# Patient Record
Sex: FEMALE | Race: WHITE | NOT HISPANIC OR LATINO | Employment: FULL TIME | URBAN - METROPOLITAN AREA
[De-identification: names, ages, dates, MRNs, and addresses within clinical notes are randomized per-mention and may not be internally consistent; named-entity substitution may affect disease eponyms.]

---

## 2021-12-03 ENCOUNTER — OFFICE VISIT (OUTPATIENT)
Dept: URGENT CARE | Facility: CLINIC | Age: 21
End: 2021-12-03
Payer: COMMERCIAL

## 2021-12-03 VITALS
BODY MASS INDEX: 17.03 KG/M2 | WEIGHT: 115 LBS | RESPIRATION RATE: 18 BRPM | HEIGHT: 69 IN | TEMPERATURE: 98 F | HEART RATE: 73 BPM | OXYGEN SATURATION: 99 % | DIASTOLIC BLOOD PRESSURE: 78 MMHG | SYSTOLIC BLOOD PRESSURE: 117 MMHG

## 2021-12-03 DIAGNOSIS — M25.562 LEFT KNEE PAIN, UNSPECIFIED CHRONICITY: Primary | ICD-10-CM

## 2021-12-03 PROCEDURE — 96372 THER/PROPH/DIAG INJ SC/IM: CPT | Mod: S$GLB,,, | Performed by: NURSE PRACTITIONER

## 2021-12-03 PROCEDURE — 99203 OFFICE O/P NEW LOW 30 MIN: CPT | Mod: 25,S$GLB,, | Performed by: NURSE PRACTITIONER

## 2021-12-03 PROCEDURE — 96372 PR INJECTION,THERAP/PROPH/DIAG2ST, IM OR SUBCUT: ICD-10-PCS | Mod: S$GLB,,, | Performed by: NURSE PRACTITIONER

## 2021-12-03 PROCEDURE — 99203 PR OFFICE/OUTPT VISIT, NEW, LEVL III, 30-44 MIN: ICD-10-PCS | Mod: 25,S$GLB,, | Performed by: NURSE PRACTITIONER

## 2021-12-03 RX ORDER — DEXAMETHASONE SODIUM PHOSPHATE 100 MG/10ML
10 INJECTION INTRAMUSCULAR; INTRAVENOUS ONCE
Status: COMPLETED | OUTPATIENT
Start: 2021-12-03 | End: 2021-12-03

## 2021-12-03 RX ORDER — TRAZODONE HYDROCHLORIDE 50 MG/1
50 TABLET ORAL NIGHTLY
COMMUNITY
End: 2022-08-11

## 2021-12-03 RX ORDER — DEXTROAMPHETAMINE SACCHARATE, AMPHETAMINE ASPARTATE MONOHYDRATE, DEXTROAMPHETAMINE SULFATE AND AMPHETAMINE SULFATE 5; 5; 5; 5 MG/1; MG/1; MG/1; MG/1
20 CAPSULE, EXTENDED RELEASE ORAL EVERY MORNING
COMMUNITY
End: 2022-07-12

## 2021-12-03 RX ADMIN — DEXAMETHASONE SODIUM PHOSPHATE 10 MG: 100 INJECTION INTRAMUSCULAR; INTRAVENOUS at 03:12

## 2022-05-17 ENCOUNTER — OFFICE VISIT (OUTPATIENT)
Dept: URGENT CARE | Facility: CLINIC | Age: 22
End: 2022-05-17
Payer: COMMERCIAL

## 2022-05-17 VITALS
HEIGHT: 69 IN | DIASTOLIC BLOOD PRESSURE: 88 MMHG | OXYGEN SATURATION: 98 % | WEIGHT: 115 LBS | HEART RATE: 95 BPM | TEMPERATURE: 99 F | SYSTOLIC BLOOD PRESSURE: 124 MMHG | BODY MASS INDEX: 17.03 KG/M2 | RESPIRATION RATE: 20 BRPM

## 2022-05-17 DIAGNOSIS — U07.1 COVID-19 VIRUS INFECTION: Primary | ICD-10-CM

## 2022-05-17 LAB
CTP QC/QA: YES
SARS-COV-2 RDRP RESP QL NAA+PROBE: POSITIVE

## 2022-05-17 PROCEDURE — U0002: ICD-10-PCS | Mod: QW,S$GLB,, | Performed by: STUDENT IN AN ORGANIZED HEALTH CARE EDUCATION/TRAINING PROGRAM

## 2022-05-17 PROCEDURE — 3008F BODY MASS INDEX DOCD: CPT | Mod: CPTII,S$GLB,, | Performed by: STUDENT IN AN ORGANIZED HEALTH CARE EDUCATION/TRAINING PROGRAM

## 2022-05-17 PROCEDURE — 99213 OFFICE O/P EST LOW 20 MIN: CPT | Mod: S$GLB,,, | Performed by: STUDENT IN AN ORGANIZED HEALTH CARE EDUCATION/TRAINING PROGRAM

## 2022-05-17 PROCEDURE — 3008F PR BODY MASS INDEX (BMI) DOCUMENTED: ICD-10-PCS | Mod: CPTII,S$GLB,, | Performed by: STUDENT IN AN ORGANIZED HEALTH CARE EDUCATION/TRAINING PROGRAM

## 2022-05-17 PROCEDURE — U0002 COVID-19 LAB TEST NON-CDC: HCPCS | Mod: QW,S$GLB,, | Performed by: STUDENT IN AN ORGANIZED HEALTH CARE EDUCATION/TRAINING PROGRAM

## 2022-05-17 PROCEDURE — 1159F MED LIST DOCD IN RCRD: CPT | Mod: CPTII,S$GLB,, | Performed by: STUDENT IN AN ORGANIZED HEALTH CARE EDUCATION/TRAINING PROGRAM

## 2022-05-17 PROCEDURE — 3079F PR MOST RECENT DIASTOLIC BLOOD PRESSURE 80-89 MM HG: ICD-10-PCS | Mod: CPTII,S$GLB,, | Performed by: STUDENT IN AN ORGANIZED HEALTH CARE EDUCATION/TRAINING PROGRAM

## 2022-05-17 PROCEDURE — 99213 PR OFFICE/OUTPT VISIT, EST, LEVL III, 20-29 MIN: ICD-10-PCS | Mod: S$GLB,,, | Performed by: STUDENT IN AN ORGANIZED HEALTH CARE EDUCATION/TRAINING PROGRAM

## 2022-05-17 PROCEDURE — 1160F RVW MEDS BY RX/DR IN RCRD: CPT | Mod: CPTII,S$GLB,, | Performed by: STUDENT IN AN ORGANIZED HEALTH CARE EDUCATION/TRAINING PROGRAM

## 2022-05-17 PROCEDURE — 3079F DIAST BP 80-89 MM HG: CPT | Mod: CPTII,S$GLB,, | Performed by: STUDENT IN AN ORGANIZED HEALTH CARE EDUCATION/TRAINING PROGRAM

## 2022-05-17 PROCEDURE — 3074F SYST BP LT 130 MM HG: CPT | Mod: CPTII,S$GLB,, | Performed by: STUDENT IN AN ORGANIZED HEALTH CARE EDUCATION/TRAINING PROGRAM

## 2022-05-17 PROCEDURE — 1159F PR MEDICATION LIST DOCUMENTED IN MEDICAL RECORD: ICD-10-PCS | Mod: CPTII,S$GLB,, | Performed by: STUDENT IN AN ORGANIZED HEALTH CARE EDUCATION/TRAINING PROGRAM

## 2022-05-17 PROCEDURE — 3074F PR MOST RECENT SYSTOLIC BLOOD PRESSURE < 130 MM HG: ICD-10-PCS | Mod: CPTII,S$GLB,, | Performed by: STUDENT IN AN ORGANIZED HEALTH CARE EDUCATION/TRAINING PROGRAM

## 2022-05-17 PROCEDURE — 1160F PR REVIEW ALL MEDS BY PRESCRIBER/CLIN PHARMACIST DOCUMENTED: ICD-10-PCS | Mod: CPTII,S$GLB,, | Performed by: STUDENT IN AN ORGANIZED HEALTH CARE EDUCATION/TRAINING PROGRAM

## 2022-05-17 RX ORDER — MELOXICAM 15 MG/1
15 TABLET ORAL DAILY
COMMUNITY
Start: 2021-12-07 | End: 2022-08-11

## 2022-05-17 NOTE — LETTER
[address] [facility]  [place] [address]  Phone: [phone]  Fax: [phone]          Return to Work/School    Patient: Sunshine Lund  YOB: 2000   Date: 05/17/2022     To Whom It May Concern:     Sunshine Lund was in contact with/seen in my office on 05/17/2022. COVID-19 is present in our communities across the [facility].      Sunshine Lund has met the criteria for COVID-19 testing and has a POSITIVE result. She can return to work once they are experiencing symptom improvement and afebrile  for 24 hours without the use of fever reducing medications AND at least five days from the start of symptoms (or from the first positive result if they have no symptoms).      If you have any questions or concerns, or if I can be of further assistance, please do not hesitate to contact me.     Sincerely,      Sheila Knutson MD

## 2022-05-17 NOTE — PROGRESS NOTES
"Subjective:       Patient ID: Sunshine Lund is a 21 y.o. female.    Vitals:  height is 5' 9" (1.753 m) and weight is 52.2 kg (115 lb). Her temperature is 98.8 °F (37.1 °C). Her blood pressure is 124/88 and her pulse is 95. Her respiration is 20 and oxygen saturation is 98%.     Chief Complaint: Nasal Congestion (Positive at-home test - Entered by patient)    This is a 21 y.o. female   who presents today with a chief complaint of cold symptoms that began two days ago. She's complaining of congestion, cough, vomiting and fatigue. She's been taking ibuprofen and mucinex to help relieve her symptoms.     URI   This is a new problem. The current episode started in the past 7 days. The problem has been gradually worsening. There has been no fever. Associated symptoms include congestion, coughing and vomiting. Pertinent negatives include no abdominal pain, chest pain, diarrhea, dysuria, ear pain, headaches, joint pain, joint swelling, nausea, neck pain, plugged ear sensation, rash, rhinorrhea, sinus pain, sneezing, sore throat, swollen glands or wheezing. Treatments tried: mucinex and ibuprofen. The treatment provided mild relief.       Constitution: Positive for fatigue.   HENT: Positive for congestion. Negative for ear pain, sinus pain and sore throat.    Neck: Negative for neck pain.   Cardiovascular: Negative for chest pain.   Respiratory: Positive for cough and sputum production. Negative for wheezing.    Gastrointestinal: Positive for vomiting. Negative for abdominal pain, nausea and diarrhea.   Genitourinary: Negative for dysuria.   Skin: Negative for rash.   Allergic/Immunologic: Negative for sneezing.   Neurological: Negative for headaches.       Objective:      Physical Exam   Constitutional: She is oriented to person, place, and time. She appears ill. No distress.   HENT:   Head: Normocephalic.   Pulmonary/Chest: No respiratory distress.   Neurological: She is alert and oriented to person, place, and time. " Coordination normal.   Psychiatric: Her behavior is normal. Mood normal.   Nursing note and vitals reviewed.        Assessment:       1. COVID-19 virus infection        Results for orders placed or performed in visit on 05/17/22   POCT COVID-19 Rapid Screening   Result Value Ref Range    POC Rapid COVID Positive (A) Negative     Acceptable Yes        Plan:         COVID-19 virus infection  -     POCT COVID-19 Rapid Screening    Exam denotes features of patient observation only.   Vitals stable. No evidence of respiratory distress noted, able to speak in complete sentences without pause.    Requesting COVID-19 testing post exposure and/or given symptoms.   Tested for COVID-19 today. Rapid test was Positive. Educated on COVID-19 and COVID-19 testing. Advised on COVID-19 precautions. COVID Risk Score: 0 .     Discussed supportive care and OTC meds for symptom relief. Advised on return/follow-up precautions. Advised on ER precautions. Answered all patient questions. Patient verbalized understanding and voiced agreement with current treatment plan.

## 2022-05-17 NOTE — PATIENT INSTRUCTIONS
Below are suggestions for symptomatic relief of your upper respiratory symptoms:              -Salt water gargles to soothe throat pain.              -Chloroseptic spray and Cepacol lozenges also help to numb throat pain.              -Warm herbal teas with honey/lemon/delia can help soothe sore throat and hoarseness              -Nasal saline spray reduces inflammation and dryness.              -Warm face compresses to help with facial sinus pain/pressure.              -Humidifiers and steam can help with nasal dryness and congestion              -Vicks vapor rub at night.              -Flonase OTC or Nasacort OTC for nasal congestion and post-nasal drip. Ok to use twice daily for the first week, then reduce to once daily after symptoms have begun to improve.              -Afrin is a nasal spray that can give immediate relief of nasal congestion but you cannot use this medication for more than 3 days              -Simple foods like chicken noodle soup.              -Mucinex for congestion or Mucinex DM for cough during the day time. Delsym helps with coughing at night. Mucinex-D if you have chest congestion or sputum (caution if history of high blood pressure or palpitations). You must increase your water intake when using expectorants (Mucinex).              -Zyrtec/Claritin/Allegra/Xyzal should help with allergies.  -If you DO NOT have Hypertension or any history of palpitations, it is ok to take over the counter Sudafed or Mucinex D or Allegra-D or Claritin-D or Zyrtec-D.  -If you do take one of the above, it is ok to combine that with plain over the counter Mucinex or Allegra or Claritin or Zyrtec. If, for example, you are taking Zyrtec -D, you can combine that with Mucinex, but not Mucinex-D.  If you are taking Mucinex-D, you can combine that with plain Allegra or Claritin or Zyrtec.   -If you DO have Hypertension or palpitations, it is safe to take Coricidin HBP for relief of sinus symptoms.    You have  tested positive for COVID-19 today.  Please note that patients who test positive for COVID-19 are required by the CDC to undergo isolation for 5 days after their symptoms first began.     This isolation starts from the day you first developed symptoms, not the day of your positive test. For example, if your symptoms began on a Monday but tested positive on the following Wednesday, your 10-day isolation begins from that Monday, not the Wednesday you tested positive.    However, if you are asymptomatic (a person who does not have any symptoms) and COVID-19 positive, your -day isolation begins on the day you tested positive, regardless of exposure date.    You must to wear a mask for an additional 5 days after resuming public activities once your 5 day quarantine ends.    Also, per the CDC guidelines, once your 5 days have passed, and you have not had fever greater than 100.4F in the last 24 hours without taking any fever reducers such as Tylenol (Acetaminophen) or Motrin (Ibuprofen) AND you have 24h of symptom improvement, you may return to your normal activities including social distancing, wearing masks, and frequent handwashing - YOU DO NOT NEED ANOTHER TEST IN ORDER TO END YOUR QUARANTINE.      CDC Testing and Quarantine Guidelines for patients with exposure to a known-positive COVID-19 person (within 6 feet for more than 15 minutes over a 24hour period):    You should quarantine post exposure to someone who has COVID 19 if you meet any of the following criteria:   - Symptomatic  - Vaccinated but have not yet received a booster if you are qualified to do so  - Vaccinated but it has not been more than 2 weeks since your last dose.  - Vaccinated but have only received one dose in a 2 dose series.     Test 5 days after exposure or at first sign of symptoms. If negative, continue to mask and monitor for symptoms for 10 days post exposure.     If positive, see above for positive COVID test qurantine guidelines.

## 2022-06-21 ENCOUNTER — OFFICE VISIT (OUTPATIENT)
Dept: INTERNAL MEDICINE | Facility: CLINIC | Age: 22
End: 2022-06-21
Payer: COMMERCIAL

## 2022-06-21 VITALS
HEIGHT: 69 IN | WEIGHT: 112.44 LBS | SYSTOLIC BLOOD PRESSURE: 110 MMHG | DIASTOLIC BLOOD PRESSURE: 64 MMHG | HEART RATE: 83 BPM | OXYGEN SATURATION: 98 % | BODY MASS INDEX: 16.65 KG/M2

## 2022-06-21 DIAGNOSIS — F31.81 BIPOLAR 2 DISORDER: ICD-10-CM

## 2022-06-21 PROCEDURE — 99202 OFFICE O/P NEW SF 15 MIN: CPT | Mod: S$GLB,,, | Performed by: INTERNAL MEDICINE

## 2022-06-21 PROCEDURE — 1159F PR MEDICATION LIST DOCUMENTED IN MEDICAL RECORD: ICD-10-PCS | Mod: CPTII,S$GLB,, | Performed by: INTERNAL MEDICINE

## 2022-06-21 PROCEDURE — 1160F PR REVIEW ALL MEDS BY PRESCRIBER/CLIN PHARMACIST DOCUMENTED: ICD-10-PCS | Mod: CPTII,S$GLB,, | Performed by: INTERNAL MEDICINE

## 2022-06-21 PROCEDURE — 99999 PR PBB SHADOW E&M-EST. PATIENT-LVL IV: CPT | Mod: PBBFAC,,, | Performed by: INTERNAL MEDICINE

## 2022-06-21 PROCEDURE — 3008F BODY MASS INDEX DOCD: CPT | Mod: CPTII,S$GLB,, | Performed by: INTERNAL MEDICINE

## 2022-06-21 PROCEDURE — 3074F SYST BP LT 130 MM HG: CPT | Mod: CPTII,S$GLB,, | Performed by: INTERNAL MEDICINE

## 2022-06-21 PROCEDURE — 99999 PR PBB SHADOW E&M-EST. PATIENT-LVL IV: ICD-10-PCS | Mod: PBBFAC,,, | Performed by: INTERNAL MEDICINE

## 2022-06-21 PROCEDURE — 3078F DIAST BP <80 MM HG: CPT | Mod: CPTII,S$GLB,, | Performed by: INTERNAL MEDICINE

## 2022-06-21 PROCEDURE — 1159F MED LIST DOCD IN RCRD: CPT | Mod: CPTII,S$GLB,, | Performed by: INTERNAL MEDICINE

## 2022-06-21 PROCEDURE — 3008F PR BODY MASS INDEX (BMI) DOCUMENTED: ICD-10-PCS | Mod: CPTII,S$GLB,, | Performed by: INTERNAL MEDICINE

## 2022-06-21 PROCEDURE — 3074F PR MOST RECENT SYSTOLIC BLOOD PRESSURE < 130 MM HG: ICD-10-PCS | Mod: CPTII,S$GLB,, | Performed by: INTERNAL MEDICINE

## 2022-06-21 PROCEDURE — 3078F PR MOST RECENT DIASTOLIC BLOOD PRESSURE < 80 MM HG: ICD-10-PCS | Mod: CPTII,S$GLB,, | Performed by: INTERNAL MEDICINE

## 2022-06-21 PROCEDURE — 1160F RVW MEDS BY RX/DR IN RCRD: CPT | Mod: CPTII,S$GLB,, | Performed by: INTERNAL MEDICINE

## 2022-06-21 PROCEDURE — 99202 PR OFFICE/OUTPT VISIT, NEW, LEVL II, 15-29 MIN: ICD-10-PCS | Mod: S$GLB,,, | Performed by: INTERNAL MEDICINE

## 2022-06-21 RX ORDER — ZIPRASIDONE HYDROCHLORIDE 20 MG/1
20 CAPSULE ORAL DAILY
Qty: 30 CAPSULE | Refills: 0 | Status: SHIPPED | OUTPATIENT
Start: 2022-06-21 | End: 2022-07-26 | Stop reason: SDUPTHER

## 2022-06-21 NOTE — PROGRESS NOTES
Subjective:       Patient ID: Sunshine Lund is a 21 y.o. female.    Chief Complaint: Manic Behavior (Meds refilled)      Pt and problem are new to me.    Sunshine Lund is 21 y.o. female who presents for f/u for bipolar.  Pt was on Geodin 20 mg po daily for bipolar 2 but stopped medication 3 months ago because she moved here from [place].  Pt was followed by psychiatry in [place] and prescribed Geodon X 6 months.        Review of Systems   Constitutional: Negative for chills and fever.   Respiratory: Negative for shortness of breath.    Cardiovascular: Negative for chest pain.   Gastrointestinal: Negative for abdominal pain, constipation, diarrhea, nausea and vomiting.   Psychiatric/Behavioral: Positive for dysphoric mood. Negative for suicidal ideas. The patient is nervous/anxious.          Objective:      Physical Exam  Vitals reviewed.   Constitutional:       General: She is awake.      Appearance: Normal appearance.   HENT:      Head: Normocephalic and atraumatic.      Mouth/Throat:      Mouth: Mucous membranes are moist.      Pharynx: Oropharynx is clear.   Eyes:      Extraocular Movements: Extraocular movements intact.      Conjunctiva/sclera: Conjunctivae normal.      Pupils: Pupils are equal, round, and reactive to light.   Cardiovascular:      Rate and Rhythm: Normal rate and regular rhythm.      Heart sounds: No murmur heard.    No friction rub. No gallop.   Pulmonary:      Effort: Pulmonary effort is normal.      Breath sounds: Normal breath sounds.   Abdominal:      General: Bowel sounds are normal. There is no distension.      Tenderness: There is no abdominal tenderness. There is no guarding or rebound.   Musculoskeletal:      Right lower leg: No edema.      Left lower leg: No edema.   Lymphadenopathy:      Cervical: No cervical adenopathy.   Neurological:      Mental Status: She is alert and oriented to person, place, and time.   Psychiatric:         Mood and Affect: Mood normal.         Behavior:  Behavior is cooperative.         Assessment:       1. Bipolar 2 disorder        Plan:     Pt with Bipolar 2 disorder diagnosed by a psychiatrist in [place], [place] who moved to [place] to live with her mother and ran out of her medication.  Pt would like to restart treatment.  Pt denies suicidal/homicidal ideation. Will check baseline labs CBC, CMP.  Will restart Geodon 20 mg po daily.  Will set up PCP apt.  Will refer to psychiatry for medication management. Patient was advised to follow up if symptom are not improving or worsened.        Sunshine was seen today for manic behavior.    Diagnoses and all orders for this visit:    Bipolar 2 disorder  -     CBC Auto Differential; Future  -     Comprehensive Metabolic Panel; Future  -     Ambulatory referral/consult to Psychiatry; Future    Other orders  -     ziprasidone (GEODON) 20 MG Cap; Take 1 capsule (20 mg total) by mouth Daily.

## 2022-07-12 ENCOUNTER — OFFICE VISIT (OUTPATIENT)
Dept: PSYCHIATRY | Facility: CLINIC | Age: 22
End: 2022-07-12
Payer: COMMERCIAL

## 2022-07-12 VITALS
OXYGEN SATURATION: 98 % | BODY MASS INDEX: 16.76 KG/M2 | HEART RATE: 84 BPM | DIASTOLIC BLOOD PRESSURE: 76 MMHG | HEIGHT: 69 IN | WEIGHT: 113.19 LBS | SYSTOLIC BLOOD PRESSURE: 111 MMHG

## 2022-07-12 DIAGNOSIS — F41.1 GENERALIZED ANXIETY DISORDER WITH PANIC ATTACKS: Primary | ICD-10-CM

## 2022-07-12 DIAGNOSIS — F31.81 BIPOLAR 2 DISORDER: ICD-10-CM

## 2022-07-12 DIAGNOSIS — F41.0 GENERALIZED ANXIETY DISORDER WITH PANIC ATTACKS: Primary | ICD-10-CM

## 2022-07-12 PROCEDURE — 99205 PR OFFICE/OUTPT VISIT, NEW, LEVL V, 60-74 MIN: ICD-10-PCS | Mod: S$GLB,,, | Performed by: PHYSICIAN ASSISTANT

## 2022-07-12 PROCEDURE — 3008F BODY MASS INDEX DOCD: CPT | Mod: CPTII,S$GLB,, | Performed by: PHYSICIAN ASSISTANT

## 2022-07-12 PROCEDURE — 1160F RVW MEDS BY RX/DR IN RCRD: CPT | Mod: CPTII,S$GLB,, | Performed by: PHYSICIAN ASSISTANT

## 2022-07-12 PROCEDURE — 1160F PR REVIEW ALL MEDS BY PRESCRIBER/CLIN PHARMACIST DOCUMENTED: ICD-10-PCS | Mod: CPTII,S$GLB,, | Performed by: PHYSICIAN ASSISTANT

## 2022-07-12 PROCEDURE — 1159F PR MEDICATION LIST DOCUMENTED IN MEDICAL RECORD: ICD-10-PCS | Mod: CPTII,S$GLB,, | Performed by: PHYSICIAN ASSISTANT

## 2022-07-12 PROCEDURE — 3008F PR BODY MASS INDEX (BMI) DOCUMENTED: ICD-10-PCS | Mod: CPTII,S$GLB,, | Performed by: PHYSICIAN ASSISTANT

## 2022-07-12 PROCEDURE — 99999 PR PBB SHADOW E&M-EST. PATIENT-LVL III: ICD-10-PCS | Mod: PBBFAC,,, | Performed by: PHYSICIAN ASSISTANT

## 2022-07-12 PROCEDURE — 3074F SYST BP LT 130 MM HG: CPT | Mod: CPTII,S$GLB,, | Performed by: PHYSICIAN ASSISTANT

## 2022-07-12 PROCEDURE — 3078F PR MOST RECENT DIASTOLIC BLOOD PRESSURE < 80 MM HG: ICD-10-PCS | Mod: CPTII,S$GLB,, | Performed by: PHYSICIAN ASSISTANT

## 2022-07-12 PROCEDURE — 3078F DIAST BP <80 MM HG: CPT | Mod: CPTII,S$GLB,, | Performed by: PHYSICIAN ASSISTANT

## 2022-07-12 PROCEDURE — 1159F MED LIST DOCD IN RCRD: CPT | Mod: CPTII,S$GLB,, | Performed by: PHYSICIAN ASSISTANT

## 2022-07-12 PROCEDURE — 99205 OFFICE O/P NEW HI 60 MIN: CPT | Mod: S$GLB,,, | Performed by: PHYSICIAN ASSISTANT

## 2022-07-12 PROCEDURE — 3074F PR MOST RECENT SYSTOLIC BLOOD PRESSURE < 130 MM HG: ICD-10-PCS | Mod: CPTII,S$GLB,, | Performed by: PHYSICIAN ASSISTANT

## 2022-07-12 PROCEDURE — 99999 PR PBB SHADOW E&M-EST. PATIENT-LVL III: CPT | Mod: PBBFAC,,, | Performed by: PHYSICIAN ASSISTANT

## 2022-07-12 RX ORDER — BUSPIRONE HYDROCHLORIDE 10 MG/1
10 TABLET ORAL 3 TIMES DAILY
Qty: 90 TABLET | Refills: 1 | Status: SHIPPED | OUTPATIENT
Start: 2022-07-12 | End: 2022-08-11 | Stop reason: SDUPTHER

## 2022-07-12 NOTE — PROGRESS NOTES
Outpatient Psychiatry Initial Visit (HUI)    7/12/2022    Sunshine Lund, a 21 y.o. female, presenting for initial evaluation visit. Met with patient.    Reason for Encounter: self-referral. Patient complains of No chief complaint on file.  .    History of Present Illness: Pt presents today for continuation of treatment of bipolar II disorder.  She moved here from [place] 7 months ago, had 3 months of her medications and ran out, then was restarted by PCP about a month ago.  She has most recently been taking ziprasidone 20 mg BID and trazodone 100 mg qHS PRN. Previous psychiatrist did a genetic test to see what meds were most compatible.  States she does not feel great on this combination.  Has also tried and failed Lexapro, Sertraline, Wellbutrin, and Vraylar.  Describes typical hypomanic episodes that last up to 5 days with increased energy, decreased sleep, increased motivation, elevated mood, increased talkativeness and social activity, increased spending.  Denies any extreme risk taking activity.  Depressive episodes have been more prominent with sad mood, passive SI (no attempts and no current SI), fatigue during the day and difficulty sleeping at night, decreased concentration and motivation, decreased energy, fluctuating appetite.  Her current symptoms are in the depressed range.  Especially dealing with decreased mental capacity and memory problems.    Most bothersome today is her anxiety.  She reports constant worrying about anything and everything.  Has palpitations and flushing as well.  Reports panic attacks a few times per week.  Has trouble relaxing, cannot control worrying, feels constantly keyed up.  The anxiety has been longstanding for years.      Uses marijuana nightly for sleep and anxiety.  Is usually able to fall asleep with this.  Agrees that ideally she would be managed on dependable pharmaceuticals as opposed to unpredictable illicitly acquired drugs.  Denies any other substance use.   "Denies psychosis, denies current SI/HI.  She is very thin but states she does not have body image distortions and would like to gain some weight but isn't too concerned about it.    Had to drop out of college due to anxiety and bipolar disorder, looking at going back but unsure of what she would like to study.  Currently working at [employer] and has a variable schedule.  Lives with her mother.  Dating a neo, relationship is going well.  No major life stressors currently.  Reports some depression and anxiety in her family.  No other bipolar nor schizophrenia in family history.    Review Of Systems:     GENERAL:  No weight gain or loss  SKIN:  No rashes or lacerations  HEAD:  No headaches  CHEST:  No shortness of breath, hyperventilation or cough  CARDIOVASCULAR:  No tachycardia or chest pain  ABDOMEN:  No nausea, vomiting, pain, constipation or diarrhea  MUSCULOSKELETAL:  No pain or stiffness of the joints  NEUROLOGIC:  No weakness, sensory changes, seizures, confusion, memory loss, tremor or other abnormal movements      Current Evaluation:     Nutritional Screening: Considering the patient's height and weight, medications, medical history and preferences, should a referral be made to the dietitian? no    Constitutional  Vitals:  Most recent vital signs, dated less than 90 days prior to this appointment, were reviewed.    Vitals:    07/12/22 1103   BP: 111/76   Pulse: 84   SpO2: 98%   Weight: 51.4 kg (113 lb 3.3 oz)   Height: 5' 9" (1.753 m)        General:  thin & gaunt looking     Musculoskeletal  Muscle Strength/Tone:  no tremor, no tic   Gait & Station:  non-ataxic     Psychiatric  Speech:  no latency; no press, soft   Mood & Affect:  anxious, depressed  congruent and appropriate, sad, anxious   Thought Process:  normal and logical   Associations:  intact   Thought Content:  normal, no suicidality, no homicidality, delusions, or paranoia   Insight:  has awareness of illness   Judgement: behavior is adequate to " circumstances   Orientation:  grossly intact   Memory: somewhat foggy memory of past treatments and events   Language: grossly intact   Attention Span & Concentration:  able to focus   Fund of Knowledge:  intact and appropriate to age and level of education       Relevant Elements of Neurological Exam: normal gait    Functioning in Relationships:  Spouse/partner: Good  Peers: Good  Employers: Good    Laboratory Data  No visits with results within 1 Month(s) from this visit.   Latest known visit with results is:   Office Visit on 05/17/2022   Component Date Value Ref Range Status    POC Rapid COVID 05/17/2022 Positive (A) Negative Final     Acceptable 05/17/2022 Yes   Final         Medications  Outpatient Encounter Medications as of 7/12/2022   Medication Sig Dispense Refill    busPIRone (BUSPAR) 10 MG tablet Take 1 tablet (10 mg total) by mouth 3 (three) times daily. 90 tablet 1    meloxicam (MOBIC) 15 MG tablet Take 15 mg by mouth once daily.      traZODone (DESYREL) 50 MG tablet Take 50 mg by mouth every evening.      ziprasidone (GEODON) 20 MG Cap Take 1 capsule (20 mg total) by mouth Daily. 30 capsule 0    [DISCONTINUED] dextroamphetamine-amphetamine (ADDERALL XR) 20 MG 24 hr capsule Take 20 mg by mouth every morning.       No facility-administered encounter medications on file as of 7/12/2022.           Assessment - Diagnosis - Goals:     Impression: Bipolar II disorder with AMANDA and panic attacks.  Currently mood is somewhat depressed but stable.  Larger issue today is anxiety.  Will start buspirone 5-10 mg TID.  Plan to switch mood stabilizers when anxiety is controlled, possibly Abilify or lamictal.      ICD-10-CM ICD-9-CM   1. Generalized anxiety disorder with panic attacks  F41.1 300.02    F41.0 300.01   2. Bipolar 2 disorder  F31.81 296.89     1. Start buspirone 5-10 mg TID.  Risks/bebefits/side effects discussed.  2. Continue ziprasidone 20 mg BID for now.  3. F/u 1m    Strengths and  Liabilities: Strength: Patient is intelligent.    Treatment Plan/Recommendations:   · Medication Management: The risks and benefits of medication were discussed with the patient.      Return to Clinic: 1 month      Total time: 50 min  Consulting clinician was informed of the encounter and consult note.

## 2022-07-26 ENCOUNTER — PATIENT MESSAGE (OUTPATIENT)
Dept: PSYCHIATRY | Facility: CLINIC | Age: 22
End: 2022-07-26
Payer: COMMERCIAL

## 2022-07-26 RX ORDER — ZIPRASIDONE HYDROCHLORIDE 20 MG/1
20 CAPSULE ORAL DAILY
Qty: 30 CAPSULE | Refills: 0 | Status: SHIPPED | OUTPATIENT
Start: 2022-07-26 | End: 2022-08-11 | Stop reason: SDUPTHER

## 2022-08-11 ENCOUNTER — OFFICE VISIT (OUTPATIENT)
Dept: PSYCHIATRY | Facility: CLINIC | Age: 22
End: 2022-08-11
Payer: COMMERCIAL

## 2022-08-11 DIAGNOSIS — F31.81 BIPOLAR 2 DISORDER: Primary | ICD-10-CM

## 2022-08-11 DIAGNOSIS — F41.1 GENERALIZED ANXIETY DISORDER WITH PANIC ATTACKS: ICD-10-CM

## 2022-08-11 DIAGNOSIS — F41.0 GENERALIZED ANXIETY DISORDER WITH PANIC ATTACKS: ICD-10-CM

## 2022-08-11 PROCEDURE — 1160F RVW MEDS BY RX/DR IN RCRD: CPT | Mod: CPTII,95,, | Performed by: PHYSICIAN ASSISTANT

## 2022-08-11 PROCEDURE — 1159F MED LIST DOCD IN RCRD: CPT | Mod: CPTII,95,, | Performed by: PHYSICIAN ASSISTANT

## 2022-08-11 PROCEDURE — 99214 PR OFFICE/OUTPT VISIT, EST, LEVL IV, 30-39 MIN: ICD-10-PCS | Mod: 95,,, | Performed by: PHYSICIAN ASSISTANT

## 2022-08-11 PROCEDURE — 1160F PR REVIEW ALL MEDS BY PRESCRIBER/CLIN PHARMACIST DOCUMENTED: ICD-10-PCS | Mod: CPTII,95,, | Performed by: PHYSICIAN ASSISTANT

## 2022-08-11 PROCEDURE — 1159F PR MEDICATION LIST DOCUMENTED IN MEDICAL RECORD: ICD-10-PCS | Mod: CPTII,95,, | Performed by: PHYSICIAN ASSISTANT

## 2022-08-11 PROCEDURE — 99214 OFFICE O/P EST MOD 30 MIN: CPT | Mod: 95,,, | Performed by: PHYSICIAN ASSISTANT

## 2022-08-11 RX ORDER — BUSPIRONE HYDROCHLORIDE 10 MG/1
10 TABLET ORAL 3 TIMES DAILY
Qty: 90 TABLET | Refills: 1 | Status: SHIPPED | OUTPATIENT
Start: 2022-08-11 | End: 2022-10-12 | Stop reason: SDUPTHER

## 2022-08-11 RX ORDER — ZIPRASIDONE HYDROCHLORIDE 20 MG/1
20 CAPSULE ORAL DAILY
Qty: 90 CAPSULE | Refills: 0 | Status: SHIPPED | OUTPATIENT
Start: 2022-08-11 | End: 2022-09-12 | Stop reason: SDUPTHER

## 2022-08-11 RX ORDER — AMITRIPTYLINE HYDROCHLORIDE 10 MG/1
10-20 TABLET, FILM COATED ORAL NIGHTLY PRN
Qty: 90 TABLET | Refills: 0 | Status: SHIPPED | OUTPATIENT
Start: 2022-08-11 | End: 2022-09-12 | Stop reason: SDUPTHER

## 2022-08-11 NOTE — PROGRESS NOTES
Outpatient Psychiatry Follow-Up Visit (PA-C)  Patient agrees to being observed by student via secure HIPAA compliant [employer] account.    8/11/2022    Clinical Status of Patient:  Outpatient (Ambulatory)    The patient location is: Home in [place]  The chief complaint leading to consultation is: mood disorder    Visit type: audiovisual    Face to Face time with patient: 11  15 minutes of total time spent on the encounter, which includes face to face time and non-face to face time preparing to see the patient (eg, review of tests), Obtaining and/or reviewing separately obtained history, Documenting clinical information in the electronic or other health record, Independently interpreting results (not separately reported) and communicating results to the patient/family/caregiver, or Care coordination (not separately reported).     Each patient to whom he or she provides medical services by telemedicine is:  (1) informed of the relationship between the physician and patient and the respective role of any other health care provider with respect to management of the patient; and (2) notified that he or she may decline to receive medical services by telemedicine and may withdraw from such care at any time.        Chief Complaint:  Sunshine Lund is a 22 y.o. female who presents today for follow-up of mood disorder and anxiety.  Met with patient.      Interval History and Content of Current Session:  Interim Events/Subjective Report/Content of Current Session:   Patient returns for follow up of bipolar II and anxiety. She is currently taking Buspirone 10 mg TID and Ziprasidone 20 mg daily. She says her mood has been good. She feels the buspirone helps with her anxiety pretty immediately and lasts for a few hours; she reports having difficulty remembering to take it 3 times a day. She denies any side effects. She's been cleaning her room and working on her to-do list which she feels is helping her feel happier. She's  been looking for a new job (currently works at [employer]) but hasn't found anything yet. Says her relationship with her boyfriend is going well, and her mom is 17-days sober now.     She is trying to quit smoking cannabis but is requesting help with sleep. She goes to bed when she feels tired, but is unable to fall asleep. She normally smokes cannabis to help her fall asleep.  She says Trazadone makes her nauseous when she doesn't take it. Mirtazapine does not make her sleepy. And Ziprasidone does not feel sedating either. She has not tried melatonin.        Review of Systems   · PSYCHIATRIC: Pertinant items are noted in the narrative.  · CONSTITUTIONAL: No weight gain or loss.   · MUSCULOSKELETAL: No pain or stiffness of the joints.  · NEUROLOGIC: No weakness, sensory changes, seizures, confusion, memory loss, tremor or other abnormal movements.    Past Medical, Family and Social History: The patient's past medical, family and social history have been reviewed and updated as appropriate within the electronic medical record - see encounter notes.    Compliance: yes    Side effects: None    Risk Parameters:  Patient reports no suicidal ideation  Patient reports no homicidal ideation  Patient reports no self-injurious behavior  Patient reports no violent behavior    Exam (detailed: at least 9 elements; comprehensive: all 15 elements)   Constitutional  Vitals:  Most recent vital signs, dated less than 90 days prior to this appointment, were reviewed.   There were no vitals filed for this visit.     General:  unremarkable, age appropriate, casually dressed     Musculoskeletal  Muscle Strength/Tone:  not examined   Gait & Station:  not examined     Psychiatric  Speech:  no latency; no press   Mood & Affect:  steady  congruent and appropriate   Thought Process:  normal and logical   Associations:  intact   Thought Content:  normal, no suicidality, no homicidality, delusions, or paranoia   Insight:  intact   Judgement:  behavior is adequate to circumstances   Orientation:  grossly intact   Memory: intact for content of interview   Language: grossly intact   Attention Span & Concentration:  able to focus   Fund of Knowledge:  intact and appropriate to age and level of education     Assessment and Diagnosis   Status/Progress: Based on the examination today, the patient's problem(s) is/are adequately but not ideally controlled.  New problems have not been presented today.   Diagnostic uncertainty and Lack of compliance are not complicating management of the primary condition.  There are no active rule-out diagnoses for this patient at this time.     General Impression: BP2 and AMANDA symptoms improved but not ideally controlled with Ziprasidone 20 mg and Buspirone 10 mg TID. Having difficulty remembering to take Buspar TID, recommended trying 15 mg BID. If she feels side effects of dizziness, light headedness, or HA, may have to return to 10 mg TID. Currently trying to quit smoking cannabis and is struggling to fall asleep as a result. Start Amitriptyline 10 mg daily PRN for sleep. Discussed risks/benefits/side effects which include but are not limited to dizziness, HA, GI upset, sedation, and weight gain. Patient was agreeable to this plan and had no other questions. Follow up in 1 month.       ICD-10-CM ICD-9-CM   1. Bipolar 2 disorder  F31.81 296.89   2. Generalized anxiety disorder with panic attacks  F41.1 300.02    F41.0 300.01     1. Start Amitriptyline 10 mg nightly PRN for sleep. Discussed risks/benefits/side effects which include but are not limited to dizziness, HA, GI upset, sedation, and weight gain.  2. Trial of Buspirone 15 mg BID. Instructed to return to 10 mg TID if she feels side effects of dizziness, agitation, or HA  3. Continue Ziprasidone 20 mg daily  4. Follow up in one month    Intervention/Counseling/Treatment Plan   · Medication Management: Continue current medications. The risks and benefits of medication were  discussed with the patient.      Return to Clinic: 1 month

## 2022-09-12 ENCOUNTER — OFFICE VISIT (OUTPATIENT)
Dept: PSYCHIATRY | Facility: CLINIC | Age: 22
End: 2022-09-12
Payer: COMMERCIAL

## 2022-09-12 DIAGNOSIS — F31.81 BIPOLAR 2 DISORDER: Primary | ICD-10-CM

## 2022-09-12 DIAGNOSIS — F41.1 GENERALIZED ANXIETY DISORDER WITH PANIC ATTACKS: ICD-10-CM

## 2022-09-12 DIAGNOSIS — F41.0 GENERALIZED ANXIETY DISORDER WITH PANIC ATTACKS: ICD-10-CM

## 2022-09-12 PROCEDURE — 1160F RVW MEDS BY RX/DR IN RCRD: CPT | Mod: CPTII,95,, | Performed by: PHYSICIAN ASSISTANT

## 2022-09-12 PROCEDURE — 1160F PR REVIEW ALL MEDS BY PRESCRIBER/CLIN PHARMACIST DOCUMENTED: ICD-10-PCS | Mod: CPTII,95,, | Performed by: PHYSICIAN ASSISTANT

## 2022-09-12 PROCEDURE — 99214 OFFICE O/P EST MOD 30 MIN: CPT | Mod: 95,,, | Performed by: PHYSICIAN ASSISTANT

## 2022-09-12 PROCEDURE — 1159F MED LIST DOCD IN RCRD: CPT | Mod: CPTII,95,, | Performed by: PHYSICIAN ASSISTANT

## 2022-09-12 PROCEDURE — 1159F PR MEDICATION LIST DOCUMENTED IN MEDICAL RECORD: ICD-10-PCS | Mod: CPTII,95,, | Performed by: PHYSICIAN ASSISTANT

## 2022-09-12 PROCEDURE — 99214 PR OFFICE/OUTPT VISIT, EST, LEVL IV, 30-39 MIN: ICD-10-PCS | Mod: 95,,, | Performed by: PHYSICIAN ASSISTANT

## 2022-09-12 RX ORDER — ZIPRASIDONE HYDROCHLORIDE 20 MG/1
20 CAPSULE ORAL 2 TIMES DAILY
Qty: 60 CAPSULE | Refills: 2 | Status: SHIPPED | OUTPATIENT
Start: 2022-09-12 | End: 2022-10-12 | Stop reason: SDUPTHER

## 2022-09-12 RX ORDER — AMITRIPTYLINE HYDROCHLORIDE 25 MG/1
12.5 TABLET, FILM COATED ORAL NIGHTLY PRN
Qty: 90 TABLET | Refills: 0 | Status: SHIPPED | OUTPATIENT
Start: 2022-09-12 | End: 2022-11-15 | Stop reason: SDUPTHER

## 2022-09-12 NOTE — PROGRESS NOTES
Outpatient Psychiatry Follow-Up Visit (PA-C)  Patient agrees to being observed by student via secure HIPAA compliant [employer] account.    9/12/2022    Clinical Status of Patient:  Outpatient (Ambulatory)    The patient location is: Home in [place]  The chief complaint leading to consultation is: mood disorder    Visit type: audiovisual    Face to Face time with patient: 11  15 minutes of total time spent on the encounter, which includes face to face time and non-face to face time preparing to see the patient (eg, review of tests), Obtaining and/or reviewing separately obtained history, Documenting clinical information in the electronic or other health record, Independently interpreting results (not separately reported) and communicating results to the patient/family/caregiver, or Care coordination (not separately reported).     Each patient to whom he or she provides medical services by telemedicine is:  (1) informed of the relationship between the physician and patient and the respective role of any other health care provider with respect to management of the patient; and (2) notified that he or she may decline to receive medical services by telemedicine and may withdraw from such care at any time.        Chief Complaint:  Sunshine Lund is a 22 y.o. female who presents today for follow-up of mood disorder and anxiety.  Met with patient.      Interval History and Content of Current Session:  Interim Events/Subjective Report/Content of Current Session:   Patient returns for follow up of bipolar II and anxiety. She is currently taking Buspirone 15 mg BID and Ziprasidone 20 mg BID. Added amitriptyline 10-20 mg last visit.  States the amitriptyline helps a little at 10 mg but not enough, but she is too sedated at 20 mg.  Also still reporting some nervousness and mild hypomania and depressive cycling lasting a few days at a time.  Thinks she gained a little weight and then lost it again.  Reported weight today was  110 lbs, down from 113 two months ago.      Review of Systems   PSYCHIATRIC: Pertinant items are noted in the narrative.  CONSTITUTIONAL: No weight gain or loss.   MUSCULOSKELETAL: No pain or stiffness of the joints.  NEUROLOGIC: No weakness, sensory changes, seizures, confusion, memory loss, tremor or other abnormal movements.    Past Medical, Family and Social History: The patient's past medical, family and social history have been reviewed and updated as appropriate within the electronic medical record - see encounter notes.    Compliance: yes    Side effects: None    Risk Parameters:  Patient reports no suicidal ideation  Patient reports no homicidal ideation  Patient reports no self-injurious behavior  Patient reports no violent behavior    Exam (detailed: at least 9 elements; comprehensive: all 15 elements)   Constitutional  Vitals:  Most recent vital signs, dated less than 90 days prior to this appointment, were reviewed.   There were no vitals filed for this visit.     General:  unremarkable, age appropriate, casually dressed     Musculoskeletal  Muscle Strength/Tone:  not examined   Gait & Station:  not examined     Psychiatric  Speech:  no latency; no press   Mood & Affect:  steady  congruent and appropriate   Thought Process:  normal and logical   Associations:  intact   Thought Content:  normal, no suicidality, no homicidality, delusions, or paranoia   Insight:  intact   Judgement: behavior is adequate to circumstances   Orientation:  grossly intact   Memory: intact for content of interview   Language: grossly intact   Attention Span & Concentration:  able to focus   Fund of Knowledge:  intact and appropriate to age and level of education     Assessment and Diagnosis   Status/Progress: Based on the examination today, the patient's problem(s) is/are adequately but not ideally controlled.  New problems have not been presented today.   Diagnostic uncertainty and Lack of compliance are not complicating  management of the primary condition.  There are no active rule-out diagnoses for this patient at this time.     General Impression: BP2 and AMANDA symptoms improved but not ideally controlled with Ziprasidone 20 mg BID and Buspirone 15 mg BID, with amitriptyline 10 mg nightly. Will increase the ziprasidone to 40 mg AM and 20 mg PM.  Also increaisng amitriptyline to 12.5 mg- half of a 25 mg tablet.  Pt says the 10 mg tablets of amitriptyline are too small and she is not sure she could split them in half to take 15 mg.  Gave pt the option to go up a little on the buspirone as well if she finds that helpful, now that she is doing BID and not getting the occasional mid-day dose (was not able to remember it sometimes).      ICD-10-CM ICD-9-CM   1. Bipolar 2 disorder  F31.81 296.89   2. Generalized anxiety disorder with panic attacks  F41.1 300.02    F41.0 300.01       1. Increasing ziprasidone from 20 mg BID to 40 mg AM and 20 mg PM.  Risks/bebefits/side effects discussed.  2. Continue buspirone 15 mg BID, can increase to 20 if desired.  3. Increase amitriptyline to 12.5 mg nightly.  4. Follow up in one month    Intervention/Counseling/Treatment Plan   Medication Management: Continue current medications. The risks and benefits of medication were discussed with the patient.      Return to Clinic: 1 month

## 2022-09-14 ENCOUNTER — IMMUNIZATION (OUTPATIENT)
Dept: INTERNAL MEDICINE | Facility: CLINIC | Age: 22
End: 2022-09-14
Payer: COMMERCIAL

## 2022-09-14 ENCOUNTER — OFFICE VISIT (OUTPATIENT)
Dept: INTERNAL MEDICINE | Facility: CLINIC | Age: 22
End: 2022-09-14
Payer: COMMERCIAL

## 2022-09-14 VITALS
HEIGHT: 69 IN | WEIGHT: 112.88 LBS | DIASTOLIC BLOOD PRESSURE: 80 MMHG | SYSTOLIC BLOOD PRESSURE: 112 MMHG | OXYGEN SATURATION: 98 % | BODY MASS INDEX: 16.72 KG/M2 | HEART RATE: 123 BPM

## 2022-09-14 DIAGNOSIS — Z00.00 LABORATORY EXAMINATION ORDERED AS PART OF A ROUTINE GENERAL MEDICAL EXAMINATION: ICD-10-CM

## 2022-09-14 DIAGNOSIS — R53.82 CHRONIC FATIGUE: ICD-10-CM

## 2022-09-14 DIAGNOSIS — Z00.00 ENCOUNTER FOR ANNUAL PHYSICAL EXAM: Primary | ICD-10-CM

## 2022-09-14 DIAGNOSIS — Z01.419 WELL WOMAN EXAM: ICD-10-CM

## 2022-09-14 PROCEDURE — 3008F PR BODY MASS INDEX (BMI) DOCUMENTED: ICD-10-PCS | Mod: CPTII,S$GLB,, | Performed by: INTERNAL MEDICINE

## 2022-09-14 PROCEDURE — 99395 PR PREVENTIVE VISIT,EST,18-39: ICD-10-PCS | Mod: S$GLB,,, | Performed by: INTERNAL MEDICINE

## 2022-09-14 PROCEDURE — 99999 PR PBB SHADOW E&M-EST. PATIENT-LVL III: CPT | Mod: PBBFAC,,, | Performed by: INTERNAL MEDICINE

## 2022-09-14 PROCEDURE — 90471 FLU VACCINE (QUAD) GREATER THAN OR EQUAL TO 3YO PRESERVATIVE FREE IM: ICD-10-PCS | Mod: S$GLB,,, | Performed by: INTERNAL MEDICINE

## 2022-09-14 PROCEDURE — 99395 PREV VISIT EST AGE 18-39: CPT | Mod: S$GLB,,, | Performed by: INTERNAL MEDICINE

## 2022-09-14 PROCEDURE — 90471 IMMUNIZATION ADMIN: CPT | Mod: S$GLB,,, | Performed by: INTERNAL MEDICINE

## 2022-09-14 PROCEDURE — 1159F MED LIST DOCD IN RCRD: CPT | Mod: CPTII,S$GLB,, | Performed by: INTERNAL MEDICINE

## 2022-09-14 PROCEDURE — 90686 FLU VACCINE (QUAD) GREATER THAN OR EQUAL TO 3YO PRESERVATIVE FREE IM: ICD-10-PCS | Mod: S$GLB,,, | Performed by: INTERNAL MEDICINE

## 2022-09-14 PROCEDURE — 90686 IIV4 VACC NO PRSV 0.5 ML IM: CPT | Mod: S$GLB,,, | Performed by: INTERNAL MEDICINE

## 2022-09-14 PROCEDURE — 3079F PR MOST RECENT DIASTOLIC BLOOD PRESSURE 80-89 MM HG: ICD-10-PCS | Mod: CPTII,S$GLB,, | Performed by: INTERNAL MEDICINE

## 2022-09-14 PROCEDURE — 99999 PR PBB SHADOW E&M-EST. PATIENT-LVL III: ICD-10-PCS | Mod: PBBFAC,,, | Performed by: INTERNAL MEDICINE

## 2022-09-14 PROCEDURE — 1159F PR MEDICATION LIST DOCUMENTED IN MEDICAL RECORD: ICD-10-PCS | Mod: CPTII,S$GLB,, | Performed by: INTERNAL MEDICINE

## 2022-09-14 PROCEDURE — 3074F SYST BP LT 130 MM HG: CPT | Mod: CPTII,S$GLB,, | Performed by: INTERNAL MEDICINE

## 2022-09-14 PROCEDURE — 3074F PR MOST RECENT SYSTOLIC BLOOD PRESSURE < 130 MM HG: ICD-10-PCS | Mod: CPTII,S$GLB,, | Performed by: INTERNAL MEDICINE

## 2022-09-14 PROCEDURE — 3079F DIAST BP 80-89 MM HG: CPT | Mod: CPTII,S$GLB,, | Performed by: INTERNAL MEDICINE

## 2022-09-14 PROCEDURE — 3008F BODY MASS INDEX DOCD: CPT | Mod: CPTII,S$GLB,, | Performed by: INTERNAL MEDICINE

## 2022-09-14 RX ORDER — DOXYCYCLINE HYCLATE 50 MG/1
50 CAPSULE ORAL DAILY
COMMUNITY
Start: 2022-07-15 | End: 2022-11-15

## 2022-09-14 NOTE — PATIENT INSTRUCTIONS
Flu shot today  Schedule fasting labwork for tomorrow morning  Schedule gynecology referral    Return to clinic in 1 year or sooner if needed.

## 2022-09-14 NOTE — PROGRESS NOTES
Subjective:       Patient ID: Sunshine Lund is a 22 y.o. female.    Chief Complaint: Establish Care      HPI  Sunshine Lund is a 22 y.o. year old female with bipolar 2 d/o, insomnia, anxiety presents for establishment of care. Last PCP was pediatrician in [place], [place]. Last labwork 2016 (reviewed). Has not established gyn here yet. Just recently started new job ([employer]). Here today for annual exam and establishment of care.    Review of Systems   Constitutional:  Positive for fatigue (chronic, possibly from lack of eating). Negative for appetite change, chills and unexpected weight change.        THC use for appetite   HENT:  Positive for congestion (chronic sinus congestion) and postnasal drip. Negative for rhinorrhea, sinus pressure, sinus pain and sore throat.    Eyes:  Negative for pain and visual disturbance.   Respiratory:  Negative for cough, chest tightness, shortness of breath and wheezing.    Cardiovascular:  Positive for palpitations (with anxiety). Negative for chest pain and leg swelling.   Gastrointestinal:  Negative for abdominal pain, constipation, diarrhea, nausea and vomiting.   Genitourinary:  Negative for dysuria.   Musculoskeletal:  Negative for arthralgias, back pain, myalgias and neck pain.   Skin:  Negative for rash.   Neurological:  Negative for dizziness, weakness and light-headedness.   Psychiatric/Behavioral:  Negative for dysphoric mood. The patient is not nervous/anxious.         Controlled on medications; follows with psychiatry       No past medical history on file.     Prior to Admission medications    Medication Sig Start Date End Date Taking? Authorizing Provider   amitriptyline (ELAVIL) 25 MG tablet Take 0.5 tablets (12.5 mg total) by mouth nightly as needed for Insomnia. 9/12/22 12/11/22 Yes Lissette Belcher PA-C   busPIRone (BUSPAR) 10 MG tablet Take 1 tablet (10 mg total) by mouth 3 (three) times daily. 8/11/22 11/9/22 Yes Lissette Belcher PA-C  "  ziprasidone (GEODON) 20 MG Cap Take 1 capsule (20 mg total) by mouth 2 (two) times daily. 9/12/22 12/11/22 Yes [facility] Evans[facility]        Past medical history, surgical history, and family medical history reviewed and updated as appropriate.    Medications and allergies reviewed.     Objective:          Vitals:    09/14/22 1531   BP: 112/80   BP Location: Right arm   Patient Position: Sitting   BP Method: Medium (Manual)   Pulse: (!) 123   SpO2: 98%   Weight: 51.2 kg (112 lb 14 oz)   Height: 5' 9" (1.753 m)     Body mass index is 16.67 kg/m².  Physical Exam  Constitutional:       Appearance: She is well-developed.   HENT:      Head: Normocephalic and atraumatic.   Eyes:      Extraocular Movements: Extraocular movements intact.   Cardiovascular:      Rate and Rhythm: Normal rate and regular rhythm.      Heart sounds: Normal heart sounds.   Pulmonary:      Effort: Pulmonary effort is normal. No respiratory distress.      Breath sounds: Normal breath sounds. No wheezing.   Abdominal:      General: Bowel sounds are normal. There is no distension.      Palpations: Abdomen is soft.      Tenderness: There is no abdominal tenderness.   Musculoskeletal:         General: No tenderness. Normal range of motion.      Cervical back: Normal range of motion.   Skin:     General: Skin is warm and dry.   Neurological:      Mental Status: She is alert and oriented to person, place, and time.      Cranial Nerves: No cranial nerve deficit.      Deep Tendon Reflexes: Reflexes are normal and symmetric.       No results found for: WBC, HGB, HCT, PLT, CHOL, TRIG, HDL, LDLDIRECT, ALT, AST, NA, K, CL, CREATININE, BUN, CO2, TSH, PSA, INR, GLUF, HGBA1C, MICROALBUR    Assessment:       1. Encounter for annual physical exam    2. Well woman exam    3. Laboratory examination ordered as part of a routine general medical examination    4. Body mass index (BMI) 19.9 or less, adult    5. Chronic fatigue          Plan:     Sunshine was " seen today for establish care.    Diagnoses and all orders for this visit:    Encounter for annual physical exam    Well woman exam  -     Ambulatory referral/consult to Gynecology; Future    Laboratory examination ordered as part of a routine general medical examination  -     CBC Auto Differential; Future  -     Comprehensive Metabolic Panel; Future  -     TSH; Future  -     Lipid Panel; Future  -     HEPATITIS C ANTIBODY; Future  -     HIV 1/2 Ag/Ab (4th Gen); Future    Body mass index (BMI) 19.9 or less, adult    Chronic fatigue  -     Vitamin D; Future    Benign physical examination, no issues identified. Will obtain routine labwork and age appropriate health screenings.     Patient does take doxycyline 50 mg daily for acne. Taking birth control currently, v/u to stop tetracyclines if she does become pregnant.     Health maintenance reviewed with patient. Patient is due for well woman exam, flu shot; unsure about HPV vaccination status. Will get Tdap next year (states she got one in high school). Pt will try to locate immunization records.     Follow up in about 1 year (around 9/14/2023).    Brown Valdes MD  Internal Medicine / Primary Care  [facility]  9/14/2022

## 2022-09-15 ENCOUNTER — LAB VISIT (OUTPATIENT)
Dept: LAB | Facility: HOSPITAL | Age: 22
End: 2022-09-15
Attending: INTERNAL MEDICINE
Payer: COMMERCIAL

## 2022-09-15 DIAGNOSIS — R53.82 CHRONIC FATIGUE: ICD-10-CM

## 2022-09-15 DIAGNOSIS — Z00.00 LABORATORY EXAMINATION ORDERED AS PART OF A ROUTINE GENERAL MEDICAL EXAMINATION: ICD-10-CM

## 2022-09-15 LAB
25(OH)D3+25(OH)D2 SERPL-MCNC: 35 NG/ML (ref 30–96)
ALBUMIN SERPL BCP-MCNC: 4.1 G/DL (ref 3.5–5.2)
ALP SERPL-CCNC: 42 U/L (ref 55–135)
ALT SERPL W/O P-5'-P-CCNC: 8 U/L (ref 10–44)
ANION GAP SERPL CALC-SCNC: 8 MMOL/L (ref 8–16)
AST SERPL-CCNC: 12 U/L (ref 10–40)
BASOPHILS # BLD AUTO: 0.02 K/UL (ref 0–0.2)
BASOPHILS NFR BLD: 0.4 % (ref 0–1.9)
BILIRUB SERPL-MCNC: 0.7 MG/DL (ref 0.1–1)
BUN SERPL-MCNC: 15 MG/DL (ref 6–20)
CALCIUM SERPL-MCNC: 9.7 MG/DL (ref 8.7–10.5)
CHLORIDE SERPL-SCNC: 105 MMOL/L (ref 95–110)
CHOLEST SERPL-MCNC: 214 MG/DL (ref 120–199)
CHOLEST/HDLC SERPL: 2.5 {RATIO} (ref 2–5)
CO2 SERPL-SCNC: 22 MMOL/L (ref 23–29)
CREAT SERPL-MCNC: 0.9 MG/DL (ref 0.5–1.4)
DIFFERENTIAL METHOD: ABNORMAL
EOSINOPHIL # BLD AUTO: 0 K/UL (ref 0–0.5)
EOSINOPHIL NFR BLD: 0.2 % (ref 0–8)
ERYTHROCYTE [DISTWIDTH] IN BLOOD BY AUTOMATED COUNT: 13.4 % (ref 11.5–14.5)
EST. GFR  (NO RACE VARIABLE): >60 ML/MIN/1.73 M^2
GLUCOSE SERPL-MCNC: 87 MG/DL (ref 70–110)
HCT VFR BLD AUTO: 36.5 % (ref 37–48.5)
HCV AB SERPL QL IA: NORMAL
HDLC SERPL-MCNC: 84 MG/DL (ref 40–75)
HDLC SERPL: 39.3 % (ref 20–50)
HGB BLD-MCNC: 12.2 G/DL (ref 12–16)
HIV 1+2 AB+HIV1 P24 AG SERPL QL IA: NORMAL
IMM GRANULOCYTES # BLD AUTO: 0.02 K/UL (ref 0–0.04)
IMM GRANULOCYTES NFR BLD AUTO: 0.4 % (ref 0–0.5)
LDLC SERPL CALC-MCNC: 116.2 MG/DL (ref 63–159)
LYMPHOCYTES # BLD AUTO: 0.5 K/UL (ref 1–4.8)
LYMPHOCYTES NFR BLD: 10.3 % (ref 18–48)
MCH RBC QN AUTO: 29.8 PG (ref 27–31)
MCHC RBC AUTO-ENTMCNC: 33.4 G/DL (ref 32–36)
MCV RBC AUTO: 89 FL (ref 82–98)
MONOCYTES # BLD AUTO: 0.3 K/UL (ref 0.3–1)
MONOCYTES NFR BLD: 5.7 % (ref 4–15)
NEUTROPHILS # BLD AUTO: 4 K/UL (ref 1.8–7.7)
NEUTROPHILS NFR BLD: 83 % (ref 38–73)
NONHDLC SERPL-MCNC: 130 MG/DL
NRBC BLD-RTO: 0 /100 WBC
PLATELET # BLD AUTO: 145 K/UL (ref 150–450)
PMV BLD AUTO: 10.8 FL (ref 9.2–12.9)
POTASSIUM SERPL-SCNC: 4.2 MMOL/L (ref 3.5–5.1)
PROT SERPL-MCNC: 7.7 G/DL (ref 6–8.4)
RBC # BLD AUTO: 4.09 M/UL (ref 4–5.4)
SODIUM SERPL-SCNC: 135 MMOL/L (ref 136–145)
TRIGL SERPL-MCNC: 69 MG/DL (ref 30–150)
TSH SERPL DL<=0.005 MIU/L-ACNC: 0.95 UIU/ML (ref 0.4–4)
WBC # BLD AUTO: 4.76 K/UL (ref 3.9–12.7)

## 2022-09-15 PROCEDURE — 80053 COMPREHEN METABOLIC PANEL: CPT | Performed by: INTERNAL MEDICINE

## 2022-09-15 PROCEDURE — 84443 ASSAY THYROID STIM HORMONE: CPT | Performed by: INTERNAL MEDICINE

## 2022-09-15 PROCEDURE — 80061 LIPID PANEL: CPT | Performed by: INTERNAL MEDICINE

## 2022-09-15 PROCEDURE — 87389 HIV-1 AG W/HIV-1&-2 AB AG IA: CPT | Performed by: INTERNAL MEDICINE

## 2022-09-15 PROCEDURE — 85025 COMPLETE CBC W/AUTO DIFF WBC: CPT | Performed by: INTERNAL MEDICINE

## 2022-09-15 PROCEDURE — 82306 VITAMIN D 25 HYDROXY: CPT | Performed by: INTERNAL MEDICINE

## 2022-09-15 PROCEDURE — 86803 HEPATITIS C AB TEST: CPT | Performed by: INTERNAL MEDICINE

## 2022-09-15 PROCEDURE — 36415 COLL VENOUS BLD VENIPUNCTURE: CPT | Performed by: INTERNAL MEDICINE

## 2022-09-16 ENCOUNTER — PATIENT MESSAGE (OUTPATIENT)
Dept: INTERNAL MEDICINE | Facility: CLINIC | Age: 22
End: 2022-09-16
Payer: COMMERCIAL

## 2022-09-16 DIAGNOSIS — R19.5 DARK STOOLS: ICD-10-CM

## 2022-09-16 DIAGNOSIS — R10.12 LEFT UPPER QUADRANT ABDOMINAL PAIN: Primary | ICD-10-CM

## 2022-09-20 ENCOUNTER — TELEPHONE (OUTPATIENT)
Dept: INTERNAL MEDICINE | Facility: CLINIC | Age: 22
End: 2022-09-20
Payer: COMMERCIAL

## 2022-09-20 DIAGNOSIS — R19.5 DARK STOOLS: Primary | ICD-10-CM

## 2022-09-20 RX ORDER — PANTOPRAZOLE SODIUM 40 MG/1
40 TABLET, DELAYED RELEASE ORAL DAILY
Qty: 30 TABLET | Refills: 1 | Status: SHIPPED | OUTPATIENT
Start: 2022-09-20 | End: 2022-11-15

## 2022-09-21 NOTE — TELEPHONE ENCOUNTER
Spoke with patient to inform of stool collection kit is ready to be picked up at [facility] Checkout desk

## 2022-09-22 ENCOUNTER — LAB VISIT (OUTPATIENT)
Dept: LAB | Facility: HOSPITAL | Age: 22
End: 2022-09-22
Attending: INTERNAL MEDICINE
Payer: COMMERCIAL

## 2022-09-22 DIAGNOSIS — R19.5 DARK STOOLS: ICD-10-CM

## 2022-09-22 PROCEDURE — 82272 OCCULT BLD FECES 1-3 TESTS: CPT | Performed by: INTERNAL MEDICINE

## 2022-09-23 LAB — OB PNL STL: NEGATIVE

## 2022-10-06 ENCOUNTER — OFFICE VISIT (OUTPATIENT)
Dept: OBSTETRICS AND GYNECOLOGY | Facility: CLINIC | Age: 22
End: 2022-10-06
Payer: COMMERCIAL

## 2022-10-06 VITALS — BODY MASS INDEX: 17.06 KG/M2 | WEIGHT: 115.5 LBS | DIASTOLIC BLOOD PRESSURE: 77 MMHG | SYSTOLIC BLOOD PRESSURE: 114 MMHG

## 2022-10-06 DIAGNOSIS — Z01.419 WELL WOMAN EXAM: Primary | ICD-10-CM

## 2022-10-06 DIAGNOSIS — N94.10 DYSPAREUNIA IN FEMALE: ICD-10-CM

## 2022-10-06 PROCEDURE — 3078F PR MOST RECENT DIASTOLIC BLOOD PRESSURE < 80 MM HG: ICD-10-PCS | Mod: CPTII,S$GLB,, | Performed by: STUDENT IN AN ORGANIZED HEALTH CARE EDUCATION/TRAINING PROGRAM

## 2022-10-06 PROCEDURE — 99385 PR PREVENTIVE VISIT,NEW,18-39: ICD-10-PCS | Mod: S$GLB,,, | Performed by: STUDENT IN AN ORGANIZED HEALTH CARE EDUCATION/TRAINING PROGRAM

## 2022-10-06 PROCEDURE — 1159F MED LIST DOCD IN RCRD: CPT | Mod: CPTII,S$GLB,, | Performed by: STUDENT IN AN ORGANIZED HEALTH CARE EDUCATION/TRAINING PROGRAM

## 2022-10-06 PROCEDURE — 3074F PR MOST RECENT SYSTOLIC BLOOD PRESSURE < 130 MM HG: ICD-10-PCS | Mod: CPTII,S$GLB,, | Performed by: STUDENT IN AN ORGANIZED HEALTH CARE EDUCATION/TRAINING PROGRAM

## 2022-10-06 PROCEDURE — 99999 PR PBB SHADOW E&M-EST. PATIENT-LVL III: CPT | Mod: PBBFAC,,, | Performed by: STUDENT IN AN ORGANIZED HEALTH CARE EDUCATION/TRAINING PROGRAM

## 2022-10-06 PROCEDURE — 88175 CYTOPATH C/V AUTO FLUID REDO: CPT | Performed by: STUDENT IN AN ORGANIZED HEALTH CARE EDUCATION/TRAINING PROGRAM

## 2022-10-06 PROCEDURE — 3008F BODY MASS INDEX DOCD: CPT | Mod: CPTII,S$GLB,, | Performed by: STUDENT IN AN ORGANIZED HEALTH CARE EDUCATION/TRAINING PROGRAM

## 2022-10-06 PROCEDURE — 1159F PR MEDICATION LIST DOCUMENTED IN MEDICAL RECORD: ICD-10-PCS | Mod: CPTII,S$GLB,, | Performed by: STUDENT IN AN ORGANIZED HEALTH CARE EDUCATION/TRAINING PROGRAM

## 2022-10-06 PROCEDURE — 3008F PR BODY MASS INDEX (BMI) DOCUMENTED: ICD-10-PCS | Mod: CPTII,S$GLB,, | Performed by: STUDENT IN AN ORGANIZED HEALTH CARE EDUCATION/TRAINING PROGRAM

## 2022-10-06 PROCEDURE — 3074F SYST BP LT 130 MM HG: CPT | Mod: CPTII,S$GLB,, | Performed by: STUDENT IN AN ORGANIZED HEALTH CARE EDUCATION/TRAINING PROGRAM

## 2022-10-06 PROCEDURE — 3078F DIAST BP <80 MM HG: CPT | Mod: CPTII,S$GLB,, | Performed by: STUDENT IN AN ORGANIZED HEALTH CARE EDUCATION/TRAINING PROGRAM

## 2022-10-06 PROCEDURE — 99385 PREV VISIT NEW AGE 18-39: CPT | Mod: S$GLB,,, | Performed by: STUDENT IN AN ORGANIZED HEALTH CARE EDUCATION/TRAINING PROGRAM

## 2022-10-06 PROCEDURE — 99999 PR PBB SHADOW E&M-EST. PATIENT-LVL III: ICD-10-PCS | Mod: PBBFAC,,, | Performed by: STUDENT IN AN ORGANIZED HEALTH CARE EDUCATION/TRAINING PROGRAM

## 2022-10-11 ENCOUNTER — PATIENT MESSAGE (OUTPATIENT)
Dept: REHABILITATION | Facility: HOSPITAL | Age: 22
End: 2022-10-11

## 2022-10-11 ENCOUNTER — CLINICAL SUPPORT (OUTPATIENT)
Dept: REHABILITATION | Facility: HOSPITAL | Age: 22
End: 2022-10-11
Attending: STUDENT IN AN ORGANIZED HEALTH CARE EDUCATION/TRAINING PROGRAM
Payer: COMMERCIAL

## 2022-10-11 DIAGNOSIS — M62.89 PELVIC FLOOR DYSFUNCTION: ICD-10-CM

## 2022-10-11 DIAGNOSIS — Z01.419 WELL WOMAN EXAM: ICD-10-CM

## 2022-10-11 DIAGNOSIS — M62.838 MUSCLE SPASM: ICD-10-CM

## 2022-10-11 DIAGNOSIS — R10.2 PELVIC PAIN: ICD-10-CM

## 2022-10-11 PROCEDURE — 97161 PT EVAL LOW COMPLEX 20 MIN: CPT | Mod: PO

## 2022-10-11 PROCEDURE — 97530 THERAPEUTIC ACTIVITIES: CPT | Mod: PO

## 2022-10-11 NOTE — PLAN OF CARE
[facility]  Pelvic Health Physical Therapy Initial Evaluation    Date: 10/11/2022   Name: Sunshine Lund  Clinic Number: [id number]  Therapy Diagnosis:   Encounter Diagnoses   Name Primary?    Well woman exam     Pelvic floor dysfunction     Pelvic pain     Muscle spasm      Physician: Aarti Fuentes MD    Physician Orders: Pelvic PT Eval and Treat  Medical Diagnosis from Referral:Well woman exam   Evaluation Date: 10/11/2022  Authorization Period Expiration: TBD  Plan of Care Expiration: 23   Visit # / Visits authorized:     Time In: 9:00  Time Out: 9:35  Total Appointment Time (timed & untimed codes): 35 minutes    Precautions: universal    Subjective     Date of onset: several years ago    History of current condition - Sunshine reports: She is having pain with intercourse that has been going on as long as she's been having sex (4 years). Hurts upon insertion, feels like it takes time (about 20 minutes) for her muscles to relax. Can not recall the last time she had pain free sex. She has tried prescription vaginal suppositories but did not find any benefit (cannot recall the medications). Feels like stress is at an extreme right now - work, money, etc. Seeing therapist through [facility] for general stress management.    OB/GYN History:    Sexually active? yes  Pain with vaginal exams, intercourse or tampon use? Yes. Has pain with pelvic exams. Sharp pain with sex.  Pain is not dependent on position. Able to reach climax - though she finds that it requires maximal effort and minimal satisfaction. Cramping pain in the abdomen the day after intercourse, sometimes.    Bladder/Bowel History:   Frequency of urination:   Daytime: every few hours           Nighttime: no  Difficulty initiating urine stream: No  Urine stream: strong  Complete emptying: Yes  Bladder leakage: No  Urinary Urgency: Yes  Able to delay the urge for as long as needed.  Frequency of bowel movements: once a  day  Difficulty initiating BM: Yes  Quality/Shape of BM: normalizing in consistency - was having issues with loose dark stools (addressed by PCP)  Does Patient Feel Empty after BM? Sometimes, will have to return to the toilet a few times to feel fully empty.  Fiber Supplements or Laxative Use?  No  Colon leakage: No    Prior Therapy/Previous treatment included: dilators, vaginal suppositories (unsure what was in them, but they didn't help)  Social History: lives in a home in [place] with her mother  Current exercise:likes yoga  Occupation: Pt works in a new job at [employer].  Prior Level of Function: independent  Current Level of Function: independent. Painful intercourse and GYN exams    Types of fluid intake: water  Diet: Traditional   Habitus:well developed, well nourished  Abuse/Neglect: No     PAIN:  Location: vagina  Current 0/10, worst 10/10, best 0/10   Description: Sharp  Aggravating Factors/Activities that cause symptoms: Vaginal exam/provocation   Easing Factors: time     Medical History: Sunshine  has no past medical history on file.     Surgical History:  Sunshine Lund  has no past surgical history on file.    Medications: Sunshine has a current medication list which includes the following prescription(s): amitriptyline, buspirone, doxycycline, norgestimate-ethinyl estradiol, pantoprazole, and ziprasidone.    Allergies: Review of patient's allergies indicates:  No Known Allergies     Imaging See EMR for full imaging workup    Pts goals: to not have pain with exams and intercourse.    OBJECTIVE     See EMR under MEDIA for written consent provided 10/11/2022  Chaperone: declined    ORTHO SCREEN  Posture in sitting: slouched   Posture in standing: forward head and forward and rounded shoulders   Pelvic alignment: Not assessed today      VAGINAL PELVIC FLOOR EXAM  Vaginal exam deferred - significant pain with most recent GYN exam. Explained procedure to patient and encouraged pt to let me know if/when she is  ready.    Limitation/Restriction for FOTO Survey    FOTO not set up at time of evaluation       TREATMENT     Treatment Time In: 9:25  Treatment Time Out: 9:35  Total Treatment time (time-based codes) separate from Evaluation: 10 minutes    Therapeutic Activity Patient participated in dynamic functional therapeutic activities to improve functional performance for 10 minutes. Including: Education as described below.     Patient Education provided:   general anatomy/physiology of urinary/ bowel  system, benefits of treatment, risks of treatment, and alternative methods of treatment were discussed with the pt. Additionally, anatomy/physiology of pelvic floor, dilator use, diaphragmatic breathing, and pelvic floor muscle openers were reviewed.     Home Exercises Provided:  Written Home Exercises Provided: yes.  Exercises were reviewed and Sunshine was able to demonstrate them prior to the end of the session.    Sunshine demonstrated good  understanding of the education provided.     See EMR under Patient Instructions for exercises provided 10/11/2022.    Assessment     Sunshine is a 22 y.o. female referred to outpatient Physical Therapy with a medical diagnosis of well woman exam. Pt presents with pelvic floor tenderness, increased tension of the pelvic muscles, and pelvic pain. She is limited by decreased tolerance to pelvic exams and intercourse. She does endorse increased stress which can play a role in severity of vaginismus. She is seeing a therapist virtually. We discussed home dilator use to initiate treatment prior to pelvic examination to reduce discomfort.     Pt prognosis is Good.   Pt will benefit from skilled outpatient Physical Therapy to address the deficits stated above and in the chart below, provide pt/family education, and to maximize pt's level of independence.     Plan of care discussed with patient: Yes  Pt's spiritual, cultural and educational needs considered and patient is agreeable to the plan of care  and goals as stated below:     Anticipated Barriers for therapy: scheduling    Medical Necessity is demonstrated by the following    History  Co-morbidities and personal factors that may impact the plan of care Co-morbidities:   BPD, high stress    Personal Factors:   coping style  lifestyle     low   Examination  Body Structures and Functions, activity limitations and participation restrictions that may impact the plan of care Body Regions/Systems/Functions:  pelvic floor tenderness, increased tension of the pelvic muscles, and pelvic pain     Activity Limitations:  Pain with ADLs    Participation Restrictions:  well woman's exam and relationship with spouse/partner    Activity limitations:   Learning and applying knowledge  no deficits    General Tasks and Commands  no deficits    Communication  no deficits    Mobility  no deficits    Self care  no deficits    Domestic Life  no deficits    Interactions/Relationships  no deficits    Life Areas  no deficits    Community and Social Life  no deficits       low   Clinical Presentation stable and uncomplicated low   Decision Making/ Complexity Score: low     Short Term Goals: 6 weeks   Pt to demonstrate tolerance to gentle manual therapy to allow for pain-free gynecological exams.  Pt to demonstrate tolerance to dilator size 1 for 5 minutes to allow for tampon use.  Pt to demonstrate proper diaphragmatic breathing to help with calming the nervous system in order to decrease pain and to improve abdominal wall musculature extensibility.   Pt to report minimal pain with palpation of abdominal wall due to improvement in soft tissue mobility from moderate to minimal restrictions.  Pt will report minimal to no pain with single digit pelvic assessment and display relaxation during this assessment in order to progress to dilator use.  Pt will report successful tampon use with < or = 2/10  pain for improved activity tolerance.      Long Term Goals: 16 weeks   Pt to demonstrate  tolerance to dilator size 5 to allow for pain-free intercourse.  Pt to report decreased incidence of PF tension at rest (no more than 20% of the time) to demonstrate functional mechanics and motor patterns.  Pt to be discharged with home plan for carry over after discharge.   Pt will be trained and compliant with postural strategies in sitting and standing to improve alignment and decrease pain and muscle fatigue  Pt to report being able to have a comfortable vaginal exam without significant increase in pelvic pain.  Pt will be independent with use of dilation in order to progress towards self management and intercourse.  Pt will report successful tampon use with no pain reported to demonstrate a return towards prior level of function.       Plan     Plan of care Certification: 10/11/2022 to 01/31/23.    Outpatient Physical Therapy 1 times weekly for 16 weeks to include the following interventions: therapeutic exercises, therapeutic activity, neuromuscular re-education, manual therapy, modalities PRN, patient/family education, dry needling, and self care/home management    I appreciate your consult and look forward to participating in this patient's care.    Debby Parra, PT, DPT

## 2022-10-11 NOTE — PATIENT INSTRUCTIONS
DIAPHRAGMATIC BREATHING     The diaphragm is a dome shaped muscle that forms the floor of the rib cage. It is the most efficient muscle for breathing and relaxation, although most people are not used to using the diaphragm. Diaphragmatic or belly breathing is an important technique to learn because it helps settle down or relax the autonomic nervous system. The correct use of diaphragmatic breathing can help to quiet brain activity resulting in the relaxation of all the muscles and organs of the body. This is accomplished by slow rhythmic breathing concentrated in the diaphragm muscle rather than the chest.    How to do proper relaxation breathing:    Start by lying on your back or reclining in a chair in a relaxed position. Place one hand on your chest and the other on your abdomen.  Relax your jaw by placing your tongue on the roof of your mouth and keeping your teeth slightly apart.   Take a deep breath in, letting the abdomen expand and rise while you keep your upper chest, neck and shoulders relaxed. Think about sending air downwards into your pelvic floor and allow it to gently bulge outward.  As you breathe out, allow your abdomen and chest to fall. Exhale completely. Allow your pelvic floor to naturally come back to its resting position.  It doesn't matter if you breathe in/out through your nose and/or mouth. Do whichever feels comfortable.  Remember to breathe slowly.  Do not force your breathing. Do not hold your breath.  Repeat for 3-5 minutes every day, before beginning with dilator training.        Home Exercise Program: 10/11/2022    DILATOR TRAINING  Starting with the smallest size for now:     1. Make sure the dilator is clean, free of any visible soiling.   2. Apply water-based lubricant (ie. Slippery stuff, Good Clean Love) to the dilator and the vaginal opening.   3. Lay back with your back well supported by several pillows and both knees bent.   4. Spread labia and place the dilator right at  the opening of the vagina.     5. Hold dilator at vaginal opening for a minute.  6. Begin Diaphragmatic Breathing and focus on DROPPING the pelvic floor muscles, releasing their tension.   7. Once the discomfort has dissipated, insert the dilator a little bit more, and repeat this process.  8. Next move the dilator side/side 10 times, downward/back to neutral 10 times, then in/out 10 times to get the tissues use to being mobilized.   9. Continue for 10-15 minutes.      10. Once finished, remove dilator.  11. Wash with gentle anti-bacterial soap and thoroughly rinse. Let air dry whenever possible. Store in a dry, clean location.   12. Repeat 7 of the 7 days per week.     As a rule of thumb, do not let your pain get above a 3/10!     Don't apply so much pressure that it leaves you too sore to perform again the next day.     Once you are able to complete 3 sessions with the dilator fully inserted and with movement of the dilator in all directions, you may move on to the next size.     STOP if there is increased bleeding, an infection, or extreme pain.            YOGA POSES   to improve pelvic floor muscle DROP/RELAXATION.  Maintain each position for 1-2 minutes, as needed.  Use your belly breathing during these poses.    Happy Baby               Figure 4 stretch    Supine Butterfly      Double Knee to Chest    Single Knee to Chest  Cat-Cow      Child's Pose  Deep Squat (this one can be performed against a wall)

## 2022-10-12 ENCOUNTER — OFFICE VISIT (OUTPATIENT)
Dept: PSYCHIATRY | Facility: CLINIC | Age: 22
End: 2022-10-12
Payer: COMMERCIAL

## 2022-10-12 DIAGNOSIS — F31.81 BIPOLAR 2 DISORDER: Primary | ICD-10-CM

## 2022-10-12 DIAGNOSIS — F41.1 GENERALIZED ANXIETY DISORDER WITH PANIC ATTACKS: ICD-10-CM

## 2022-10-12 DIAGNOSIS — F41.0 GENERALIZED ANXIETY DISORDER WITH PANIC ATTACKS: ICD-10-CM

## 2022-10-12 LAB
CLINICAL INFO: NORMAL
CYTO CVX: NORMAL
CYTOLOGIST CVX/VAG CYTO: NORMAL
CYTOLOGIST CVX/VAG CYTO: NORMAL
CYTOLOGY CMNT CVX/VAG CYTO-IMP: NORMAL
CYTOLOGY PAP THIN PREP EXPLANATION: NORMAL
DATE OF PREVIOUS PAP: NO
DATE PREVIOUS BX: NO
GEN CATEG CVX/VAG CYTO-IMP: NORMAL
LMP START DATE: NORMAL
MICROORGANISM CVX/VAG CYTO: NORMAL
PATHOLOGIST CVX/VAG CYTO: NORMAL
SERVICE CMNT-IMP: NORMAL
SPECIMEN SOURCE CVX/VAG CYTO: NORMAL
STAT OF ADQ CVX/VAG CYTO-IMP: NORMAL

## 2022-10-12 PROCEDURE — 1159F PR MEDICATION LIST DOCUMENTED IN MEDICAL RECORD: ICD-10-PCS | Mod: CPTII,95,S$GLB, | Performed by: PHYSICIAN ASSISTANT

## 2022-10-12 PROCEDURE — 1160F PR REVIEW ALL MEDS BY PRESCRIBER/CLIN PHARMACIST DOCUMENTED: ICD-10-PCS | Mod: CPTII,95,S$GLB, | Performed by: PHYSICIAN ASSISTANT

## 2022-10-12 PROCEDURE — 1160F RVW MEDS BY RX/DR IN RCRD: CPT | Mod: CPTII,95,S$GLB, | Performed by: PHYSICIAN ASSISTANT

## 2022-10-12 PROCEDURE — 99214 PR OFFICE/OUTPT VISIT, EST, LEVL IV, 30-39 MIN: ICD-10-PCS | Mod: 95,S$GLB,, | Performed by: PHYSICIAN ASSISTANT

## 2022-10-12 PROCEDURE — 1159F MED LIST DOCD IN RCRD: CPT | Mod: CPTII,95,S$GLB, | Performed by: PHYSICIAN ASSISTANT

## 2022-10-12 PROCEDURE — 99999 PR PBB SHADOW E&M-EST. PATIENT-LVL II: CPT | Mod: PBBFAC,,, | Performed by: PHYSICIAN ASSISTANT

## 2022-10-12 PROCEDURE — 99214 OFFICE O/P EST MOD 30 MIN: CPT | Mod: 95,S$GLB,, | Performed by: PHYSICIAN ASSISTANT

## 2022-10-12 PROCEDURE — 99999 PR PBB SHADOW E&M-EST. PATIENT-LVL II: ICD-10-PCS | Mod: PBBFAC,,, | Performed by: PHYSICIAN ASSISTANT

## 2022-10-12 RX ORDER — BUSPIRONE HYDROCHLORIDE 10 MG/1
20 TABLET ORAL 3 TIMES DAILY
Qty: 90 TABLET | Refills: 1 | Status: SHIPPED | OUTPATIENT
Start: 2022-10-12 | End: 2022-11-15 | Stop reason: SDUPTHER

## 2022-10-12 RX ORDER — ZIPRASIDONE HYDROCHLORIDE 20 MG/1
20 CAPSULE ORAL 2 TIMES DAILY
Qty: 180 CAPSULE | Refills: 1 | Status: SHIPPED | OUTPATIENT
Start: 2022-10-12 | End: 2023-01-13 | Stop reason: SDUPTHER

## 2022-10-12 NOTE — PROGRESS NOTES
Outpatient Psychiatry Follow-Up Visit ([facility]UI)    10/12/2022    Clinical Status of Patient:  Outpatient (Ambulatory)    The patient location is: Home in [place]  The chief complaint leading to consultation is: mood disorder    Visit type: audiovisual    Face to Face time with patient: 11  15 minutes of total time spent on the encounter, which includes face to face time and non-face to face time preparing to see the patient (eg, review of tests), Obtaining and/or reviewing separately obtained history, Documenting clinical information in the electronic or other health record, Independently interpreting results (not separately reported) and communicating results to the patient/family/caregiver, or Care coordination (not separately reported).     Each patient to whom he or she provides medical services by telemedicine is:  (1) informed of the relationship between the physician and patient and the respective role of any other health care provider with respect to management of the patient; and (2) notified that he or she may decline to receive medical services by telemedicine and may withdraw from such care at any time.        Chief Complaint:  Sunshine Lund is a 22 y.o. female who presents today for follow-up of mood disorder and anxiety.  Met with patient.      Interval History and Content of Current Session:  Interim Events/Subjective Report/Content of Current Session:   Patient returns for follow up of bipolar II and anxiety. She is currently taking Buspirone 15 mg BID, amitriptyline 12.5 mg qHS, and Ziprasidone 40 mg AM and 20 mg PM.  Reports her mood has been stable and positive, but anxiety has been worse.  Got a new job and has some medical bills.    Review of Systems   PSYCHIATRIC: Pertinant items are noted in the narrative.  CONSTITUTIONAL: No weight gain or loss.   MUSCULOSKELETAL: No pain or stiffness of the joints.  NEUROLOGIC: No weakness, sensory changes, seizures, confusion, memory loss, tremor or other  abnormal movements.    Past Medical, Family and Social History: The patient's past medical, family and social history have been reviewed and updated as appropriate within the electronic medical record - see encounter notes.    Compliance: yes    Side effects: None    Risk Parameters:  Patient reports no suicidal ideation  Patient reports no homicidal ideation  Patient reports no self-injurious behavior  Patient reports no violent behavior    Exam (detailed: at least 9 elements; comprehensive: all 15 elements)   Constitutional  Vitals:  Most recent vital signs, dated less than 90 days prior to this appointment, were reviewed.   There were no vitals filed for this visit.     General:  unremarkable, age appropriate, casually dressed     Musculoskeletal  Muscle Strength/Tone:  not examined   Gait & Station:  not examined     Psychiatric  Speech:  no latency; no press   Mood & Affect:  steady  congruent and appropriate   Thought Process:  normal and logical   Associations:  intact   Thought Content:  normal, no suicidality, no homicidality, delusions, or paranoia   Insight:  intact   Judgement: behavior is adequate to circumstances   Orientation:  grossly intact   Memory: intact for content of interview   Language: grossly intact   Attention Span & Concentration:  able to focus   Fund of Knowledge:  intact and appropriate to age and level of education     Assessment and Diagnosis   Status/Progress: Based on the examination today, the patient's problem(s) is/are adequately but not ideally controlled.  New problems have not been presented today.   Diagnostic uncertainty and Lack of compliance are not complicating management of the primary condition.  There are no active rule-out diagnoses for this patient at this time.     General Impression: BP2 and AMANDA symptoms improved but not ideally controlled with Ziprasidone 40 mg AM and 20 mg PM, and Buspirone 15 mg BID, with amitriptyline 12.5 mg nightly. Sleeping well.         ICD-10-CM ICD-9-CM   1. Bipolar 2 disorder  F31.81 296.89   2. Generalized anxiety disorder with panic attacks  F41.1 300.02    F41.0 300.01         1. Increase buspirone to 20 mg BID-TID targeting anxiety.  2. Continue ziprasidone 40 mg qAM and 20 mg qPM.  3. Continue amitriptyline 12.5 mg nightly.  4. Follow up in one month    Intervention/Counseling/Treatment Plan   Medication Management: Continue current medications. The risks and benefits of medication were discussed with the patient.      Return to Clinic: 1 month

## 2022-11-10 ENCOUNTER — CLINICAL SUPPORT (OUTPATIENT)
Dept: REHABILITATION | Facility: HOSPITAL | Age: 22
End: 2022-11-10
Attending: STUDENT IN AN ORGANIZED HEALTH CARE EDUCATION/TRAINING PROGRAM
Payer: COMMERCIAL

## 2022-11-10 DIAGNOSIS — R10.2 PELVIC PAIN: ICD-10-CM

## 2022-11-10 DIAGNOSIS — M62.838 MUSCLE SPASM: ICD-10-CM

## 2022-11-10 DIAGNOSIS — M62.89 PELVIC FLOOR DYSFUNCTION: Primary | ICD-10-CM

## 2022-11-10 PROCEDURE — 97530 THERAPEUTIC ACTIVITIES: CPT | Mod: PO

## 2022-11-10 PROCEDURE — 97140 MANUAL THERAPY 1/> REGIONS: CPT | Mod: PO
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
## 2022-11-10 NOTE — PATIENT INSTRUCTIONS
Introital Stretch    1. Wash hands thoroughly with antibacterial soap.  2. Lay down comfortably with your back and head well supported by several pillows.  3. Apply water-based lubricant (ie. Slippery stuff, coconut oil, olive oil) on your thumb and perineum.  4. Place one thumb to 1st knuckle just inside the vagina. Or you can use the smallest size dilator.  5. Gently press downward at 6 o' clock. Hold for 30 seconds. Perform Diaphragmatic breathing while holding the stretch. Repeat at 3, 4, 5, 7, 8, 9 o'clock. The amount of pressure for the stretch may cause discomfort, but not pain (ie. You can replicate the stretching sensation by bending your finger backward). Don't go above 3/10 pain during or after the exericse.  7. Focus on relaxed breathing and keeping the pelvic floor muscles dropped.   8. Continue for 5 minutes, 3-5 times per week, or every time you use your dilators.    STOP if there is increased bleeding, an infection, or extreme pain.

## 2022-11-15 ENCOUNTER — OFFICE VISIT (OUTPATIENT)
Dept: PSYCHIATRY | Facility: CLINIC | Age: 22
End: 2022-11-15
Payer: COMMERCIAL

## 2022-11-15 ENCOUNTER — CLINICAL SUPPORT (OUTPATIENT)
Dept: REHABILITATION | Facility: HOSPITAL | Age: 22
End: 2022-11-15
Attending: STUDENT IN AN ORGANIZED HEALTH CARE EDUCATION/TRAINING PROGRAM
Payer: COMMERCIAL

## 2022-11-15 DIAGNOSIS — M62.838 MUSCLE SPASM: ICD-10-CM

## 2022-11-15 DIAGNOSIS — M62.89 PELVIC FLOOR DYSFUNCTION: Primary | ICD-10-CM

## 2022-11-15 DIAGNOSIS — F31.81 BIPOLAR 2 DISORDER: Primary | ICD-10-CM

## 2022-11-15 DIAGNOSIS — F41.1 GENERALIZED ANXIETY DISORDER WITH PANIC ATTACKS: ICD-10-CM

## 2022-11-15 DIAGNOSIS — F41.0 GENERALIZED ANXIETY DISORDER WITH PANIC ATTACKS: ICD-10-CM

## 2022-11-15 DIAGNOSIS — R10.2 PELVIC PAIN: ICD-10-CM

## 2022-11-15 PROCEDURE — 90833 PR PSYCHOTHERAPY W/PATIENT W/E&M, 30 MIN (ADD ON): ICD-10-PCS | Mod: 95,,, | Performed by: PHYSICIAN ASSISTANT

## 2022-11-15 PROCEDURE — 99999 PR PBB SHADOW E&M-EST. PATIENT-LVL II: CPT | Mod: PBBFAC,,, | Performed by: PHYSICIAN ASSISTANT

## 2022-11-15 PROCEDURE — 97530 THERAPEUTIC ACTIVITIES: CPT | Mod: PO

## 2022-11-15 PROCEDURE — 1159F PR MEDICATION LIST DOCUMENTED IN MEDICAL RECORD: ICD-10-PCS | Mod: CPTII,95,, | Performed by: PHYSICIAN ASSISTANT

## 2022-11-15 PROCEDURE — 97140 MANUAL THERAPY 1/> REGIONS: CPT | Mod: PO

## 2022-11-15 PROCEDURE — 99215 PR OFFICE/OUTPT VISIT, EST, LEVL V, 40-54 MIN: ICD-10-PCS | Mod: 95,,, | Performed by: PHYSICIAN ASSISTANT

## 2022-11-15 PROCEDURE — 1160F RVW MEDS BY RX/DR IN RCRD: CPT | Mod: CPTII,95,, | Performed by: PHYSICIAN ASSISTANT

## 2022-11-15 PROCEDURE — 1160F PR REVIEW ALL MEDS BY PRESCRIBER/CLIN PHARMACIST DOCUMENTED: ICD-10-PCS | Mod: CPTII,95,, | Performed by: PHYSICIAN ASSISTANT

## 2022-11-15 PROCEDURE — 90833 PSYTX W PT W E/M 30 MIN: CPT | Mod: 95,,, | Performed by: PHYSICIAN ASSISTANT

## 2022-11-15 PROCEDURE — 99215 OFFICE O/P EST HI 40 MIN: CPT | Mod: 95,,, | Performed by: PHYSICIAN ASSISTANT

## 2022-11-15 PROCEDURE — 1159F MED LIST DOCD IN RCRD: CPT | Mod: CPTII,95,, | Performed by: PHYSICIAN ASSISTANT

## 2022-11-15 PROCEDURE — 99999 PR PBB SHADOW E&M-EST. PATIENT-LVL II: ICD-10-PCS | Mod: PBBFAC,,, | Performed by: PHYSICIAN ASSISTANT

## 2022-11-15 RX ORDER — BUSPIRONE HYDROCHLORIDE 10 MG/1
20 TABLET ORAL 3 TIMES DAILY
Qty: 180 TABLET | Refills: 2 | Status: SHIPPED | OUTPATIENT
Start: 2022-11-15 | End: 2023-02-15 | Stop reason: SDUPTHER

## 2022-11-15 RX ORDER — AMITRIPTYLINE HYDROCHLORIDE 25 MG/1
25 TABLET, FILM COATED ORAL NIGHTLY
Qty: 90 TABLET | Refills: 0 | Status: SHIPPED | OUTPATIENT
Start: 2022-11-15 | End: 2023-01-13 | Stop reason: SDUPTHER

## 2022-11-15 NOTE — PROGRESS NOTES
Pelvic Health Physical Therapy   Treatment Note     Name: Sunshine Lund  Clinic Number: [id number]    Therapy Diagnosis:   Encounter Diagnoses   Name Primary?    Pelvic floor dysfunction Yes    Pelvic pain     Muscle spasm      Physician: Aarti Fuentes MD    Visit Date: 11/15/2022    Physician Orders: Pelvic PT Eval and Treat  Medical Diagnosis from Referral:Well woman exam   Evaluation Date: 10/11/2022  Authorization Period Expiration: TBD  Plan of Care Expiration: 01/31/23   Visit # / Visits authorized: 2/20     Cancelled Visits: 1  No Show Visits: 0    Time In: 4:00  Time Out: 4:40  Total Billable Time: 40 minutes    Precautions: Standard    Subjective     Pt reports: She was able to tolerate dilator size 2 or 3 for one session.   She was somewhat compliant with home exercise program.  Response to previous treatment: no worsening of symptoms  Functional change: ongoing    Pain: 0/10  Location: none at rest    Constitutional Symptoms Review:  Cough      Objective   Pt verbally consents to intravaginal treatment today.  Signed consent form already on file.     Sunshine received the following manual therapy techniques: to develop extensibility, desensitization, and release muscle spasms  for 30 minutes: see chart    Sunshine participated in dynamic functional therapeutic activities to improve functional performance for 10 minutes: see chart    Type Intervention 11/10/22 11/15/22   Manual STM to pelvic  1,2,3 x x   Manual  Introital stretch x x   Manual  Lateral/posterior stretching to entirety of pelvic floor mm x x   tTherac Educated on self-introital stretch w/ or w/o dilators x    Therac Reviewed HEP, POC progression x x                               Home Exercises Provided and Patient Education Provided     Education provided:   - cont prior HEP  Discussed progression of plan of care with patient; educated pt in activity modification; reviewed HEP with pt. Pt demonstrated and verbalized understanding  of all instruction and was provided with a handout of HEP (see Patient Instructions).    Written Home Exercises Provided: yes.  Exercises were reviewed and Sunshine was able to demonstrate them prior to the end of the session.  Sunshine demonstrated good  understanding of the education provided.     See EMR under Patient Instructions for exercises provided 11/10/2022.    Assessment     Sunshine tolerated treatment well and demonstrated understanding of all education provided at today's visit. She presented with overall increased tension in the pelvic floor muscles, L>R, though this has improved from last visit. She tolerated manual treatments well and noted minimal tenderness near the vaginal opening that improved over the course of the session.    Sunshine Is progressing well towards her goals.   Pt prognosis is Good.     Pt will continue to benefit from skilled outpatient physical therapy to address the deficits listed in the problem list box on initial evaluation, provide pt/family education and to maximize pt's level of independence in the home and community environment.     Pt's spiritual, cultural and educational needs considered and pt agreeable to plan of care and goals.     Anticipated barriers to physical therapy: scheduling    Short Term Goals: 6 weeks   Pt to demonstrate tolerance to gentle manual therapy to allow for pain-free gynecological exams.  Pt to demonstrate tolerance to dilator size 1 for 5 minutes to allow for tampon use.  Pt to demonstrate proper diaphragmatic breathing to help with calming the nervous system in order to decrease pain and to improve abdominal wall musculature extensibility.   Pt to report minimal pain with palpation of abdominal wall due to improvement in soft tissue mobility from moderate to minimal restrictions.  Pt will report minimal to no pain with single digit pelvic assessment and display relaxation during this assessment in order to progress to dilator use.  Pt will report  successful tampon use with < or = 2/10  pain for improved activity tolerance.       Long Term Goals: 16 weeks   Pt to demonstrate tolerance to dilator size 5 to allow for pain-free intercourse.  Pt to report decreased incidence of PF tension at rest (no more than 20% of the time) to demonstrate functional mechanics and motor patterns.  Pt to be discharged with home plan for carry over after discharge.   Pt will be trained and compliant with postural strategies in sitting and standing to improve alignment and decrease pain and muscle fatigue  Pt to report being able to have a comfortable vaginal exam without significant increase in pelvic pain.  Pt will be independent with use of dilation in order to progress towards self management and intercourse.  Pt will report successful tampon use with no pain reported to demonstrate a return towards prior level of function.     Plan     Continue per established POC.    Debby Parra, PT , DPT
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
## 2022-11-17 ENCOUNTER — CLINICAL SUPPORT (OUTPATIENT)
Dept: REHABILITATION | Facility: HOSPITAL | Age: 22
End: 2022-11-17
Attending: STUDENT IN AN ORGANIZED HEALTH CARE EDUCATION/TRAINING PROGRAM
Payer: COMMERCIAL

## 2022-11-17 DIAGNOSIS — R10.2 PELVIC PAIN: ICD-10-CM

## 2022-11-17 DIAGNOSIS — M62.838 MUSCLE SPASM: ICD-10-CM

## 2022-11-17 DIAGNOSIS — M62.89 PELVIC FLOOR DYSFUNCTION: Primary | ICD-10-CM

## 2022-11-17 PROCEDURE — 97140 MANUAL THERAPY 1/> REGIONS: CPT | Mod: PO

## 2022-11-17 NOTE — PROGRESS NOTES
Pelvic Health Physical Therapy   Treatment Note     Name: Sunshine Lund  Clinic Number: [id number]    Therapy Diagnosis:   Encounter Diagnoses   Name Primary?    Pelvic floor dysfunction Yes    Pelvic pain     Muscle spasm      Physician: Aarti Fuentes MD    Visit Date: 11/17/2022    Physician Orders: Pelvic PT Eval and Treat  Medical Diagnosis from Referral:Well woman exam   Evaluation Date: 10/11/2022  Authorization Period Expiration: TBD  Plan of Care Expiration: 01/31/23   Visit # / Visits authorized: 3/20     Cancelled Visits: 1  No Show Visits: 0    Time In: 8:00  Time Out: 8:55  Total Billable Time: 55 minutes    Precautions: Standard    Subjective     Pt reports: She has no new complaints  She was somewhat compliant with home exercise program.  Response to previous treatment: no worsening of symptoms  Functional change: ongoing    Pain: 0/10  Location: none at rest    Constitutional Symptoms Review:  Cough      Objective   Pt verbally consents to intravaginal treatment today.  Signed consent form already on file.     Sunshine received the following manual therapy techniques: to develop extensibility, desensitization, and release muscle spasms  for 55 minutes: see chart    Type Intervention 11/10/22 11/15/22 11/17/22   Manual STM to pelvic  1,2,3 x x x   Manual  Introital stretch x x x   Manual  Lateral/posterior stretching to entirety of pelvic floor mm x x x   tTherac Educated on self-introital stretch w/ or w/o dilators x     Therac Reviewed HEP, POC progression x x                                    Home Exercises Provided and Patient Education Provided     Education provided:   - cont prior HEP  Discussed progression of plan of care with patient; educated pt in activity modification; reviewed HEP with pt. Pt demonstrated and verbalized understanding of all instruction and was provided with a handout of HEP (see Patient Instructions).    Written Home Exercises Provided: yes.  Exercises were  reviewed and Sunshine was able to demonstrate them prior to the end of the session.  Sunshine demonstrated good  understanding of the education provided.     See EMR under Patient Instructions for exercises provided 11/10/2022.    Assessment     Sunshine tolerated treatment well and demonstrated understanding of all education provided at today's visit. She continues to present with overall increased tension in the pelvic floor muscles, but has good ability to relax consciously. She noted the most tenderness at the introitus on the left, around 4 oclock.    Sunshine Is progressing well towards her goals.   Pt prognosis is Good.     Pt will continue to benefit from skilled outpatient physical therapy to address the deficits listed in the problem list box on initial evaluation, provide pt/family education and to maximize pt's level of independence in the home and community environment.     Pt's spiritual, cultural and educational needs considered and pt agreeable to plan of care and goals.     Anticipated barriers to physical therapy: scheduling    Short Term Goals: 6 weeks   Pt to demonstrate tolerance to gentle manual therapy to allow for pain-free gynecological exams.  Pt to demonstrate tolerance to dilator size 1 for 5 minutes to allow for tampon use.  Pt to demonstrate proper diaphragmatic breathing to help with calming the nervous system in order to decrease pain and to improve abdominal wall musculature extensibility.   Pt to report minimal pain with palpation of abdominal wall due to improvement in soft tissue mobility from moderate to minimal restrictions.  Pt will report minimal to no pain with single digit pelvic assessment and display relaxation during this assessment in order to progress to dilator use.  Pt will report successful tampon use with < or = 2/10  pain for improved activity tolerance.       Long Term Goals: 16 weeks   Pt to demonstrate tolerance to dilator size 5 to allow for pain-free intercourse.  Pt to  report decreased incidence of PF tension at rest (no more than 20% of the time) to demonstrate functional mechanics and motor patterns.  Pt to be discharged with home plan for carry over after discharge.   Pt will be trained and compliant with postural strategies in sitting and standing to improve alignment and decrease pain and muscle fatigue  Pt to report being able to have a comfortable vaginal exam without significant increase in pelvic pain.  Pt will be independent with use of dilation in order to progress towards self management and intercourse.  Pt will report successful tampon use with no pain reported to demonstrate a return towards prior level of function.     Plan     Continue per established POC.    Debby Parra, PT , DPT

## 2022-11-22 ENCOUNTER — CLINICAL SUPPORT (OUTPATIENT)
Dept: REHABILITATION | Facility: HOSPITAL | Age: 22
End: 2022-11-22
Attending: STUDENT IN AN ORGANIZED HEALTH CARE EDUCATION/TRAINING PROGRAM
Payer: COMMERCIAL

## 2022-11-22 DIAGNOSIS — M62.838 MUSCLE SPASM: ICD-10-CM

## 2022-11-22 DIAGNOSIS — M62.89 PELVIC FLOOR DYSFUNCTION: ICD-10-CM

## 2022-11-22 DIAGNOSIS — R10.2 PELVIC PAIN: ICD-10-CM

## 2022-11-22 PROCEDURE — 97140 MANUAL THERAPY 1/> REGIONS: CPT | Mod: PO

## 2022-11-22 NOTE — PATIENT INSTRUCTIONS
Lubricant Recommendations:    Water Based Options: Safe to use with condoms and devices  1) Slippery Stuff (what we use in the clinic) - available on [employer]  2) [employer] - getmaude.com  3) Good Clean Love Almost Naked- available on [employer] and some drug stores    Silicone Based Options: NOT recommended for use with devices. Longer lasting and silkier feel  1) UberLube - available on [employer]      General Recommendations:   - Look for products WITHOUT glycerin, parabens, petroleum, fragrances, scents, glitter,etc.  - Coconut Oil is also a good option to use for long lasting moisture. Keep in mind that using anything oil-based may interfere with condom use and cause them to be ineffective.

## 2022-11-22 NOTE — PROGRESS NOTES
Pelvic Health Physical Therapy   Treatment Note     Name: Sunshine Lund  Clinic Number: [id number]    Therapy Diagnosis:   Encounter Diagnoses   Name Primary?    Pelvic floor dysfunction     Pelvic pain     Muscle spasm      Physician: Aarti Fuentes MD    Visit Date: 11/22/2022    Physician Orders: Pelvic PT Eval and Treat  Medical Diagnosis from Referral:Well woman exam   Evaluation Date: 10/11/2022  Authorization Period Expiration: 12/31/22  Plan of Care Expiration: 01/31/23   Visit # / Visits authorized: 4/20     Cancelled Visits: 1  No Show Visits: 0    Time In: 4:00  Time Out: 4:53  Total Billable Time: 53 minutes    Precautions: Standard    Subjective     Pt reports: No new complaints. Sharp deep pain with sex last night - took 5-10 minutes to relax fully. Lubricant may be an issue so she plans to get some at the store.  She was somewhat compliant with home exercise program.  Response to previous treatment: no worsening of symptoms  Functional change: ongoing    Pain: 0/10  Location: none at rest    Constitutional Symptoms Review:    Objective   Pt verbally consents to intravaginal treatment today.  Signed consent form already on file.     Sunshine received the following manual therapy techniques: to develop extensibility, desensitization, and release muscle spasms  for 55 minutes: see chart    Type Intervention 11/10/22 11/15/22 11/17/22 11/22/22   Manual STM to pelvic  1,2,3 x x x x   Manual  Introital stretch x x x x   Manual  Lateral/posterior stretching to entirety of pelvic floor mm x x x x   Manual Sustained holds to puborectalis, coccygeus    x   Therac Educated on self-introital stretch w/ or w/o dilators x      Therac Reviewed HEP, POC progression x x                                         Home Exercises Provided and Patient Education Provided     Education provided:   - cont prior HEP  Discussed progression of plan of care with patient; educated pt in activity modification; reviewed  HEP with pt. Pt demonstrated and verbalized understanding of all instruction and was provided with a handout of HEP (see Patient Instructions).    Written Home Exercises Provided: yes.  Exercises were reviewed and Sunshine was able to demonstrate them prior to the end of the session.  Sunshine demonstrated good  understanding of the education provided.     See EMR under Patient Instructions for exercises provided 11/10/2022.    Assessment     Sunshine tolerated treatment well and demonstrated understanding of all education provided at today's visit. She continues to present with overall increased tension in the pelvic floor muscles, but has good ability to relax consciously. She noted tenderness to B bulbospongiosus at 5/7 o'clock roughly, as well as B puborectalis.     Sunshine Is progressing well towards her goals.   Pt prognosis is Good.     Pt will continue to benefit from skilled outpatient physical therapy to address the deficits listed in the problem list box on initial evaluation, provide pt/family education and to maximize pt's level of independence in the home and community environment.     Pt's spiritual, cultural and educational needs considered and pt agreeable to plan of care and goals.     Anticipated barriers to physical therapy: scheduling    Short Term Goals: 6 weeks   Pt to demonstrate tolerance to gentle manual therapy to allow for pain-free gynecological exams.  Pt to demonstrate tolerance to dilator size 1 for 5 minutes to allow for tampon use.  Pt to demonstrate proper diaphragmatic breathing to help with calming the nervous system in order to decrease pain and to improve abdominal wall musculature extensibility.   Pt to report minimal pain with palpation of abdominal wall due to improvement in soft tissue mobility from moderate to minimal restrictions.  Pt will report minimal to no pain with single digit pelvic assessment and display relaxation during this assessment in order to progress to dilator  use.  Pt will report successful tampon use with < or = 2/10  pain for improved activity tolerance.       Long Term Goals: 16 weeks   Pt to demonstrate tolerance to dilator size 5 to allow for pain-free intercourse.  Pt to report decreased incidence of PF tension at rest (no more than 20% of the time) to demonstrate functional mechanics and motor patterns.  Pt to be discharged with home plan for carry over after discharge.   Pt will be trained and compliant with postural strategies in sitting and standing to improve alignment and decrease pain and muscle fatigue  Pt to report being able to have a comfortable vaginal exam without significant increase in pelvic pain.  Pt will be independent with use of dilation in order to progress towards self management and intercourse.  Pt will report successful tampon use with no pain reported to demonstrate a return towards prior level of function.     Plan     Continue per established POC.    Debby Parra, PT , DPT

## 2022-11-28 ENCOUNTER — CLINICAL SUPPORT (OUTPATIENT)
Dept: REHABILITATION | Facility: HOSPITAL | Age: 22
End: 2022-11-28
Attending: STUDENT IN AN ORGANIZED HEALTH CARE EDUCATION/TRAINING PROGRAM
Payer: COMMERCIAL

## 2022-11-28 DIAGNOSIS — M62.89 PELVIC FLOOR DYSFUNCTION: Primary | ICD-10-CM

## 2022-11-28 DIAGNOSIS — M62.838 MUSCLE SPASM: ICD-10-CM

## 2022-11-28 DIAGNOSIS — R10.2 PELVIC PAIN: ICD-10-CM

## 2022-11-28 PROCEDURE — 97140 MANUAL THERAPY 1/> REGIONS: CPT | Mod: PO

## 2022-11-28 PROCEDURE — 97110 THERAPEUTIC EXERCISES: CPT | Mod: PO

## 2022-11-28 NOTE — PROGRESS NOTES
Pelvic Health Physical Therapy   Treatment/Progress Note     Name: Sunshine Lund  Clinic Number: [id number]    Therapy Diagnosis:   Encounter Diagnoses   Name Primary?    Pelvic floor dysfunction Yes    Pelvic pain     Muscle spasm      Physician: Aarti Fuentes MD    Visit Date: 11/28/2022    Physician Orders: Pelvic PT Eval and Treat  Medical Diagnosis from Referral:Well woman exam   Evaluation Date: 10/11/2022  Authorization Period Expiration: 12/31/22  Plan of Care Expiration: 01/31/23   Visit # / Visits authorized: 5/20  Cancelled Visits: 1  No Show Visits: 0    Time In: 4:35  Time Out: 5:25  Total Billable Time: 50 minutes    Precautions: Standard    Subjective     Pt reports: No new complaints. Used dilator size 3 with minimal pain.  She was somewhat compliant with home exercise program.  Response to previous treatment: no worsening of symptoms  Functional change: ongoing    Pain: 0/10  Location: none at rest    Constitutional Symptoms Review:    Objective   Pt verbally consents to intravaginal treatment today.  Signed consent form already on file.     Sunshine received the following manual therapy techniques: to develop extensibility, desensitization, and release muscle spasms  for 25 minutes: see chart    Sunshine received therapeutic exercises to develop  ROM, flexibility, posture, and core stabilization for 25 minutes including: see chart      Type Intervention 11/10/22 11/15/22 11/17/22 11/22/22 11/28/22   Manual STM to pelvic  1,2,3 x x x x    Manual  Introital stretch x x x x    Manual  Lateral/posterior stretching to entirety of pelvic floor mm x x x x    Manual Sustained holds to puborectalis, coccygeus    x    Therac Educated on self-introital stretch w/ or w/o dilators x       Therac Reviewed HEP, POC progression x x   x   Manual  MFR/skin rolling/cupping to belly     x   Manual  Bowel massage     x   Therex Half foam roll posture series     x   Therex Open-books x20 B     x   Therex SNAGs      x   Therex Self suboccipital release with ball     x       Home Exercises Provided and Patient Education Provided     Education provided:   - cont prior HEP  Discussed progression of plan of care with patient; educated pt in activity modification; reviewed HEP with pt. Pt demonstrated and verbalized understanding of all instruction and was provided with a handout of HEP (see Patient Instructions).    Written Home Exercises Provided: yes.  Exercises were reviewed and Sunshine was able to demonstrate them prior to the end of the session.  Sunshine demonstrated good  understanding of the education provided.     See EMR under Patient Instructions for exercises provided 11/10/2022.    Assessment     Sunshine tolerated treatment well and demonstrated understanding of all education provided at today's visit. Today's treatment was focused on spinal mobility and tissue extensibility for proper mechanics and decreased pain. See below for goal progress.    Sunshine Is progressing well towards her goals.   Pt prognosis is Good.     Pt will continue to benefit from skilled outpatient physical therapy to address the deficits listed in the problem list box on initial evaluation, provide pt/family education and to maximize pt's level of independence in the home and community environment.     Pt's spiritual, cultural and educational needs considered and pt agreeable to plan of care and goals.     Anticipated barriers to physical therapy: scheduling    Short Term Goals: 6 weeks   Pt to demonstrate tolerance to gentle manual therapy to allow for pain-free gynecological exams. MET 11/28  Pt to demonstrate tolerance to dilator size 1 for 5 minutes to allow for tampon use.MET 11/28  Pt to demonstrate proper diaphragmatic breathing to help with calming the nervous system in order to decrease pain and to improve abdominal wall musculature extensibility. MET 11/28  Pt to report minimal pain with palpation of abdominal wall due to improvement in  soft tissue mobility from moderate to minimal restrictions. MET 11/28  Pt will report minimal to no pain with single digit pelvic assessment and display relaxation during this assessment in order to progress to dilator use. MET 11/28  Pt will report successful tampon use with < or = 2/10  pain for improved activity tolerance. NOT APPLICABLE - PT USES PADS 11/28     Long Term Goals: 16 weeks   Pt to demonstrate tolerance to dilator size 5 to allow for pain-free intercourse.  Pt to report decreased incidence of PF tension at rest (no more than 20% of the time) to demonstrate functional mechanics and motor patterns.  Pt to be discharged with home plan for carry over after discharge.   Pt will be trained and compliant with postural strategies in sitting and standing to improve alignment and decrease pain and muscle fatigue  Pt to report being able to have a comfortable vaginal exam without significant increase in pelvic pain.  Pt will be independent with use of dilation in order to progress towards self management and intercourse.  Pt will report successful tampon use with no pain reported to demonstrate a return towards prior level of function.     Plan     Continue per established POC.    Debby Parra, PT , DPT

## 2022-12-23 ENCOUNTER — TELEPHONE (OUTPATIENT)
Dept: PSYCHIATRY | Facility: CLINIC | Age: 22
End: 2022-12-23
Payer: COMMERCIAL
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
## 2023-01-13 ENCOUNTER — OFFICE VISIT (OUTPATIENT)
Dept: PSYCHIATRY | Facility: CLINIC | Age: 23
End: 2023-01-13
Payer: COMMERCIAL

## 2023-01-13 DIAGNOSIS — F41.0 GENERALIZED ANXIETY DISORDER WITH PANIC ATTACKS: ICD-10-CM

## 2023-01-13 DIAGNOSIS — F31.81 BIPOLAR 2 DISORDER: Primary | ICD-10-CM

## 2023-01-13 DIAGNOSIS — F41.1 GENERALIZED ANXIETY DISORDER WITH PANIC ATTACKS: ICD-10-CM

## 2023-01-13 PROCEDURE — 1159F MED LIST DOCD IN RCRD: CPT | Mod: CPTII,95,, | Performed by: PHYSICIAN ASSISTANT

## 2023-01-13 PROCEDURE — 99214 PR OFFICE/OUTPT VISIT, EST, LEVL IV, 30-39 MIN: ICD-10-PCS | Mod: 95,,, | Performed by: PHYSICIAN ASSISTANT

## 2023-01-13 PROCEDURE — 1159F PR MEDICATION LIST DOCUMENTED IN MEDICAL RECORD: ICD-10-PCS | Mod: CPTII,95,, | Performed by: PHYSICIAN ASSISTANT

## 2023-01-13 PROCEDURE — 1160F RVW MEDS BY RX/DR IN RCRD: CPT | Mod: CPTII,95,, | Performed by: PHYSICIAN ASSISTANT

## 2023-01-13 PROCEDURE — 99214 OFFICE O/P EST MOD 30 MIN: CPT | Mod: 95,,, | Performed by: PHYSICIAN ASSISTANT

## 2023-01-13 PROCEDURE — 1160F PR REVIEW ALL MEDS BY PRESCRIBER/CLIN PHARMACIST DOCUMENTED: ICD-10-PCS | Mod: CPTII,95,, | Performed by: PHYSICIAN ASSISTANT

## 2023-01-13 RX ORDER — AMITRIPTYLINE HYDROCHLORIDE 25 MG/1
37.5 TABLET, FILM COATED ORAL NIGHTLY
Qty: 90 TABLET | Refills: 0 | Status: SHIPPED | OUTPATIENT
Start: 2023-01-13 | End: 2023-02-15 | Stop reason: SDUPTHER

## 2023-01-13 RX ORDER — ZIPRASIDONE HYDROCHLORIDE 20 MG/1
20 CAPSULE ORAL 2 TIMES DAILY
Qty: 180 CAPSULE | Refills: 1 | Status: SHIPPED | OUTPATIENT
Start: 2023-01-13 | End: 2023-02-15 | Stop reason: SDUPTHER

## 2023-01-13 NOTE — PROGRESS NOTES
Outpatient Psychiatry Follow-Up Visit ([facility])    1/13/2023    Clinical Status of Patient:  Outpatient (Ambulatory)    The patient location is: Home in [place]  The chief complaint leading to consultation is: mood disorder    Visit type: audiovisual    Face to Face time with patient: 16  30 minutes of total time spent on the encounter, which includes face to face time and non-face to face time preparing to see the patient (eg, review of tests), Obtaining and/or reviewing separately obtained history, Documenting clinical information in the electronic or other health record, Independently interpreting results (not separately reported) and communicating results to the patient/family/caregiver, or Care coordination (not separately reported).     Each patient to whom he or she provides medical services by telemedicine is:  (1) informed of the relationship between the physician and patient and the respective role of any other health care provider with respect to management of the patient; and (2) notified that he or she may decline to receive medical services by telemedicine and may withdraw from such care at any time.        Chief Complaint:  Sunshine Lund is a 22 y.o. female who presents today for follow-up of mood disorder and anxiety.  Met with patient.      Interval History and Content of Current Session:  Interim Events/Subjective Report/Content of Current Session:   Patient returns for follow up of bipolar II and anxiety. She is currently taking Buspirone 20 mg BID, amitriptyline 25 mg qHS increased last visit, and Ziprasidone 20 mg AM and 40 mg PM.  Reports increase in amitriptyline helped significantly with depression and sleep, but still a little depressed at times and has trouble falling asleep occasionally.  Also has a more regular work schedule and is interested in going back up to TID with her buspirone.      Review of Systems   PSYCHIATRIC: Pertinant items are noted in the narrative.  CONSTITUTIONAL: No  weight gain or loss.   MUSCULOSKELETAL: No pain or stiffness of the joints.  NEUROLOGIC: No weakness, sensory changes, seizures, confusion, memory loss, tremor or other abnormal movements.    Past Medical, Family and Social History: The patient's past medical, family and social history have been reviewed and updated as appropriate within the electronic medical record - see encounter notes.    Compliance: yes    Side effects: None    Risk Parameters:  Patient reports no suicidal ideation  Patient reports no homicidal ideation  Patient reports no self-injurious behavior  Patient reports no violent behavior    Exam (detailed: at least 9 elements; comprehensive: all 15 elements)   Constitutional  Vitals:  Most recent vital signs, dated less than 90 days prior to this appointment, were reviewed.   There were no vitals filed for this visit.     General:  unremarkable, age appropriate, casually dressed     Musculoskeletal  Muscle Strength/Tone:  not examined   Gait & Station:  not examined     Psychiatric  Speech:  no latency; no press   Mood & Affect:  steady  congruent and appropriate   Thought Process:  normal and logical   Associations:  intact   Thought Content:  normal, no suicidality, no homicidality, delusions, or paranoia   Insight:  intact   Judgement: behavior is adequate to circumstances   Orientation:  grossly intact   Memory: intact for content of interview   Language: grossly intact   Attention Span & Concentration:  able to focus   Fund of Knowledge:  intact and appropriate to age and level of education     Assessment and Diagnosis   Status/Progress: Based on the examination today, the patient's problem(s) is/are adequately but not ideally controlled.  New problems have not been presented today.   Diagnostic uncertainty and Lack of compliance are not complicating management of the primary condition.  There are no active rule-out diagnoses for this patient at this time.     General Impression: Bipolar II and  AMANDA improved since increasing amitriptyline to 25 mg nightly.  Mood stable.  Anxiety fairly well under control.  Still a little depressed.      ICD-10-CM ICD-9-CM   1. Bipolar 2 disorder  F31.81 296.89   2. Generalized anxiety disorder with panic attacks  F41.1 300.02    F41.0 300.01           1. Increasing amitriptyline to 37.5 mg nightly.  I have explained the risks, benefits, and alternatives of medication treatment in detail. We specifically discussed risks and benefits of medication treatment, including but not limited to, headache, nausea, GI upset, insomnia, agitation and possible increased thoughts of self harm. These thought will likely resolve with regular follow up and treatment. Patient voices understanding and all questions have been answered. Patient agrees to treatment plan and medication trial.    2. Increase buspirone to 20 mg TID targeting anxiety.  3. Continue ziprasidone 40 mg qAM and 20 mg qPM.  4. Follow up in one month    Intervention/Counseling/Treatment Plan   Medication Management: Continue current medications. The risks and benefits of medication were discussed with the patient.      Return to Clinic: 1 month

## 2023-02-15 ENCOUNTER — OFFICE VISIT (OUTPATIENT)
Dept: PSYCHIATRY | Facility: CLINIC | Age: 23
End: 2023-02-15
Payer: COMMERCIAL

## 2023-02-15 DIAGNOSIS — F41.1 GENERALIZED ANXIETY DISORDER WITH PANIC ATTACKS: ICD-10-CM

## 2023-02-15 DIAGNOSIS — F41.0 GENERALIZED ANXIETY DISORDER WITH PANIC ATTACKS: ICD-10-CM

## 2023-02-15 DIAGNOSIS — F31.81 BIPOLAR 2 DISORDER: Primary | ICD-10-CM

## 2023-02-15 PROCEDURE — 1159F MED LIST DOCD IN RCRD: CPT | Mod: CPTII,95,, | Performed by: PHYSICIAN ASSISTANT

## 2023-02-15 PROCEDURE — 1160F PR REVIEW ALL MEDS BY PRESCRIBER/CLIN PHARMACIST DOCUMENTED: ICD-10-PCS | Mod: CPTII,95,, | Performed by: PHYSICIAN ASSISTANT

## 2023-02-15 PROCEDURE — 1160F RVW MEDS BY RX/DR IN RCRD: CPT | Mod: CPTII,95,, | Performed by: PHYSICIAN ASSISTANT

## 2023-02-15 PROCEDURE — 99214 PR OFFICE/OUTPT VISIT, EST, LEVL IV, 30-39 MIN: ICD-10-PCS | Mod: 95,,, | Performed by: PHYSICIAN ASSISTANT

## 2023-02-15 PROCEDURE — 99214 OFFICE O/P EST MOD 30 MIN: CPT | Mod: 95,,, | Performed by: PHYSICIAN ASSISTANT

## 2023-02-15 PROCEDURE — 1159F PR MEDICATION LIST DOCUMENTED IN MEDICAL RECORD: ICD-10-PCS | Mod: CPTII,95,, | Performed by: PHYSICIAN ASSISTANT

## 2023-02-15 RX ORDER — BUSPIRONE HYDROCHLORIDE 10 MG/1
20 TABLET ORAL 3 TIMES DAILY
Qty: 180 TABLET | Refills: 5 | Status: SHIPPED | OUTPATIENT
Start: 2023-02-15 | End: 2023-06-09 | Stop reason: SDUPTHER

## 2023-02-15 RX ORDER — ZIPRASIDONE HYDROCHLORIDE 20 MG/1
20 CAPSULE ORAL 2 TIMES DAILY
Qty: 180 CAPSULE | Refills: 1 | Status: SHIPPED | OUTPATIENT
Start: 2023-02-15 | End: 2023-04-26

## 2023-02-15 RX ORDER — AMITRIPTYLINE HYDROCHLORIDE 25 MG/1
37.5 TABLET, FILM COATED ORAL NIGHTLY
Qty: 135 TABLET | Refills: 1 | Status: SHIPPED | OUTPATIENT
Start: 2023-02-15 | End: 2023-05-09 | Stop reason: SDUPTHER

## 2023-02-15 RX ORDER — BUSPIRONE HYDROCHLORIDE 10 MG/1
20 TABLET ORAL 3 TIMES DAILY
Qty: 180 TABLET | Refills: 0 | Status: SHIPPED | OUTPATIENT
Start: 2023-02-15 | End: 2023-03-17

## 2023-02-15 RX ORDER — BUSPIRONE HYDROCHLORIDE 10 MG/1
20 TABLET ORAL 3 TIMES DAILY
Qty: 180 TABLET | Refills: 0 | Status: SHIPPED | OUTPATIENT
Start: 2023-02-15 | End: 2023-02-15

## 2023-02-15 NOTE — PROGRESS NOTES
Outpatient Psychiatry Follow-Up Visit (PA-C)    2/15/2023    Clinical Status of Patient:  Outpatient (Ambulatory)    The patient location is: Home in [place]  The chief complaint leading to consultation is: mood disorder    Visit type: audiovisual    Face to Face time with patient: 16  30 minutes of total time spent on the encounter, which includes face to face time and non-face to face time preparing to see the patient (eg, review of tests), Obtaining and/or reviewing separately obtained history, Documenting clinical information in the electronic or other health record, Independently interpreting results (not separately reported) and communicating results to the patient/family/caregiver, or Care coordination (not separately reported).     Each patient to whom he or she provides medical services by telemedicine is:  (1) informed of the relationship between the physician and patient and the respective role of any other health care provider with respect to management of the patient; and (2) notified that he or she may decline to receive medical services by telemedicine and may withdraw from such care at any time.        Chief Complaint:  Sunshine Lund is a 22 y.o. female who presents today for follow-up of mood disorder and anxiety.  Met with patient.      Interval History and Content of Current Session:  Interim Events/Subjective Report/Content of Current Session:   Patient returns for follow up of bipolar II and anxiety. She is currently taking Buspirone 20 mg TID, amitriptyline 37.5 mg qHS increased last visit, and Ziprasidone 20 mg AM and 40 mg PM.  Last visit we increased buspirone and amitriptyline.  Pt reports she is sleeping much better, anxiety is controlled, mood stable.  Reports her ziprasidone rx was $60 at "[facility] last month, so requests to switch medications.      Review of Systems   PSYCHIATRIC: Pertinant items are noted in the narrative.  CONSTITUTIONAL: No weight gain or loss.   MUSCULOSKELETAL:  No pain or stiffness of the joints.  NEUROLOGIC: No weakness, sensory changes, seizures, confusion, memory loss, tremor or other abnormal movements.    Past Medical, Family and Social History: The patient's past medical, family and social history have been reviewed and updated as appropriate within the electronic medical record - see encounter notes.    Compliance: yes    Side effects: None    Risk Parameters:  Patient reports no suicidal ideation  Patient reports no homicidal ideation  Patient reports no self-injurious behavior  Patient reports no violent behavior    Exam (detailed: at least 9 elements; comprehensive: all 15 elements)   Constitutional  Vitals:  Most recent vital signs, dated less than 90 days prior to this appointment, were reviewed.   There were no vitals filed for this visit.     General:  unremarkable, age appropriate, casually dressed     Musculoskeletal  Muscle Strength/Tone:  not examined   Gait & Station:  not examined     Psychiatric  Speech:  no latency; no press   Mood & Affect:  steady  congruent and appropriate   Thought Process:  normal and logical   Associations:  intact   Thought Content:  normal, no suicidality, no homicidality, delusions, or paranoia   Insight:  intact   Judgement: behavior is adequate to circumstances   Orientation:  grossly intact   Memory: intact for content of interview   Language: grossly intact   Attention Span & Concentration:  able to focus   Fund of Knowledge:  intact and appropriate to age and level of education     Assessment and Diagnosis   Status/Progress: Based on the examination today, the patient's problem(s) is/are adequately but not ideally controlled.  New problems have not been presented today.   Diagnostic uncertainty and Lack of compliance are not complicating management of the primary condition.  There are no active rule-out diagnoses for this patient at this time.     General Impression: Bipolar II and AMANDA now well controlled since  increasing amitriptyline to 37.5 mg nightly and buspirone to 20 mg TID.  Mood stable.  Anxiety well controlled. [facility] which will be a lower [facility]-pay.      ICD-10-CM ICD-9-CM   1. Bipolar 2 disorder  F31.81 296.89   2. Generalized anxiety disorder with panic attacks  F41.1 300.02    F41.0 300.01         1. Continue amitriptyline 37.5 mg nightly.  2. Continue buspirone 20 mg TID targeting anxiety.  3. Continue ziprasidone 40 mg qAM and 20 mg qPM.  4. Follow up in one month    Intervention/Counseling/Treatment Plan   Medication Management: Continue current medications. The risks and benefits of medication were discussed with the patient.      Return to Clinic: 1 month

## 2023-02-22 ENCOUNTER — TELEPHONE (OUTPATIENT)
Dept: PSYCHIATRY | Facility: CLINIC | Age: 23
End: 2023-02-22
Payer: COMMERCIAL

## 2023-02-22 NOTE — TELEPHONE ENCOUNTER
[facility] called for clarification on pt's ziprasidone directions. Returned call. Advised per Skylar last note- pt is to take 40MG (2 caps of 20MG) qAM, 20MG (1 cap of 20MG) qPM. Quantity 270 for 90days + 1 refill.    No concerns voiced.

## 2023-03-15 ENCOUNTER — OFFICE VISIT (OUTPATIENT)
Dept: PSYCHIATRY | Facility: CLINIC | Age: 23
End: 2023-03-15
Payer: COMMERCIAL

## 2023-03-15 DIAGNOSIS — F41.0 GENERALIZED ANXIETY DISORDER WITH PANIC ATTACKS: ICD-10-CM

## 2023-03-15 DIAGNOSIS — F31.81 BIPOLAR 2 DISORDER: Primary | ICD-10-CM

## 2023-03-15 DIAGNOSIS — F41.1 GENERALIZED ANXIETY DISORDER WITH PANIC ATTACKS: ICD-10-CM

## 2023-03-15 PROCEDURE — 99214 PR OFFICE/OUTPT VISIT, EST, LEVL IV, 30-39 MIN: ICD-10-PCS | Mod: 95,,, | Performed by: PHYSICIAN ASSISTANT

## 2023-03-15 PROCEDURE — 1159F PR MEDICATION LIST DOCUMENTED IN MEDICAL RECORD: ICD-10-PCS | Mod: CPTII,95,, | Performed by: PHYSICIAN ASSISTANT

## 2023-03-15 PROCEDURE — 1159F MED LIST DOCD IN RCRD: CPT | Mod: CPTII,95,, | Performed by: PHYSICIAN ASSISTANT

## 2023-03-15 PROCEDURE — 1160F RVW MEDS BY RX/DR IN RCRD: CPT | Mod: CPTII,95,, | Performed by: PHYSICIAN ASSISTANT

## 2023-03-15 PROCEDURE — 1160F PR REVIEW ALL MEDS BY PRESCRIBER/CLIN PHARMACIST DOCUMENTED: ICD-10-PCS | Mod: CPTII,95,, | Performed by: PHYSICIAN ASSISTANT

## 2023-03-15 PROCEDURE — 99214 OFFICE O/P EST MOD 30 MIN: CPT | Mod: 95,,, | Performed by: PHYSICIAN ASSISTANT

## 2023-03-29 ENCOUNTER — OFFICE VISIT (OUTPATIENT)
Dept: PSYCHIATRY | Facility: CLINIC | Age: 23
End: 2023-03-29
Payer: COMMERCIAL

## 2023-03-29 DIAGNOSIS — F31.81 BIPOLAR 2 DISORDER: Primary | ICD-10-CM

## 2023-03-29 DIAGNOSIS — F41.0 GENERALIZED ANXIETY DISORDER WITH PANIC ATTACKS: ICD-10-CM

## 2023-03-29 DIAGNOSIS — F41.1 GENERALIZED ANXIETY DISORDER WITH PANIC ATTACKS: ICD-10-CM

## 2023-03-29 PROCEDURE — 1159F PR MEDICATION LIST DOCUMENTED IN MEDICAL RECORD: ICD-10-PCS | Mod: CPTII,95,, | Performed by: PHYSICIAN ASSISTANT

## 2023-03-29 PROCEDURE — 1159F MED LIST DOCD IN RCRD: CPT | Mod: CPTII,95,, | Performed by: PHYSICIAN ASSISTANT

## 2023-03-29 PROCEDURE — 1160F PR REVIEW ALL MEDS BY PRESCRIBER/CLIN PHARMACIST DOCUMENTED: ICD-10-PCS | Mod: CPTII,95,, | Performed by: PHYSICIAN ASSISTANT

## 2023-03-29 PROCEDURE — 1160F RVW MEDS BY RX/DR IN RCRD: CPT | Mod: CPTII,95,, | Performed by: PHYSICIAN ASSISTANT

## 2023-03-29 PROCEDURE — 99214 OFFICE O/P EST MOD 30 MIN: CPT | Mod: 95,,, | Performed by: PHYSICIAN ASSISTANT

## 2023-03-29 PROCEDURE — 99214 PR OFFICE/OUTPT VISIT, EST, LEVL IV, 30-39 MIN: ICD-10-PCS | Mod: 95,,, | Performed by: PHYSICIAN ASSISTANT

## 2023-03-29 RX ORDER — LAMOTRIGINE 25 MG/1
25 TABLET ORAL DAILY
Qty: 90 TABLET | Refills: 0 | Status: SHIPPED | OUTPATIENT
Start: 2023-03-29 | End: 2023-05-04 | Stop reason: SDUPTHER

## 2023-03-29 NOTE — PROGRESS NOTES
Outpatient Psychiatry Follow-Up Visit (PA-MARIE)    3/29/2023    Clinical Status of Patient:  Outpatient (Ambulatory)    The patient location is: In her car outside her work at [address] [place] [address]  The chief complaint leading to consultation is: mood disorder    Visit type: audiovisual    Face to Face time with patient: 11  25 minutes of total time spent on the encounter, which includes face to face time and non-face to face time preparing to see the patient (eg, review of tests), Obtaining and/or reviewing separately obtained history, Documenting clinical information in the electronic or other health record, Independently interpreting results (not separately reported) and communicating results to the patient/family/caregiver, or Care coordination (not separately reported).     Each patient to whom he or she provides medical services by telemedicine is:  (1) informed of the relationship between the physician and patient and the respective role of any other health care provider with respect to management of the patient; and (2) notified that he or she may decline to receive medical services by telemedicine and may withdraw from such care at any time.        Chief Complaint:  Sunshine Lund is a 22 y.o. female who presents today for follow-up of mood disorder and anxiety.  Met with patient.      Interval History and Content of Current Session:  Interim Events/Subjective Report/Content of Current Session:   Patient returns for follow up of bipolar II and anxiety. She is currently taking Buspirone 20 mg TID, amitriptyline 37.5 mg qHS, and ziprasidone 20 mg BID, decreased last visit due to akathisia.  Pt reports that her jitteriness and squirmy feeling have decreased but not resolved.  Still feeling somewhat depressed and very fatigued despite getting adequate sleep.    Review of Systems   PSYCHIATRIC: Pertinant items are noted in the narrative.  CONSTITUTIONAL: No weight gain or loss.   MUSCULOSKELETAL: No pain  or stiffness of the joints.  NEUROLOGIC: No weakness, sensory changes, seizures, confusion, memory loss, tremor or other abnormal movements.    Past Medical, Family and Social History: The patient's past medical, family and social history have been reviewed and updated as appropriate within the electronic medical record - see encounter notes.    Compliance: yes    Side effects: None    Risk Parameters:  Patient reports no suicidal ideation  Patient reports no homicidal ideation  Patient reports no self-injurious behavior  Patient reports no violent behavior    Exam (detailed: at least 9 elements; comprehensive: all 15 elements)   Constitutional  Vitals:  Most recent vital signs, dated less than 90 days prior to this appointment, were reviewed.   There were no vitals filed for this visit.     General:  unremarkable, age appropriate, casually dressed     Musculoskeletal  Muscle Strength/Tone:  not examined   Gait & Station:  not examined     Psychiatric  Speech:  no latency; no press   Mood & Affect:  dysthymic  congruent and appropriate   Thought Process:  normal and logical   Associations:  intact   Thought Content:  normal, no suicidality, no homicidality, delusions, or paranoia   Insight:  intact   Judgement: behavior is adequate to circumstances   Orientation:  grossly intact   Memory: intact for content of interview   Language: grossly intact   Attention Span & Concentration:  able to focus   Fund of Knowledge:  intact and appropriate to age and level of education     Assessment and Diagnosis   Status/Progress: Based on the examination today, the patient's problem(s) is/are adequately but not ideally controlled.  New problems have been presented today.   Diagnostic uncertainty and Lack of compliance are not complicating management of the primary condition.  There are no active rule-out diagnoses for this patient at this time.     General Impression: Bipolar II and AMANDA, anxiety well controlled.  Still with some  akathisia from ziprasidone and still feeling a little depressed and sedated.  Will switch ziprasidone to lamotrigine.      ICD-10-CM ICD-9-CM   1. Bipolar 2 disorder  F31.81 296.89   2. Generalized anxiety disorder with panic attacks  F41.1 300.02    F41.0 300.01       1. Decrease ziprasidone to 20 mg qHS for 1 week, then discontinue.  2. Start lamotrigine up-taper- Week 1 - One tablet (25mg) by mouth daily; Week 2 - 50mg daily; Week 3 - 25mg in the AM and 50mg at PM; Week 4 - 50mg by mouth twice daily.  Risks/bebefits/side effects discussed, including SJS rash.  Pt expressed understanding.  3. Continue amitriptyline 37.5 mg nightly.  4. Continue buspirone 20 mg TID targeting anxiety.  5. Follow up in one month    Intervention/Counseling/Treatment Plan   Medication Management: Continue current medications. The risks and benefits of medication were discussed with the patient.      Return to Clinic: 1 month    MEDICAL DECISION MAKING ELEMENTS:    NUMBER AND COMPLEXITY OF PROBLEMS ADDRESSED AT THE ENCOUNTER:   [] N/A    [] One self-limited or minor problem    [] Two or more self-limited or minor problems    [] One stable, chronic illness    [] One acute, uncomplicated illness or injury    [] One stable, acute illness    [] One acute, uncomplicated illness or injury requiring hospital inpatient or observational level of care   [x] One or more chronic illnesses with exacerbation, progression, or side effects of treatment    [] Two or more stable, chronic illnesses    [] One undiagnosed new problem with uncertain prognosis    [] One acute illness with system symptoms    [] One acute, complicated injury    [] One or more chronic illnesses with severe exacerbation, progression, or side effects of treatment    [] One acute or chronic illness or injury that poses a threat to life or bodily function    AMOUNT AND/OR COMPLEXITY OF DATA TO BE REVIEWED AND ANALYZED:   [] N/A    [x] Review of a prior external note(s)    [] Review  of a test result(s)    [] Ordering of a test(s)    [] Assessment requiring an independent historian(s)    [] Independent interpretation of a test(s)    [] Discussion of management or test interpretation with external professional source    RISK OF COMPLICATIONS AND/OR MORBIDITY OR MORALITY OF PATIENT MANAGEMENT:   [] N/A    [] Minimal    [] Low    [x] Moderate    [x] High  [x] Prescription drug management    [] Diagnosis or treatment significantly limited by social determinants of health    [] Drug therapy requiring intensive monitoring for toxicity    [] Decision regarding hospitalization or escalation of hospital-level care    [] Decision to de-escalate care because of poor prognosis    [] Parenteral controlled substances    LEVEL OF MEDICAL DECISION MAKING:   [] N/A    [] Straightforward    [] Low    [x] Moderate    [] High    PATIENT'S ABILITY AND CAPACITY TO RESPOND TO TREATMENT  [] N/A    [] Absent    [] Minimal    [x] Adequate    [] Robust    [] Unable to be determined    TOTAL TIME FOR SERVICES PERFORMED ON THE DATE OF THE ENCOUNTER:   [x] N/A    []  minutes    CPT CODE JUSTIFICATION:   [] N/A    [x] Level of MDM    [] Time    [] Meets criteria for both Level of MDM and Time

## 2023-04-26 ENCOUNTER — OFFICE VISIT (OUTPATIENT)
Dept: URGENT CARE | Facility: CLINIC | Age: 23
End: 2023-04-26
Payer: COMMERCIAL

## 2023-04-26 VITALS
BODY MASS INDEX: 18.51 KG/M2 | WEIGHT: 125 LBS | OXYGEN SATURATION: 98 % | RESPIRATION RATE: 16 BRPM | DIASTOLIC BLOOD PRESSURE: 87 MMHG | HEIGHT: 69 IN | SYSTOLIC BLOOD PRESSURE: 124 MMHG | HEART RATE: 121 BPM | TEMPERATURE: 99 F

## 2023-04-26 DIAGNOSIS — R30.0 DYSURIA: Primary | ICD-10-CM

## 2023-04-26 DIAGNOSIS — R31.9 URINARY TRACT INFECTION WITH HEMATURIA, SITE UNSPECIFIED: ICD-10-CM

## 2023-04-26 DIAGNOSIS — N39.0 URINARY TRACT INFECTION WITH HEMATURIA, SITE UNSPECIFIED: ICD-10-CM

## 2023-04-26 LAB
B-HCG UR QL: NEGATIVE
BILIRUB UR QL STRIP: NEGATIVE
CTP QC/QA: YES
GLUCOSE UR QL STRIP: NEGATIVE
KETONES UR QL STRIP: NEGATIVE
LEUKOCYTE ESTERASE UR QL STRIP: POSITIVE
PH, POC UA: 5.5 (ref 5–8)
POC BLOOD, URINE: POSITIVE
POC NITRATES, URINE: NEGATIVE
PROT UR QL STRIP: POSITIVE
SP GR UR STRIP: 1.02 (ref 1–1.03)
UROBILINOGEN UR STRIP-ACNC: NORMAL (ref 0.1–1.1)

## 2023-04-26 PROCEDURE — 81003 URINALYSIS AUTO W/O SCOPE: CPT | Mod: QW,S$GLB,, | Performed by: FAMILY MEDICINE

## 2023-04-26 PROCEDURE — 81003 POCT URINALYSIS, DIPSTICK, AUTOMATED, W/O SCOPE: ICD-10-PCS | Mod: QW,S$GLB,, | Performed by: FAMILY MEDICINE

## 2023-04-26 PROCEDURE — 99214 OFFICE O/P EST MOD 30 MIN: CPT | Mod: S$GLB,,, | Performed by: FAMILY MEDICINE

## 2023-04-26 PROCEDURE — 99214 PR OFFICE/OUTPT VISIT, EST, LEVL IV, 30-39 MIN: ICD-10-PCS | Mod: S$GLB,,, | Performed by: FAMILY MEDICINE

## 2023-04-26 PROCEDURE — 81025 URINE PREGNANCY TEST: CPT | Mod: S$GLB,,, | Performed by: FAMILY MEDICINE

## 2023-04-26 PROCEDURE — 81025 POCT URINE PREGNANCY: ICD-10-PCS | Mod: S$GLB,,, | Performed by: FAMILY MEDICINE

## 2023-04-26 RX ORDER — PHENAZOPYRIDINE HYDROCHLORIDE 200 MG/1
200 TABLET, FILM COATED ORAL 3 TIMES DAILY PRN
Qty: 15 TABLET | Refills: 0 | Status: SHIPPED | OUTPATIENT
Start: 2023-04-26 | End: 2023-05-06

## 2023-04-26 RX ORDER — CEPHALEXIN 500 MG/1
500 CAPSULE ORAL EVERY 8 HOURS
Qty: 30 CAPSULE | Refills: 0 | Status: SHIPPED | OUTPATIENT
Start: 2023-04-26 | End: 2023-05-09

## 2023-04-26 RX ORDER — METRONIDAZOLE 10 MG/G
1 GEL TOPICAL
COMMUNITY
Start: 2022-11-01 | End: 2023-04-26

## 2023-04-26 NOTE — PROGRESS NOTES
"Subjective:      Patient ID: Sunshine Lund is a 22 y.o. female.    Vitals:  height is 5' 9" (1.753 m) and weight is 56.7 kg (125 lb). Her oral temperature is 98.9 °F (37.2 °C). Her blood pressure is 124/87 and her pulse is 121 (abnormal). Her respiration is 16 and oxygen saturation is 98%.     Chief Complaint: Dysuria    Dysuria   This is a new problem. The current episode started 1 to 4 weeks ago (2 weeks ago). The problem has been gradually worsening. The quality of the pain is described as burning. The pain is at a severity of 8/10. The pain is severe. There has been no fever. There is No history of pyelonephritis. Associated symptoms include frequency, hematuria, urgency and vomiting (this morning around 4:30 am). Pertinent negatives include no behavior changes, chills, discharge, flank pain, hesitancy, nausea, possible pregnancy, sweats, weight loss, bubble bath use, constipation, rash or withholding. Associated symptoms comments: Positive for: sweats. Treatments tried: ibuprofen. There is no history of catheterization, diabetes insipidus, diabetes mellitus, genitourinary reflux, hypertension, kidney stones, recurrent UTIs, a single kidney, STD, urinary stasis or a urological procedure.     Constitution: Negative for chills.   Gastrointestinal:  Positive for vomiting (this morning around 4:30 am). Negative for nausea and constipation.   Genitourinary:  Positive for dysuria, frequency, urgency and hematuria. Negative for flank pain.   Skin:  Negative for rash and erythema.    Objective:     Physical Exam   Constitutional: No distress. normal  HENT:   Head: Normocephalic and atraumatic.   Ears:   Right Ear: impacted cerumen  Left Ear: impacted cerumen  Nose: No rhinorrhea or congestion.   Mouth/Throat: No oropharyngeal exudate or posterior oropharyngeal erythema.   Eyes: Conjunctivae are normal. Pupils are equal, round, and reactive to light. Extraocular movement intact   Neck: Neck supple.   Pulmonary/Chest: " Effort normal and breath sounds normal.   Abdominal: Normal appearance and bowel sounds are normal. Soft. flat abdomen   Musculoskeletal: Normal range of motion.         General: No swelling or tenderness. Normal range of motion.   Neurological: no focal deficit. She is alert and at baseline. She is disoriented. No cranial nerve deficit or sensory deficit.   Skin: Skin is warm and dry. Capillary refill takes less than 2 seconds. No erythema   Psychiatric: Her behavior is normal. Mood, judgment and thought content normal.   Nursing note and vitals reviewed.    Assessment:Plan:   1. Dysuria  - POCT Urinalysis, Dipstick, Automated, W/O Scope  - POCT urine pregnancy  - phenazopyridine (PYRIDIUM) 200 MG tablet; Take 1 tablet (200 mg total) by mouth 3 (three) times daily as needed.  Dispense: 15 tablet; Refill: 0    2. Urinary tract infection with hematuria, site unspecified  - cephALEXin (KEFLEX) 500 MG capsule; Take 1 capsule (500 mg total) by mouth every 8 (eight) hours.  Dispense: 30 capsule; Refill: 0   All results discussed with pt prior to discharge from clinic

## 2023-05-04 RX ORDER — LAMOTRIGINE 25 MG/1
25 TABLET ORAL DAILY
Qty: 90 TABLET | Refills: 0 | Status: SHIPPED | OUTPATIENT
Start: 2023-05-04 | End: 2023-05-09 | Stop reason: SDUPTHER

## 2023-05-05 ENCOUNTER — TELEPHONE (OUTPATIENT)
Dept: PSYCHIATRY | Facility: CLINIC | Age: 23
End: 2023-05-05
Payer: COMMERCIAL

## 2023-05-09 ENCOUNTER — OFFICE VISIT (OUTPATIENT)
Dept: PSYCHIATRY | Facility: CLINIC | Age: 23
End: 2023-05-09
Payer: COMMERCIAL

## 2023-05-09 DIAGNOSIS — F41.0 GENERALIZED ANXIETY DISORDER WITH PANIC ATTACKS: Primary | ICD-10-CM

## 2023-05-09 DIAGNOSIS — F31.81 BIPOLAR 2 DISORDER: ICD-10-CM

## 2023-05-09 DIAGNOSIS — F41.1 GENERALIZED ANXIETY DISORDER WITH PANIC ATTACKS: Primary | ICD-10-CM

## 2023-05-09 PROCEDURE — 1160F RVW MEDS BY RX/DR IN RCRD: CPT | Mod: CPTII,95,, | Performed by: PHYSICIAN ASSISTANT

## 2023-05-09 PROCEDURE — 1159F MED LIST DOCD IN RCRD: CPT | Mod: CPTII,95,, | Performed by: PHYSICIAN ASSISTANT

## 2023-05-09 PROCEDURE — 1160F PR REVIEW ALL MEDS BY PRESCRIBER/CLIN PHARMACIST DOCUMENTED: ICD-10-PCS | Mod: CPTII,95,, | Performed by: PHYSICIAN ASSISTANT

## 2023-05-09 PROCEDURE — 99214 PR OFFICE/OUTPT VISIT, EST, LEVL IV, 30-39 MIN: ICD-10-PCS | Mod: 95,,, | Performed by: PHYSICIAN ASSISTANT

## 2023-05-09 PROCEDURE — 1159F PR MEDICATION LIST DOCUMENTED IN MEDICAL RECORD: ICD-10-PCS | Mod: CPTII,95,, | Performed by: PHYSICIAN ASSISTANT

## 2023-05-09 PROCEDURE — 99214 OFFICE O/P EST MOD 30 MIN: CPT | Mod: 95,,, | Performed by: PHYSICIAN ASSISTANT

## 2023-05-09 RX ORDER — AMITRIPTYLINE HYDROCHLORIDE 50 MG/1
50 TABLET, FILM COATED ORAL NIGHTLY
Qty: 90 TABLET | Refills: 1 | Status: SHIPPED | OUTPATIENT
Start: 2023-05-09 | End: 2023-11-09 | Stop reason: SDUPTHER

## 2023-05-09 RX ORDER — LAMOTRIGINE 200 MG/1
200 TABLET ORAL DAILY
Qty: 90 TABLET | Refills: 1 | Status: SHIPPED | OUTPATIENT
Start: 2023-05-09 | End: 2023-07-07 | Stop reason: SDUPTHER

## 2023-05-09 NOTE — PROGRESS NOTES
Outpatient Psychiatry Follow-Up Visit (PA-MARIE)    5/9/2023    Clinical Status of Patient:  Outpatient (Ambulatory)    The patient location is:  Home at address on record.  The chief complaint leading to consultation is: mood disorder    Visit type: audiovisual    Face to Face time with patient: 15  25 minutes of total time spent on the encounter, which includes face to face time and non-face to face time preparing to see the patient (eg, review of tests), Obtaining and/or reviewing separately obtained history, Documenting clinical information in the electronic or other health record, Independently interpreting results (not separately reported) and communicating results to the patient/family/caregiver, or Care coordination (not separately reported).     Each patient to whom he or she provides medical services by telemedicine is:  (1) informed of the relationship between the physician and patient and the respective role of any other health care provider with respect to management of the patient; and (2) notified that he or she may decline to receive medical services by telemedicine and may withdraw from such care at any time.        Chief Complaint:  Sunshine Lund is a 22 y.o. female who presents today for follow-up of mood disorder and anxiety.  Met with patient.      Interval History and Content of Current Session:  Interim Events/Subjective Report/Content of Current Session:   Patient returns for follow up of bipolar II and anxiety. She is currently taking amitriptyline 50 mg at bedtime (self increased from 37.5 mg), buspirone 10 mg t.i.d. (self decreased from 20 mg t.i.d.), and lamotrigine 50 mg b.i.d..  Last visit we tapered down off of the ziprasidone and started the lamotrigine up taper.  Patient reports her mood is better, more stable.  Feels like she is 75-80% on her mood.  She reports the transition from ziprasidone to lamotrigine was rough, and was waking up upwards of 4 times per night at first, but this is  getting better.  She also had a few panic attacks around the time of the transition.    Patient's grandpa  couple of weeks ago and patient got couple of days off work to attend the , which she states was somewhat therapeutic because she got a break while she transitioned her medications.    Also reports she started school, is taking applied mathematics and information fluency and is getting 100% on all her assignments.    Review of Systems   PSYCHIATRIC: Pertinant items are noted in the narrative.  CONSTITUTIONAL: No weight gain or loss.   MUSCULOSKELETAL: No pain or stiffness of the joints.  NEUROLOGIC: No weakness, sensory changes, seizures, confusion, memory loss, tremor or other abnormal movements.    Past Medical, Family and Social History: The patient's past medical, family and social history have been reviewed and updated as appropriate within the electronic medical record - see encounter notes.    Compliance: yes    Side effects: None    Risk Parameters:  Patient reports no suicidal ideation  Patient reports no homicidal ideation  Patient reports no self-injurious behavior  Patient reports no violent behavior    Exam (detailed: at least 9 elements; comprehensive: all 15 elements)   Constitutional  Vitals:  Most recent vital signs, dated less than 90 days prior to this appointment, were reviewed.   There were no vitals filed for this visit.     General:  unremarkable, age appropriate, casually dressed     Musculoskeletal  Muscle Strength/Tone:  not examined   Gait & Station:  not examined     Psychiatric  Speech:  no latency; no press   Mood & Affect:  steady, happy  congruent and appropriate   Thought Process:  normal and logical   Associations:  intact   Thought Content:  normal, no suicidality, no homicidality, delusions, or paranoia   Insight:  intact   Judgement: behavior is adequate to circumstances   Orientation:  grossly intact   Memory: intact for content of interview   Language: grossly  intact   Attention Span & Concentration:  able to focus   Fund of Knowledge:  intact and appropriate to age and level of education     Assessment and Diagnosis   Status/Progress: Based on the examination today, the patient's problem(s) is/are adequately but not ideally controlled.  New problems have been presented today.   Diagnostic uncertainty and Lack of compliance are not complicating management of the primary condition.  There are no active rule-out diagnoses for this patient at this time.     General Impression: Bipolar II and AMANDA, anxiety well controlled.  Akathisia resolved since stopping ziprasidone.  Mood stability improved with lamotrigine.  Denies side effects, including rash.  Will continue lamotrigine ramp up and increased to 200 mg daily targeting mood stability.      ICD-10-CM ICD-9-CM   1. Generalized anxiety disorder with panic attacks  F41.1 300.02    F41.0 300.01   2. Bipolar 2 disorder  F31.81 296.89       1. Increase lamotrigine to 200 mg daily in a single dose.  Risks/bebefits/side effects discussed.  Tolerating well so far.  2. Continue amitriptyline 50 mg q.h.s.  3. Continue buspirone 10 mg t.i.d. for now, may consider tapering off of this in the future if it is no longer needed.    4. Follow up in one month    Intervention/Counseling/Treatment Plan   Medication Management: Continue current medications. The risks and benefits of medication were discussed with the patient.      Return to Clinic: 1 month    MEDICAL DECISION MAKING ELEMENTS:    NUMBER AND COMPLEXITY OF PROBLEMS ADDRESSED AT THE ENCOUNTER:   [] N/A    [] One self-limited or minor problem    [] Two or more self-limited or minor problems    [] One stable, chronic illness    [] One acute, uncomplicated illness or injury    [] One stable, acute illness    [] One acute, uncomplicated illness or injury requiring hospital inpatient or observational level of care   [x] One or more chronic illnesses with exacerbation, progression, or  side effects of treatment    [] Two or more stable, chronic illnesses    [] One undiagnosed new problem with uncertain prognosis    [] One acute illness with system symptoms    [] One acute, complicated injury    [] One or more chronic illnesses with severe exacerbation, progression, or side effects of treatment    [] One acute or chronic illness or injury that poses a threat to life or bodily function    AMOUNT AND/OR COMPLEXITY OF DATA TO BE REVIEWED AND ANALYZED:   [] N/A    [x] Review of a prior external note(s)    [] Review of a test result(s)    [] Ordering of a test(s)    [] Assessment requiring an independent historian(s)    [] Independent interpretation of a test(s)    [] Discussion of management or test interpretation with external professional source    RISK OF COMPLICATIONS AND/OR MORBIDITY OR MORALITY OF PATIENT MANAGEMENT:   [] N/A    [] Minimal    [] Low    [x] Moderate    [] High  [x] Prescription drug management    [] Diagnosis or treatment significantly limited by social determinants of health    [] Drug therapy requiring intensive monitoring for toxicity    [] Decision regarding hospitalization or escalation of hospital-level care    [] Decision to de-escalate care because of poor prognosis    [] Parenteral controlled substances    LEVEL OF MEDICAL DECISION MAKING:   [] N/A    [] Straightforward    [] Low    [x] Moderate    [] High    PATIENT'S ABILITY AND CAPACITY TO RESPOND TO TREATMENT  [] N/A    [] Absent    [] Minimal    [x] Adequate    [] Robust    [] Unable to be determined    TOTAL TIME FOR SERVICES PERFORMED ON THE DATE OF THE ENCOUNTER:   [x] N/A    []  minutes    CPT CODE JUSTIFICATION:   [] N/A    [x] Level of MDM    [] Time    [] Meets criteria for both Level of MDM and Time

## 2023-06-02 ENCOUNTER — OFFICE VISIT (OUTPATIENT)
Dept: INTERNAL MEDICINE | Facility: CLINIC | Age: 23
End: 2023-06-02
Payer: COMMERCIAL

## 2023-06-02 VITALS
BODY MASS INDEX: 18.62 KG/M2 | HEIGHT: 69 IN | DIASTOLIC BLOOD PRESSURE: 72 MMHG | SYSTOLIC BLOOD PRESSURE: 118 MMHG | HEART RATE: 85 BPM | OXYGEN SATURATION: 100 % | WEIGHT: 125.69 LBS

## 2023-06-02 DIAGNOSIS — J34.89 RHINORRHEA: ICD-10-CM

## 2023-06-02 DIAGNOSIS — R09.81 SINUS CONGESTION: Primary | ICD-10-CM

## 2023-06-02 DIAGNOSIS — R05.1 ACUTE COUGH: ICD-10-CM

## 2023-06-02 PROCEDURE — 99214 OFFICE O/P EST MOD 30 MIN: CPT | Mod: S$GLB,,, | Performed by: INTERNAL MEDICINE

## 2023-06-02 PROCEDURE — 3078F PR MOST RECENT DIASTOLIC BLOOD PRESSURE < 80 MM HG: ICD-10-PCS | Mod: CPTII,S$GLB,, | Performed by: INTERNAL MEDICINE

## 2023-06-02 PROCEDURE — 3008F BODY MASS INDEX DOCD: CPT | Mod: CPTII,S$GLB,, | Performed by: INTERNAL MEDICINE

## 2023-06-02 PROCEDURE — 3074F SYST BP LT 130 MM HG: CPT | Mod: CPTII,S$GLB,, | Performed by: INTERNAL MEDICINE

## 2023-06-02 PROCEDURE — 99214 PR OFFICE/OUTPT VISIT, EST, LEVL IV, 30-39 MIN: ICD-10-PCS | Mod: S$GLB,,, | Performed by: INTERNAL MEDICINE

## 2023-06-02 PROCEDURE — 99999 PR PBB SHADOW E&M-EST. PATIENT-LVL III: CPT | Mod: PBBFAC,,, | Performed by: INTERNAL MEDICINE

## 2023-06-02 PROCEDURE — 3074F PR MOST RECENT SYSTOLIC BLOOD PRESSURE < 130 MM HG: ICD-10-PCS | Mod: CPTII,S$GLB,, | Performed by: INTERNAL MEDICINE

## 2023-06-02 PROCEDURE — 99999 PR PBB SHADOW E&M-EST. PATIENT-LVL III: ICD-10-PCS | Mod: PBBFAC,,, | Performed by: INTERNAL MEDICINE

## 2023-06-02 PROCEDURE — 3008F PR BODY MASS INDEX (BMI) DOCUMENTED: ICD-10-PCS | Mod: CPTII,S$GLB,, | Performed by: INTERNAL MEDICINE

## 2023-06-02 PROCEDURE — 1159F MED LIST DOCD IN RCRD: CPT | Mod: CPTII,S$GLB,, | Performed by: INTERNAL MEDICINE

## 2023-06-02 PROCEDURE — 3078F DIAST BP <80 MM HG: CPT | Mod: CPTII,S$GLB,, | Performed by: INTERNAL MEDICINE

## 2023-06-02 PROCEDURE — 1159F PR MEDICATION LIST DOCUMENTED IN MEDICAL RECORD: ICD-10-PCS | Mod: CPTII,S$GLB,, | Performed by: INTERNAL MEDICINE

## 2023-06-02 NOTE — PATIENT INSTRUCTIONS
Resume taking mucinex twice a day  Continue nasal spray    Stop vaping for the next week.     Return to clinic in 5 months

## 2023-06-02 NOTE — PROGRESS NOTES
Subjective:       Patient ID: Sunshine Lund is a 22 y.o. female.    Chief Complaint: Sinus Problem      HPI  Sunshine Lund is a 22 y.o. year old female    Sinus problem - Mucinex for a few days, using [facility] allergy / sinus nasal spray    No fever, no chills  Minimally productive cough  Symptoms worse upon awakening in the morning    Mild nausea, spent 5 minutes gagging this morning.     Review of Systems   Constitutional:  Negative for activity change, appetite change, fatigue, fever and unexpected weight change.   HENT:  Negative for congestion, hearing loss, postnasal drip, sneezing, sore throat, trouble swallowing and voice change.    Eyes:  Negative for pain and discharge.   Respiratory:  Negative for cough, choking, chest tightness, shortness of breath and wheezing.    Cardiovascular:  Negative for chest pain, palpitations and leg swelling.   Gastrointestinal:  Negative for abdominal distention, abdominal pain, blood in stool, constipation, diarrhea, nausea and vomiting.   Endocrine: Negative for polydipsia and polyuria.   Genitourinary:  Negative for difficulty urinating and flank pain.   Musculoskeletal:  Negative for arthralgias, back pain, joint swelling, myalgias and neck pain.   Skin:  Negative for rash.   Neurological:  Negative for dizziness, tremors, seizures, weakness, numbness and headaches.   Psychiatric/Behavioral:  Negative for agitation. The patient is not nervous/anxious.        No past medical history on file.     Prior to Admission medications    Medication Sig Start Date End Date Taking? Authorizing Provider   amitriptyline (ELAVIL) 50 MG tablet Take 1 tablet (50 mg total) by mouth every evening. 5/9/23 11/5/23 Yes Lissette Belcher PA-C   busPIRone (BUSPAR) 10 MG tablet Take 2 tablets (20 mg total) by mouth 3 (three) times daily. 2/15/23 8/14/23 Yes Lissette Belcher PA-C   lamoTRIgine (LAMICTAL) 200 MG tablet Take 1 tablet (200 mg total) by mouth once daily.  "5/9/23 11/5/23 [facility]   norgestimate-ethinyl estradiol (ESTARYLLA ORAL)  7/31/22  Yes Historical Provider        Past medical history, surgical history, and family medical history reviewed and updated as appropriate.    Medications and allergies reviewed.     Objective:          Vitals:    06/02/23 1545   BP: 118/72   BP Location: Right arm   Patient Position: Sitting   Pulse: 85   SpO2: 100%   Weight: 57 kg (125 lb 10.6 oz)   Height: 5' 9" (1.753 m)     Body mass index is 18.56 kg/m².  Physical Exam  Constitutional:       Appearance: She is well-developed.   HENT:      Head: Normocephalic and atraumatic.   Eyes:      Extraocular Movements: Extraocular movements intact.   Cardiovascular:      Rate and Rhythm: Normal rate and regular rhythm.      Heart sounds: Normal heart sounds.   Pulmonary:      Effort: Pulmonary effort is normal. No respiratory distress.      Breath sounds: No wheezing, rhonchi or rales.      Comments: Coarse breath sounds  Abdominal:      General: Bowel sounds are normal. There is no distension.      Palpations: Abdomen is soft.      Tenderness: There is no abdominal tenderness.   Musculoskeletal:         General: No tenderness. Normal range of motion.      Cervical back: Normal range of motion.   Skin:     General: Skin is warm and dry.   Neurological:      Mental Status: She is alert and oriented to person, place, and time.      Cranial Nerves: No cranial nerve deficit.      Deep Tendon Reflexes: Reflexes are normal and symmetric.       Lab Results   Component Value Date    WBC 4.76 09/15/2022    HGB 12.2 09/15/2022    HCT 36.5 (L) 09/15/2022     (L) 09/15/2022    CHOL 214 (H) 09/15/2022    TRIG 69 09/15/2022    HDL 84 (H) 09/15/2022    ALT 8 (L) 09/15/2022    AST 12 09/15/2022     (L) 09/15/2022    K 4.2 09/15/2022     09/15/2022    CREATININE 0.9 09/15/2022    BUN 15 09/15/2022    CO2 22 (L) 09/15/2022    TSH 0.949 09/15/2022 "       Assessment:       1. Sinus congestion    2. Acute cough    3. Rhinorrhea          Plan:     Sunshine was seen today for sinus problem.    Diagnoses and all orders for this visit:    Sinus congestion  Comments:  no sore throat, sensations of post nasal drip in the morning    Acute cough  Comments:  minimal sputum production    Rhinorrhea      Health maintenance reviewed with patient.     Follow up in about 5 months (around 11/2/2023).    Brown Valdes MD  Internal Medicine / Primary Care  [facility]  6/2/2023

## 2023-06-09 ENCOUNTER — OFFICE VISIT (OUTPATIENT)
Dept: PSYCHIATRY | Facility: CLINIC | Age: 23
End: 2023-06-09
Payer: COMMERCIAL

## 2023-06-09 DIAGNOSIS — F41.1 GENERALIZED ANXIETY DISORDER WITH PANIC ATTACKS: Primary | ICD-10-CM

## 2023-06-09 DIAGNOSIS — F31.81 BIPOLAR 2 DISORDER: ICD-10-CM

## 2023-06-09 DIAGNOSIS — F41.0 GENERALIZED ANXIETY DISORDER WITH PANIC ATTACKS: Primary | ICD-10-CM

## 2023-06-09 PROCEDURE — 99214 PR OFFICE/OUTPT VISIT, EST, LEVL IV, 30-39 MIN: ICD-10-PCS | Mod: 95,,, | Performed by: PHYSICIAN ASSISTANT

## 2023-06-09 PROCEDURE — 1159F PR MEDICATION LIST DOCUMENTED IN MEDICAL RECORD: ICD-10-PCS | Mod: CPTII,95,, | Performed by: PHYSICIAN ASSISTANT

## 2023-06-09 PROCEDURE — 1159F MED LIST DOCD IN RCRD: CPT | Mod: CPTII,95,, | Performed by: PHYSICIAN ASSISTANT

## 2023-06-09 PROCEDURE — 99214 OFFICE O/P EST MOD 30 MIN: CPT | Mod: 95,,, | Performed by: PHYSICIAN ASSISTANT

## 2023-06-09 PROCEDURE — 1160F PR REVIEW ALL MEDS BY PRESCRIBER/CLIN PHARMACIST DOCUMENTED: ICD-10-PCS | Mod: CPTII,95,, | Performed by: PHYSICIAN ASSISTANT

## 2023-06-09 PROCEDURE — 1160F RVW MEDS BY RX/DR IN RCRD: CPT | Mod: CPTII,95,, | Performed by: PHYSICIAN ASSISTANT

## 2023-06-09 RX ORDER — PROPRANOLOL HYDROCHLORIDE 10 MG/1
10 TABLET ORAL 3 TIMES DAILY PRN
Qty: 90 TABLET | Refills: 0 | Status: SHIPPED | OUTPATIENT
Start: 2023-06-09 | End: 2023-07-13 | Stop reason: SDUPTHER

## 2023-06-09 RX ORDER — BUSPIRONE HYDROCHLORIDE 10 MG/1
20 TABLET ORAL 3 TIMES DAILY
Qty: 180 TABLET | Refills: 5 | Status: SHIPPED | OUTPATIENT
Start: 2023-06-09 | End: 2023-12-12 | Stop reason: SDUPTHER

## 2023-06-09 NOTE — PROGRESS NOTES
Outpatient Psychiatry Follow-Up Visit (PA-MARIE)    6/9/2023    Clinical Status of Patient:  Outpatient (Ambulatory)    The patient location is:  Home at address on record.  The chief complaint leading to consultation is: mood disorder    Visit type: audiovisual    Face to Face time with patient: 8  25 minutes of total time spent on the encounter, which includes face to face time and non-face to face time preparing to see the patient (eg, review of tests), Obtaining and/or reviewing separately obtained history, Documenting clinical information in the electronic or other health record, Independently interpreting results (not separately reported) and communicating results to the patient/family/caregiver, or Care coordination (not separately reported).     Each patient to whom he or she provides medical services by telemedicine is:  (1) informed of the relationship between the physician and patient and the respective role of any other health care provider with respect to management of the patient; and (2) notified that he or she may decline to receive medical services by telemedicine and may withdraw from such care at any time.        Chief Complaint:  Sunshine Lund is a 22 y.o. female who presents today for follow-up of mood disorder and anxiety.  Met with patient.      Interval History and Content of Current Session:  Interim Events/Subjective Report/Content of Current Session:   Patient returns for follow up of bipolar II and anxiety. She is currently taking amitriptyline 50 mg at bedtime, buspirone 10 mg t.i.d. and lamotrigine 200 mg daily, increased last visit.  Patient reports her mood is now stable, not feeling depressed or hypomanic.  Is feeling quite anxious, especially about her upcoming vacation with a friend who was quite high energy.  Reports her symptoms of anxiety include feeling her heart pounding and chest pressure.  There is a documented history of tachycardia in her chart and blood pressure has been  stable.    Review of Systems   PSYCHIATRIC: Pertinant items are noted in the narrative.  CONSTITUTIONAL: No weight gain or loss.   MUSCULOSKELETAL: No pain or stiffness of the joints.  NEUROLOGIC: No weakness, sensory changes, seizures, confusion, memory loss, tremor or other abnormal movements.    Past Medical, Family and Social History: The patient's past medical, family and social history have been reviewed and updated as appropriate within the electronic medical record - see encounter notes.    Compliance: yes    Side effects: None    Risk Parameters:  Patient reports no suicidal ideation  Patient reports no homicidal ideation  Patient reports no self-injurious behavior  Patient reports no violent behavior    Exam (detailed: at least 9 elements; comprehensive: all 15 elements)   Constitutional  Vitals:  Most recent vital signs, dated less than 90 days prior to this appointment, were reviewed.   There were no vitals filed for this visit.     General:  unremarkable, age appropriate, casually dressed     Musculoskeletal  Muscle Strength/Tone:  not examined   Gait & Station:  not examined     Psychiatric  Speech:  no latency; no press   Mood & Affect:  steady, euthymic  congruent and appropriate   Thought Process:  normal and logical   Associations:  intact   Thought Content:  normal, no suicidality, no homicidality, delusions, or paranoia   Insight:  intact   Judgement: behavior is adequate to circumstances   Orientation:  grossly intact   Memory: intact for content of interview   Language: grossly intact   Attention Span & Concentration:  able to focus   Fund of Knowledge:  intact and appropriate to age and level of education     Assessment and Diagnosis   Status/Progress: Based on the examination today, the patient's problem(s) is/are adequately but not ideally controlled.  New problems have been presented today.   Diagnostic uncertainty and Lack of compliance are not complicating management of the primary  condition.  There are no active rule-out diagnoses for this patient at this time.     General Impression: Bipolar II and AMANDA, mood stable with lamotrigine 200 mg daily, anxiety still problematic, especially in high stress situations.  Patient has already increased buspirone back up to her original dose of 20 mg t.i.d..  We will start propranolol for palpitations and chest pressure associated with anxiety.      ICD-10-CM ICD-9-CM   1. Generalized anxiety disorder with panic attacks  F41.1 300.02    F41.0 300.01   2. Bipolar 2 disorder  F31.81 296.89       1. Trial of propranolol 10 mg t.i.d. p.r.n. for anxiety.  Risks/bebefits/side effects discussed, including orthostatic hypotension and bradycardia.  No documented history of asthma  2. Continue lamotrigine 200 mg daily in a single dose.  Risks/bebefits/side effects discussed.  Tolerating well so far, no rash.  3. Continue amitriptyline 50 mg q.h.s.  4. Increase buspirone back up to 20 mg t.i.d. targeting anxiety.  5. Follow up in one month    Intervention/Counseling/Treatment Plan   Medication Management: Continue current medications. The risks and benefits of medication were discussed with the patient.      Return to Clinic: 1 month    MEDICAL DECISION MAKING ELEMENTS:    NUMBER AND COMPLEXITY OF PROBLEMS ADDRESSED AT THE ENCOUNTER:   [] N/A    [] One self-limited or minor problem    [] Two or more self-limited or minor problems    [] One stable, chronic illness    [] One acute, uncomplicated illness or injury    [] One stable, acute illness    [] One acute, uncomplicated illness or injury requiring hospital inpatient or observational level of care   [x] One or more chronic illnesses with exacerbation, progression, or side effects of treatment    [] Two or more stable, chronic illnesses    [] One undiagnosed new problem with uncertain prognosis    [] One acute illness with system symptoms    [] One acute, complicated injury    [] One or more chronic illnesses with  severe exacerbation, progression, or side effects of treatment    [] One acute or chronic illness or injury that poses a threat to life or bodily function    AMOUNT AND/OR COMPLEXITY OF DATA TO BE REVIEWED AND ANALYZED:   [] N/A    [x] Review of a prior external note(s)    [] Review of a test result(s)    [] Ordering of a test(s)    [] Assessment requiring an independent historian(s)    [] Independent interpretation of a test(s)    [] Discussion of management or test interpretation with external professional source    RISK OF COMPLICATIONS AND/OR MORBIDITY OR MORALITY OF PATIENT MANAGEMENT:   [] N/A    [] Minimal    [] Low    [x] Moderate    [] High  [x] Prescription drug management    [] Diagnosis or treatment significantly limited by social determinants of health    [] Drug therapy requiring intensive monitoring for toxicity    [] Decision regarding hospitalization or escalation of hospital-level care    [] Decision to de-escalate care because of poor prognosis    [] Parenteral controlled substances    LEVEL OF MEDICAL DECISION MAKING:   [] N/A    [] Straightforward    [] Low    [x] Moderate    [] High    PATIENT'S ABILITY AND CAPACITY TO RESPOND TO TREATMENT  [] N/A    [] Absent    [] Minimal    [x] Adequate    [] Robust    [] Unable to be determined    TOTAL TIME FOR SERVICES PERFORMED ON THE DATE OF THE ENCOUNTER:   [x] N/A    []  minutes    CPT CODE JUSTIFICATION:   [] N/A    [x] Level of MDM    [] Time    [] Meets criteria for both Level of MDM and Time

## 2023-07-07 ENCOUNTER — OFFICE VISIT (OUTPATIENT)
Dept: PSYCHIATRY | Facility: CLINIC | Age: 23
End: 2023-07-07
Payer: COMMERCIAL

## 2023-07-07 DIAGNOSIS — F41.1 GENERALIZED ANXIETY DISORDER WITH PANIC ATTACKS: Primary | ICD-10-CM

## 2023-07-07 DIAGNOSIS — F31.81 BIPOLAR 2 DISORDER: ICD-10-CM

## 2023-07-07 DIAGNOSIS — F41.0 GENERALIZED ANXIETY DISORDER WITH PANIC ATTACKS: Primary | ICD-10-CM

## 2023-07-07 PROCEDURE — 1160F RVW MEDS BY RX/DR IN RCRD: CPT | Mod: CPTII,95,, | Performed by: PHYSICIAN ASSISTANT

## 2023-07-07 PROCEDURE — 99214 PR OFFICE/OUTPT VISIT, EST, LEVL IV, 30-39 MIN: ICD-10-PCS | Mod: 95,,, | Performed by: PHYSICIAN ASSISTANT

## 2023-07-07 PROCEDURE — 1159F PR MEDICATION LIST DOCUMENTED IN MEDICAL RECORD: ICD-10-PCS | Mod: CPTII,95,, | Performed by: PHYSICIAN ASSISTANT

## 2023-07-07 PROCEDURE — 1159F MED LIST DOCD IN RCRD: CPT | Mod: CPTII,95,, | Performed by: PHYSICIAN ASSISTANT

## 2023-07-07 PROCEDURE — 1160F PR REVIEW ALL MEDS BY PRESCRIBER/CLIN PHARMACIST DOCUMENTED: ICD-10-PCS | Mod: CPTII,95,, | Performed by: PHYSICIAN ASSISTANT

## 2023-07-07 PROCEDURE — 99214 OFFICE O/P EST MOD 30 MIN: CPT | Mod: 95,,, | Performed by: PHYSICIAN ASSISTANT

## 2023-07-07 RX ORDER — LAMOTRIGINE 150 MG/1
300 TABLET ORAL DAILY
Qty: 180 TABLET | Refills: 1 | Status: SHIPPED | OUTPATIENT
Start: 2023-07-07 | End: 2024-01-08

## 2023-07-07 NOTE — PROGRESS NOTES
Outpatient Psychiatry Follow-Up Visit (PA-C)    7/7/2023    Clinical Status of Patient:  Outpatient (Ambulatory)    The patient location is:  Home at address on record.  The chief complaint leading to consultation is: mood disorder    Visit type: audiovisual    Face to Face time with patient: 17  25 minutes of total time spent on the encounter, which includes face to face time and non-face to face time preparing to see the patient (eg, review of tests), Obtaining and/or reviewing separately obtained history, Documenting clinical information in the electronic or other health record, Independently interpreting results (not separately reported) and communicating results to the patient/family/caregiver, or Care coordination (not separately reported).     Each patient to whom he or she provides medical services by telemedicine is:  (1) informed of the relationship between the physician and patient and the respective role of any other health care provider with respect to management of the patient; and (2) notified that he or she may decline to receive medical services by telemedicine and may withdraw from such care at any time.        Chief Complaint:  Sunshine Lund is a 22 y.o. female who presents today for follow-up of mood disorder and anxiety.  Met with patient.      Interval History and Content of Current Session:  Interim Events/Subjective Report/Content of Current Session:   Patient returns for follow up of bipolar II and anxiety. She is currently taking amitriptyline 50 mg at bedtime, buspirone 20 mg t.i.d.,increased last visit, lamotrigine 200 mg daily, and propranolol 10 mg TID, started last visit.  Pt states in the last month she has been feeling chest tightness, jitteriness, and intermittent increased goal directed frantic activity and decreased need for sleep, for a couple of days at a time.  Started OCP back, which can decrease blood levels of lamotrigine.  Reports palpitations have decreased with  propranolol.    Review of Systems   PSYCHIATRIC: Pertinant items are noted in the narrative.  CONSTITUTIONAL: No weight gain or loss.   MUSCULOSKELETAL: No pain or stiffness of the joints.  NEUROLOGIC: No weakness, sensory changes, seizures, confusion, memory loss, tremor or other abnormal movements.    Past Medical, Family and Social History: The patient's past medical, family and social history have been reviewed and updated as appropriate within the electronic medical record - see encounter notes.    Compliance: yes    Side effects: None    Risk Parameters:  Patient reports no suicidal ideation  Patient reports no homicidal ideation  Patient reports no self-injurious behavior  Patient reports no violent behavior    Exam (detailed: at least 9 elements; comprehensive: all 15 elements)   Constitutional  Vitals:  Most recent vital signs, dated less than 90 days prior to this appointment, were reviewed.   There were no vitals filed for this visit.     General:  unremarkable, age appropriate, casually dressed     Musculoskeletal  Muscle Strength/Tone:  not examined   Gait & Station:  not examined     Psychiatric  Speech:  no latency; no press   Mood & Affect:  steady, euthymic  congruent and appropriate   Thought Process:  normal and logical   Associations:  intact   Thought Content:  normal, no suicidality, no homicidality, delusions, or paranoia   Insight:  intact   Judgement: behavior is adequate to circumstances   Orientation:  grossly intact   Memory: intact for content of interview   Language: grossly intact   Attention Span & Concentration:  able to focus   Fund of Knowledge:  intact and appropriate to age and level of education     Assessment and Diagnosis   Status/Progress: Based on the examination today, the patient's problem(s) is/are adequately but not ideally controlled.  New problems have been presented today.   Diagnostic uncertainty and Lack of compliance are not complicating management of the primary  condition.  There are no active rule-out diagnoses for this patient at this time.     General Impression: Bipolar II and AMANDA, mood previously stable with lamotrigine 200 mg daily, sleep intermittent, and jitteriness and chest tightness present.  Pt has gotten a lot of benefit from lamotrigine so far, and her OCP could be decreasing effective dose.        ICD-10-CM ICD-9-CM   1. Generalized anxiety disorder with panic attacks  F41.1 300.02    F41.0 300.01   2. Bipolar 2 disorder  F31.81 296.89         1. Continue propranolol 10 mg t.i.d. p.r.n. for anxiety.  Risks/bebefits/side effects discussed, including orthostatic hypotension and bradycardia.  No documented history of asthma  2. Increase lamotrigine to 300 mg daily in a single dose, higher dose necessary due to interaction with OCP.  Risks/bebefits/side effects discussed.  Tolerating well so far, no rash.  3. Continue amitriptyline 50 mg q.h.s.  4. Continue buspirone  20 mg t.i.d. targeting anxiety.  5. Follow up in one month    Intervention/Counseling/Treatment Plan   Medication Management: Continue current medications. The risks and benefits of medication were discussed with the patient.      Return to Clinic: 1 month    MEDICAL DECISION MAKING ELEMENTS:    NUMBER AND COMPLEXITY OF PROBLEMS ADDRESSED AT THE ENCOUNTER:   [] N/A    [] One self-limited or minor problem    [] Two or more self-limited or minor problems    [] One stable, chronic illness    [] One acute, uncomplicated illness or injury    [] One stable, acute illness    [] One acute, uncomplicated illness or injury requiring hospital inpatient or observational level of care   [x] One or more chronic illnesses with exacerbation, progression, or side effects of treatment    [] Two or more stable, chronic illnesses    [] One undiagnosed new problem with uncertain prognosis    [] One acute illness with system symptoms    [] One acute, complicated injury    [] One or more chronic illnesses with severe  exacerbation, progression, or side effects of treatment    [] One acute or chronic illness or injury that poses a threat to life or bodily function    AMOUNT AND/OR COMPLEXITY OF DATA TO BE REVIEWED AND ANALYZED:   [] N/A    [x] Review of a prior external note(s)    [] Review of a test result(s)    [] Ordering of a test(s)    [] Assessment requiring an independent historian(s)    [] Independent interpretation of a test(s)    [] Discussion of management or test interpretation with external professional source    RISK OF COMPLICATIONS AND/OR MORBIDITY OR MORALITY OF PATIENT MANAGEMENT:   [] N/A    [] Minimal    [] Low    [x] Moderate    [] High  [x] Prescription drug management    [] Diagnosis or treatment significantly limited by social determinants of health    [] Drug therapy requiring intensive monitoring for toxicity    [] Decision regarding hospitalization or escalation of hospital-level care    [] Decision to de-escalate care because of poor prognosis    [] Parenteral controlled substances    LEVEL OF MEDICAL DECISION MAKING:   [] N/A    [] Straightforward    [] Low    [x] Moderate    [] High    PATIENT'S ABILITY AND CAPACITY TO RESPOND TO TREATMENT  [] N/A    [] Absent    [] Minimal    [x] Adequate    [] Robust    [] Unable to be determined    TOTAL TIME FOR SERVICES PERFORMED ON THE DATE OF THE ENCOUNTER:   [x] N/A    []  minutes    CPT CODE JUSTIFICATION:   [] N/A    [x] Level of MDM    [] Time    [] Meets criteria for both Level of MDM and Time

## 2023-07-14 RX ORDER — PROPRANOLOL HYDROCHLORIDE 10 MG/1
10 TABLET ORAL 3 TIMES DAILY PRN
Qty: 90 TABLET | Refills: 0 | Status: SHIPPED | OUTPATIENT
Start: 2023-07-14 | End: 2023-08-23 | Stop reason: SDUPTHER

## 2023-07-14 RX ORDER — PROPRANOLOL HYDROCHLORIDE 10 MG/1
TABLET ORAL
Qty: 270 TABLET | OUTPATIENT
Start: 2023-07-14

## 2023-07-31 PROBLEM — R31.9 URINARY TRACT INFECTION WITH HEMATURIA: Status: RESOLVED | Noted: 2023-04-26 | Resolved: 2023-07-31

## 2023-07-31 PROBLEM — N39.0 URINARY TRACT INFECTION WITH HEMATURIA: Status: RESOLVED | Noted: 2023-04-26 | Resolved: 2023-07-31

## 2023-08-09 ENCOUNTER — OFFICE VISIT (OUTPATIENT)
Dept: PSYCHIATRY | Facility: CLINIC | Age: 23
End: 2023-08-09
Payer: COMMERCIAL

## 2023-08-09 DIAGNOSIS — F31.81 BIPOLAR 2 DISORDER: Primary | ICD-10-CM

## 2023-08-09 DIAGNOSIS — F41.0 GENERALIZED ANXIETY DISORDER WITH PANIC ATTACKS: ICD-10-CM

## 2023-08-09 DIAGNOSIS — F41.1 GENERALIZED ANXIETY DISORDER WITH PANIC ATTACKS: ICD-10-CM

## 2023-08-09 PROCEDURE — 99214 OFFICE O/P EST MOD 30 MIN: CPT | Mod: 95,,, | Performed by: PHYSICIAN ASSISTANT

## 2023-08-09 PROCEDURE — 90833 PSYTX W PT W E/M 30 MIN: CPT | Mod: 95,,, | Performed by: PHYSICIAN ASSISTANT

## 2023-08-09 PROCEDURE — 90785 PR INTERACTIVE COMPLEXITY: ICD-10-PCS | Mod: 95,,, | Performed by: PHYSICIAN ASSISTANT

## 2023-08-09 PROCEDURE — 1160F PR REVIEW ALL MEDS BY PRESCRIBER/CLIN PHARMACIST DOCUMENTED: ICD-10-PCS | Mod: CPTII,95,, | Performed by: PHYSICIAN ASSISTANT

## 2023-08-09 PROCEDURE — 90833 PR PSYCHOTHERAPY W/PATIENT W/E&M, 30 MIN (ADD ON): ICD-10-PCS | Mod: 95,,, | Performed by: PHYSICIAN ASSISTANT

## 2023-08-09 PROCEDURE — 99214 PR OFFICE/OUTPT VISIT, EST, LEVL IV, 30-39 MIN: ICD-10-PCS | Mod: 95,,, | Performed by: PHYSICIAN ASSISTANT

## 2023-08-09 PROCEDURE — 90785 PSYTX COMPLEX INTERACTIVE: CPT | Mod: 95,,, | Performed by: PHYSICIAN ASSISTANT

## 2023-08-09 PROCEDURE — 1159F MED LIST DOCD IN RCRD: CPT | Mod: CPTII,95,, | Performed by: PHYSICIAN ASSISTANT

## 2023-08-09 PROCEDURE — 1159F PR MEDICATION LIST DOCUMENTED IN MEDICAL RECORD: ICD-10-PCS | Mod: CPTII,95,, | Performed by: PHYSICIAN ASSISTANT

## 2023-08-09 PROCEDURE — 1160F RVW MEDS BY RX/DR IN RCRD: CPT | Mod: CPTII,95,, | Performed by: PHYSICIAN ASSISTANT

## 2023-08-09 NOTE — PROGRESS NOTES
Outpatient Psychiatry Follow-Up Visit (HUI)    8/9/2023    Clinical Status of Patient:  Outpatient (Ambulatory)    The patient location is:  In her car in [place], [place]  The chief complaint leading to consultation is: mood disorder    Visit type: audiovisual- Pt's phone overheated during the visit and we had to reconnect, increasing the complexity of the interaction.    Face to Face time with patient: 25  45 minutes of total time spent on the encounter, which includes face to face time and non-face to face time preparing to see the patient (eg, review of tests), Obtaining and/or reviewing separately obtained history, Documenting clinical information in the electronic or other health record, Independently interpreting results (not separately reported) and communicating results to the patient/family/caregiver, or Care coordination (not separately reported).     Each patient to whom he or she provides medical services by telemedicine is:  (1) informed of the relationship between the physician and patient and the respective role of any other health care provider with respect to management of the patient; and (2) notified that he or she may decline to receive medical services by telemedicine and may withdraw from such care at any time.        Chief Complaint:  Sunshine Lund is a 23 y.o. female who presents today for follow-up of mood disorder and anxiety.  Met with patient.      Interval History and Content of Current Session:  Interim Events/Subjective Report/Content of Current Session:   Patient returns for follow up of bipolar II and anxiety. She is currently taking amitriptyline 50 mg at bedtime, buspirone 20 mg t.i.d., propranolol 10 mg TID, and lamotrigine 300 mg daily, increased last visit due to concomitant OCP rx.  Pt reports she recently found out that her boyfriend cheated on her once two years ago.  Feels like she is still reeling, and started couples counseling.  Isn't sure if she can trust him  anymore, but is willing to try and make it work if possible.  States that her best friend and mother have always been fans of him, and are supportive either way if she breaks up with him or stays together.      Pt states that prior to this conversation with her boyfriend she was doing very well and felt that th increase in lamotrigine helped with her mood and even anxiety significantly.  Denies side effects of the increase.    Psychotherapy:  Target symptoms: grief  Why chosen therapy is appropriate versus another modality: relevant to diagnosis  Outcome monitoring methods: self-report, observation  Therapeutic intervention type: supportive psychotherapy  Topics discussed/themes: relationships difficulties  The patient's response to the intervention is accepting. The patient's progress toward treatment goals is good.   Duration of intervention: 19 minutes.      Review of Systems   PSYCHIATRIC: Pertinant items are noted in the narrative.  CONSTITUTIONAL: No weight gain or loss.   MUSCULOSKELETAL: No pain or stiffness of the joints.  NEUROLOGIC: No weakness, sensory changes, seizures, confusion, memory loss, tremor or other abnormal movements.    Past Medical, Family and Social History: The patient's past medical, family and social history have been reviewed and updated as appropriate within the electronic medical record - see encounter notes.    Compliance: yes    Side effects: None    Risk Parameters:  Patient reports no suicidal ideation  Patient reports no homicidal ideation  Patient reports no self-injurious behavior  Patient reports no violent behavior    Exam (detailed: at least 9 elements; comprehensive: all 15 elements)   Constitutional  Vitals:  Most recent vital signs, dated less than 90 days prior to this appointment, were reviewed.   There were no vitals filed for this visit.     General:  unremarkable, age appropriate, casually dressed     Musculoskeletal  Muscle Strength/Tone:  not examined   Gait &  Station:  not examined     Psychiatric  Speech:  no latency; no press   Mood & Affect:  steady, sad  congruent and appropriate   Thought Process:  normal and logical   Associations:  intact   Thought Content:  normal, no suicidality, no homicidality, delusions, or paranoia   Insight:  intact   Judgement: behavior is adequate to circumstances   Orientation:  grossly intact   Memory: intact for content of interview   Language: grossly intact   Attention Span & Concentration:  able to focus   Fund of Knowledge:  intact and appropriate to age and level of education     Assessment and Diagnosis   Status/Progress: Based on the examination today, the patient's problem(s) is/are adequately but not ideally controlled.  New problems have been presented today.   Diagnostic uncertainty and Lack of compliance are not complicating management of the primary condition.  There are no active rule-out diagnoses for this patient at this time.     General Impression: Bipolar II and AMANDA, had been doing well with increase on lamotrigine to 300 mg daily with propranolol 10 mg TID PRN, amitriptyline 50 mg qHS, and buspirone 20 mg TID until problems with her boyfriend.  Will continue this regimen as pt seems to be handling the situation with stability and rationality.      ICD-10-CM ICD-9-CM   1. Bipolar 2 disorder  F31.81 296.89   2. Generalized anxiety disorder with panic attacks  F41.1 300.02    F41.0 300.01       1. Continue propranolol 10 mg t.i.d. p.r.n. for anxiety.  Risks/bebefits/side effects discussed, including orthostatic hypotension and bradycardia.  No documented history of asthma  2. Continue lamotrigine 300 mg daily in a single dose, higher dose necessary due to interaction with OCP.  Risks/bebefits/side effects discussed.  Tolerating well so far, no rash.  3. Continue amitriptyline 50 mg q.h.s.  4. Continue buspirone  20 mg t.i.d. targeting anxiety.  5. Follow up in three months    Intervention/Counseling/Treatment Plan    Medication Management: Continue current medications. The risks and benefits of medication were discussed with the patient.      Return to Clinic: 3 months    MEDICAL DECISION MAKING ELEMENTS:    NUMBER AND COMPLEXITY OF PROBLEMS ADDRESSED AT THE ENCOUNTER:   [] N/A    [] One self-limited or minor problem    [] Two or more self-limited or minor problems    [] One stable, chronic illness    [] One acute, uncomplicated illness or injury    [] One stable, acute illness    [] One acute, uncomplicated illness or injury requiring hospital inpatient or observational level of care   [] One or more chronic illnesses with exacerbation, progression, or side effects of treatment    [x] Two or more stable, chronic illnesses    [] One undiagnosed new problem with uncertain prognosis    [] One acute illness with system symptoms    [] One acute, complicated injury    [] One or more chronic illnesses with severe exacerbation, progression, or side effects of treatment    [] One acute or chronic illness or injury that poses a threat to life or bodily function    AMOUNT AND/OR COMPLEXITY OF DATA TO BE REVIEWED AND ANALYZED:   [] N/A    [x] Review of a prior external note(s)    [] Review of a test result(s)    [] Ordering of a test(s)    [] Assessment requiring an independent historian(s)    [] Independent interpretation of a test(s)    [] Discussion of management or test interpretation with external professional source    RISK OF COMPLICATIONS AND/OR MORBIDITY OR MORALITY OF PATIENT MANAGEMENT:   [] N/A    [] Minimal    [] Low    [x] Moderate    [] High  [x] Prescription drug management    [] Diagnosis or treatment significantly limited by social determinants of health    [] Drug therapy requiring intensive monitoring for toxicity    [] Decision regarding hospitalization or escalation of hospital-level care    [] Decision to de-escalate care because of poor prognosis    [] Parenteral controlled substances    LEVEL OF MEDICAL DECISION  MAKING:   [] N/A    [] Straightforward    [] Low    [x] Moderate    [] High    PATIENT'S ABILITY AND CAPACITY TO RESPOND TO TREATMENT  [] N/A    [] Absent    [] Minimal    [x] Adequate    [] Robust    [] Unable to be determined    TOTAL TIME FOR SERVICES PERFORMED ON THE DATE OF THE ENCOUNTER:   [x] N/A    []  minutes    CPT CODE JUSTIFICATION:   [] N/A    [x] Level of MDM    [] Time    [] Meets criteria for both Level of MDM and Time

## 2023-08-23 RX ORDER — PROPRANOLOL HYDROCHLORIDE 10 MG/1
10 TABLET ORAL 3 TIMES DAILY PRN
Qty: 90 TABLET | Refills: 0 | Status: SHIPPED | OUTPATIENT
Start: 2023-08-23 | End: 2023-12-12

## 2023-08-23 RX ORDER — PROPRANOLOL HYDROCHLORIDE 10 MG/1
TABLET ORAL
Qty: 270 TABLET | OUTPATIENT
Start: 2023-08-23

## 2023-10-11 NOTE — PROGRESS NOTES
CC: Well woman exam    HPI:  Sunshine Lund is a 22 y.o. female  presents for a well woman exam.  Reports pain with sex on insertion even with adequate lubrication/foreplay. With same partner for 3 years. She feels like its muscle spasms causing pain with insertion, has used vaginal dilators in past and vaginala estrogen with minimal relief.       Patient history:   History reviewed. No pertinent past medical history.  History reviewed. No pertinent surgical history.  OB History    Para Term  AB Living   0 0 0 0 0 0   SAB IAB Ectopic Multiple Live Births   0 0 0 0 0       GYN  Menopausal: No  History of abnormal paps: DENIES  Abnormal or postmenopausal bleeding: DENIES  History of abnormal mammograms:N/A   Family history of breast or ovarian cancer: DENIES  Any breast masses, pain, skin changes, or nipple discharge: DENIES  Possible recent STD exposure: denies, same partner for past 3 years  Contraception: OCPs    Pap: No result found, Done today  Mammogram: N/A      History reviewed. No pertinent family history.  Social History     Tobacco Use    Smoking status: Every Day     Types: Vaping with nicotine    Smokeless tobacco: Never   Substance Use Topics    Alcohol use: Not Currently    Drug use: Yes     Types: Marijuana     Comment: daily     Allergies: Patient has no known allergies.    Current Outpatient Medications:     amitriptyline (ELAVIL) 25 MG tablet, Take 0.5 tablets (12.5 mg total) by mouth nightly as needed for Insomnia., Disp: 90 tablet, Rfl: 0    busPIRone (BUSPAR) 10 MG tablet, Take 1 tablet (10 mg total) by mouth 3 (three) times daily., Disp: 90 tablet, Rfl: 1    doxycycline 50 MG capsule, Take 50 mg by mouth once daily., Disp: , Rfl:     norgestimate-ethinyl estradiol (ESTARYLLA ORAL), , Disp: , Rfl:     pantoprazole (PROTONIX) 40 MG tablet, Take 1 tablet (40 mg total) by mouth once daily. In the morning, 30 minutes before any food., Disp: 30 tablet, Rfl: 1    ziprasidone  (GEODON) 20 MG Cap, Take 1 capsule (20 mg total) by mouth 2 (two) times daily., Disp: 60 capsule, Rfl: 2       ROS:  GENERAL: Denies weight gain or weight loss. Feeling well overall.   SKIN: Denies rash or lesions.   HEAD: Denies head injury or headache.   NODES: Denies enlarged lymph nodes.   CHEST: Denies chest pain or shortness of breath.   CARDIOVASCULAR: Denies palpitations or left sided chest pain.   ABDOMEN: No abdominal pain, constipation, diarrhea, nausea, vomiting or rectal bleeding.   URINARY: No frequency, dysuria, hematuria, or burning on urination.  REPRODUCTIVE: See HPI.   BREASTS: The patient performs breast self-examination and denies pain, lumps, or nipple discharge.   HEMATOLOGIC: No easy bruisability or excessive bleeding.  MUSCULOSKELETAL: Denies joint pain or swelling.   NEUROLOGIC: Denies syncope or weakness.   PSYCHIATRIC: Denies depression, anxiety or mood swings.    Objective:   /77   Wt 52.4 kg (115 lb 8.3 oz)   LMP 09/23/2022 (Exact Date)   BMI 17.06 kg/m²       Physical Exam:  APPEARANCE: Well nourished, well developed, in no acute distress.  AFFECT: WNL, alert and oriented x 3  SKIN: No acne or hirsutism  NECK: Neck symmetric without masses or thyromegaly  NODES: No inguinal, cervical, axillary, or femoral lymph node enlargement  CHEST: Good respiratory effect  ABDOMEN: Soft.  No tenderness or masses.  No hepatosplenomegaly.  No hernias.  BREASTS: Symmetrical, no skin changes or visible lesions.  No palpable masses, nipple discharge bilaterally.  PELVIC: Normal external genitalia without lesions.  Normal hair distribution.  Adequate perineal body, normal urethral meatus.  Vagina moist and well rugated without lesions or discharge.  Cervix pink, without lesions, discharge or tenderness.  No significant cystocele or rectocele.  Bimanual exam shows uterus to be normal size, regular, mobile and nontender.  Adnexa without masses or tenderness. Visible muscle contraction when  speculum inserted and patient jumped up from bed briefly in pain  EXTREMITIES: No edema.    ASSESSMENT AND PLAN  1. Well woman exam  Ambulatory referral/consult to Gynecology    Liquid-Based Pap Smear, Screening    Ambulatory referral/consult to Physical/Occupational Therapy      2. Dyspareunia in female            Annual exam  Breast and pelvic exam: wnl  Patient counseled on ASCCP guidelines for cervical cytology screening  Cervical screening: completed today   HPV vaccine: completed 2010  Patient counseled on current recommendations for breast cancer screening  Mammogram screening: at 40  STD testing: not requested today  Contraception: OCPs, no refill needed at this time  Osteoporosis screening at 65    2. Dyspareunia: suspect vaginismus   - history and physical today with muscle contraction/spasms, consistent with vaginismus   - PFPT referral for eval/treatment  - discussed further treatment options available briefly today, patient would like to try PT first and then decide on next steps if no improvement        She was counseled to follow up with her PCP for other routine health maintenance      Follow up in about 1 year (around 10/6/2023).      Stephanie Heaney, MD OBGYN [facility]          Spironolactone Pregnancy And Lactation Text: This medication can cause feminization of the male fetus and should be avoided during pregnancy. The active metabolite is also found in breast milk.

## 2023-11-09 ENCOUNTER — OFFICE VISIT (OUTPATIENT)
Dept: PSYCHIATRY | Facility: CLINIC | Age: 23
End: 2023-11-09
Payer: COMMERCIAL

## 2023-11-09 DIAGNOSIS — F41.0 GENERALIZED ANXIETY DISORDER WITH PANIC ATTACKS: ICD-10-CM

## 2023-11-09 DIAGNOSIS — F41.1 GENERALIZED ANXIETY DISORDER WITH PANIC ATTACKS: ICD-10-CM

## 2023-11-09 DIAGNOSIS — F31.81 BIPOLAR 2 DISORDER: Primary | ICD-10-CM

## 2023-11-09 PROCEDURE — 1159F PR MEDICATION LIST DOCUMENTED IN MEDICAL RECORD: ICD-10-PCS | Mod: CPTII,95,, | Performed by: PHYSICIAN ASSISTANT

## 2023-11-09 PROCEDURE — 1159F MED LIST DOCD IN RCRD: CPT | Mod: CPTII,95,, | Performed by: PHYSICIAN ASSISTANT

## 2023-11-09 PROCEDURE — 99214 PR OFFICE/OUTPT VISIT, EST, LEVL IV, 30-39 MIN: ICD-10-PCS | Mod: 95,,, | Performed by: PHYSICIAN ASSISTANT

## 2023-11-09 PROCEDURE — 1160F PR REVIEW ALL MEDS BY PRESCRIBER/CLIN PHARMACIST DOCUMENTED: ICD-10-PCS | Mod: CPTII,95,, | Performed by: PHYSICIAN ASSISTANT

## 2023-11-09 PROCEDURE — 1160F RVW MEDS BY RX/DR IN RCRD: CPT | Mod: CPTII,95,, | Performed by: PHYSICIAN ASSISTANT

## 2023-11-09 PROCEDURE — 99214 OFFICE O/P EST MOD 30 MIN: CPT | Mod: 95,,, | Performed by: PHYSICIAN ASSISTANT

## 2023-11-09 RX ORDER — AMITRIPTYLINE HYDROCHLORIDE 75 MG/1
75 TABLET ORAL NIGHTLY
Qty: 90 TABLET | Refills: 1 | Status: SHIPPED | OUTPATIENT
Start: 2023-11-09 | End: 2024-05-07

## 2023-11-09 NOTE — PROGRESS NOTES
Outpatient Psychiatry Follow-Up Visit (HUI)    11/9/2023    Clinical Status of Patient:  Outpatient (Ambulatory)    The patient location is:  In her car in [place], [place]  The chief complaint leading to consultation is: mood disorder    Visit type: audiovisual    Face to Face time with patient: 6  15 minutes of total time spent on the encounter, which includes face to face time and non-face to face time preparing to see the patient (eg, review of tests), Obtaining and/or reviewing separately obtained history, Documenting clinical information in the electronic or other health record, Independently interpreting results (not separately reported) and communicating results to the patient/family/caregiver, or Care coordination (not separately reported).     Each patient to whom he or she provides medical services by telemedicine is:  (1) informed of the relationship between the physician and patient and the respective role of any other health care provider with respect to management of the patient; and (2) notified that he or she may decline to receive medical services by telemedicine and may withdraw from such care at any time.        Chief Complaint:  Sunshine Lund is a 23 y.o. female who presents today for follow-up of mood disorder and anxiety.  Met with patient.      Interval History and Content of Current Session:  Interim Events/Subjective Report/Content of Current Session:   Patient returns for follow up of bipolar II and anxiety. She is currently taking amitriptyline 50 mg at bedtime, buspirone 20 mg t.i.d., propranolol 10 mg TID, and lamotrigine 300 mg daily.  Pt reports she had accidentally run out of buspirone for a week or so, so was very anxious and depressed.  Starting to feel back to prior to running out.  She reports her mood is OK for the most part but she is still having anxious thoughts.  Sleeping well now and denies oversedation with amitriptyline.    PSYCHIATRIC: Pertinant items are noted in  the narrative.  CONSTITUTIONAL: No weight gain or loss.   MUSCULOSKELETAL: No pain or stiffness of the joints.  NEUROLOGIC: No weakness, sensory changes, seizures, confusion, memory loss, tremor or other abnormal movements.    Past Medical, Family and Social History: The patient's past medical, family and social history have been reviewed and updated as appropriate within the electronic medical record - see encounter notes.    Compliance: yes    Side effects: None    Risk Parameters:  Patient reports no suicidal ideation  Patient reports no homicidal ideation  Patient reports no self-injurious behavior  Patient reports no violent behavior    Exam (detailed: at least 9 elements; comprehensive: all 15 elements)   Constitutional  Vitals:  Most recent vital signs, dated less than 90 days prior to this appointment, were reviewed.   There were no vitals filed for this visit.     General:  unremarkable, age appropriate, casually dressed     Musculoskeletal  Muscle Strength/Tone:  not examined   Gait & Station:  not examined     Psychiatric  Speech:  no latency; no press   Mood & Affect:  steady, euthymic  congruent and appropriate   Thought Process:  normal and logical   Associations:  intact   Thought Content:  normal, no suicidality, no homicidality, delusions, or paranoia   Insight:  intact   Judgement: behavior is adequate to circumstances   Orientation:  grossly intact   Memory: intact for content of interview   Language: grossly intact   Attention Span & Concentration:  able to focus   Fund of Knowledge:  intact and appropriate to age and level of education     Assessment and Diagnosis   Status/Progress: Based on the examination today, the patient's problem(s) is/are adequately but not ideally controlled.  New problems have been presented today.   Diagnostic uncertainty and Lack of compliance are not complicating management of the primary condition.  There are no active rule-out diagnoses for this patient at this  time.     General Impression: Bipolar II and AMANDA, Still having intrusive and anxious thoughts.  Will increase amitriptyline targeting anxiety.      ICD-10-CM ICD-9-CM   1. Bipolar 2 disorder  F31.81 296.89   2. Generalized anxiety disorder with panic attacks  F41.1 300.02    F41.0 300.01     1. Continue propranolol 10 mg t.i.d. p.r.n. for anxiety.  Risks/bebefits/side effects discussed, including orthostatic hypotension and bradycardia.  No documented history of asthma  2. Continue lamotrigine 300 mg daily in a single dose, higher dose necessary due to interaction with OCP.  Risks/bebefits/side effects discussed.  Tolerating well so far, no rash.  3. Increase amitriptyline to 75 mg q.h.s.  4. Continue buspirone  20 mg t.i.d. targeting anxiety.  5. Follow up 1 month    Intervention/Counseling/Treatment Plan   Medication Management: Continue current medications. The risks and benefits of medication were discussed with the patient.      Return to Clinic: 1 month

## 2023-11-15 ENCOUNTER — LAB VISIT (OUTPATIENT)
Dept: LAB | Facility: HOSPITAL | Age: 23
End: 2023-11-15
Payer: COMMERCIAL

## 2023-11-15 ENCOUNTER — IMMUNIZATION (OUTPATIENT)
Dept: INTERNAL MEDICINE | Facility: CLINIC | Age: 23
End: 2023-11-15
Payer: COMMERCIAL

## 2023-11-15 ENCOUNTER — OFFICE VISIT (OUTPATIENT)
Dept: INTERNAL MEDICINE | Facility: CLINIC | Age: 23
End: 2023-11-15
Payer: COMMERCIAL

## 2023-11-15 VITALS
HEIGHT: 69 IN | HEART RATE: 103 BPM | OXYGEN SATURATION: 98 % | BODY MASS INDEX: 18.77 KG/M2 | WEIGHT: 126.75 LBS | DIASTOLIC BLOOD PRESSURE: 78 MMHG | SYSTOLIC BLOOD PRESSURE: 108 MMHG

## 2023-11-15 DIAGNOSIS — Z23 NEED FOR VACCINATION: Primary | ICD-10-CM

## 2023-11-15 DIAGNOSIS — Z00.00 ANNUAL PHYSICAL EXAM: Primary | ICD-10-CM

## 2023-11-15 DIAGNOSIS — Z00.00 ANNUAL PHYSICAL EXAM: ICD-10-CM

## 2023-11-15 LAB
ALBUMIN SERPL BCP-MCNC: 4.5 G/DL (ref 3.5–5.2)
ALP SERPL-CCNC: 52 U/L (ref 55–135)
ALT SERPL W/O P-5'-P-CCNC: 7 U/L (ref 10–44)
ANION GAP SERPL CALC-SCNC: 8 MMOL/L (ref 8–16)
AST SERPL-CCNC: 13 U/L (ref 10–40)
BASOPHILS # BLD AUTO: 0.04 K/UL (ref 0–0.2)
BASOPHILS NFR BLD: 0.8 % (ref 0–1.9)
BILIRUB SERPL-MCNC: 0.5 MG/DL (ref 0.1–1)
BUN SERPL-MCNC: 9 MG/DL (ref 6–20)
CALCIUM SERPL-MCNC: 9.7 MG/DL (ref 8.7–10.5)
CHLORIDE SERPL-SCNC: 105 MMOL/L (ref 95–110)
CHOLEST SERPL-MCNC: 206 MG/DL (ref 120–199)
CHOLEST/HDLC SERPL: 2.9 {RATIO} (ref 2–5)
CO2 SERPL-SCNC: 26 MMOL/L (ref 23–29)
CREAT SERPL-MCNC: 0.9 MG/DL (ref 0.5–1.4)
DIFFERENTIAL METHOD: ABNORMAL
EOSINOPHIL # BLD AUTO: 0.1 K/UL (ref 0–0.5)
EOSINOPHIL NFR BLD: 2.1 % (ref 0–8)
ERYTHROCYTE [DISTWIDTH] IN BLOOD BY AUTOMATED COUNT: 13.6 % (ref 11.5–14.5)
EST. GFR  (NO RACE VARIABLE): >60 ML/MIN/1.73 M^2
ESTIMATED AVG GLUCOSE: 94 MG/DL (ref 68–131)
GLUCOSE SERPL-MCNC: 83 MG/DL (ref 70–110)
HBA1C MFR BLD: 4.9 % (ref 4–5.6)
HCT VFR BLD AUTO: 39.2 % (ref 37–48.5)
HDLC SERPL-MCNC: 72 MG/DL (ref 40–75)
HDLC SERPL: 35 % (ref 20–50)
HGB BLD-MCNC: 12.5 G/DL (ref 12–16)
IMM GRANULOCYTES # BLD AUTO: 0.01 K/UL (ref 0–0.04)
IMM GRANULOCYTES NFR BLD AUTO: 0.2 % (ref 0–0.5)
LDLC SERPL CALC-MCNC: 123 MG/DL (ref 63–159)
LYMPHOCYTES # BLD AUTO: 1.3 K/UL (ref 1–4.8)
LYMPHOCYTES NFR BLD: 28.3 % (ref 18–48)
MCH RBC QN AUTO: 29.6 PG (ref 27–31)
MCHC RBC AUTO-ENTMCNC: 31.9 G/DL (ref 32–36)
MCV RBC AUTO: 93 FL (ref 82–98)
MONOCYTES # BLD AUTO: 0.4 K/UL (ref 0.3–1)
MONOCYTES NFR BLD: 8.2 % (ref 4–15)
NEUTROPHILS # BLD AUTO: 2.9 K/UL (ref 1.8–7.7)
NEUTROPHILS NFR BLD: 60.4 % (ref 38–73)
NONHDLC SERPL-MCNC: 134 MG/DL
NRBC BLD-RTO: 0 /100 WBC
PLATELET # BLD AUTO: 195 K/UL (ref 150–450)
PMV BLD AUTO: 10.9 FL (ref 9.2–12.9)
POTASSIUM SERPL-SCNC: 4.3 MMOL/L (ref 3.5–5.1)
PROT SERPL-MCNC: 7.6 G/DL (ref 6–8.4)
RBC # BLD AUTO: 4.23 M/UL (ref 4–5.4)
SODIUM SERPL-SCNC: 139 MMOL/L (ref 136–145)
TRIGL SERPL-MCNC: 55 MG/DL (ref 30–150)
TSH SERPL DL<=0.005 MIU/L-ACNC: 0.71 UIU/ML (ref 0.4–4)
WBC # BLD AUTO: 4.74 K/UL (ref 3.9–12.7)

## 2023-11-15 PROCEDURE — 99999 PR PBB SHADOW E&M-EST. PATIENT-LVL IV: ICD-10-PCS | Mod: PBBFAC,,, | Performed by: PHYSICIAN ASSISTANT

## 2023-11-15 PROCEDURE — 36415 COLL VENOUS BLD VENIPUNCTURE: CPT | Performed by: PHYSICIAN ASSISTANT

## 2023-11-15 PROCEDURE — 80061 LIPID PANEL: CPT | Performed by: PHYSICIAN ASSISTANT

## 2023-11-15 PROCEDURE — 3074F SYST BP LT 130 MM HG: CPT | Mod: CPTII,S$GLB,, | Performed by: PHYSICIAN ASSISTANT

## 2023-11-15 PROCEDURE — 1159F PR MEDICATION LIST DOCUMENTED IN MEDICAL RECORD: ICD-10-PCS | Mod: CPTII,S$GLB,, | Performed by: PHYSICIAN ASSISTANT

## 2023-11-15 PROCEDURE — 3078F DIAST BP <80 MM HG: CPT | Mod: CPTII,S$GLB,, | Performed by: PHYSICIAN ASSISTANT

## 2023-11-15 PROCEDURE — 80053 COMPREHEN METABOLIC PANEL: CPT | Performed by: PHYSICIAN ASSISTANT

## 2023-11-15 PROCEDURE — 83036 HEMOGLOBIN GLYCOSYLATED A1C: CPT | Performed by: PHYSICIAN ASSISTANT

## 2023-11-15 PROCEDURE — 90471 IMMUNIZATION ADMIN: CPT | Mod: S$GLB,,, | Performed by: INTERNAL MEDICINE

## 2023-11-15 PROCEDURE — 3008F PR BODY MASS INDEX (BMI) DOCUMENTED: ICD-10-PCS | Mod: CPTII,S$GLB,, | Performed by: PHYSICIAN ASSISTANT

## 2023-11-15 PROCEDURE — 85025 COMPLETE CBC W/AUTO DIFF WBC: CPT | Performed by: PHYSICIAN ASSISTANT

## 2023-11-15 PROCEDURE — 1160F PR REVIEW ALL MEDS BY PRESCRIBER/CLIN PHARMACIST DOCUMENTED: ICD-10-PCS | Mod: CPTII,S$GLB,, | Performed by: PHYSICIAN ASSISTANT

## 2023-11-15 PROCEDURE — 99395 PREV VISIT EST AGE 18-39: CPT | Mod: S$GLB,,, | Performed by: PHYSICIAN ASSISTANT

## 2023-11-15 PROCEDURE — 3008F BODY MASS INDEX DOCD: CPT | Mod: CPTII,S$GLB,, | Performed by: PHYSICIAN ASSISTANT

## 2023-11-15 PROCEDURE — 3078F PR MOST RECENT DIASTOLIC BLOOD PRESSURE < 80 MM HG: ICD-10-PCS | Mod: CPTII,S$GLB,, | Performed by: PHYSICIAN ASSISTANT

## 2023-11-15 PROCEDURE — 90686 FLU VACCINE (QUAD) GREATER THAN OR EQUAL TO 3YO PRESERVATIVE FREE IM: ICD-10-PCS | Mod: S$GLB,,, | Performed by: INTERNAL MEDICINE

## 2023-11-15 PROCEDURE — 99999 PR PBB SHADOW E&M-EST. PATIENT-LVL IV: CPT | Mod: PBBFAC,,, | Performed by: PHYSICIAN ASSISTANT

## 2023-11-15 PROCEDURE — 90471 FLU VACCINE (QUAD) GREATER THAN OR EQUAL TO 3YO PRESERVATIVE FREE IM: ICD-10-PCS | Mod: S$GLB,,, | Performed by: INTERNAL MEDICINE

## 2023-11-15 PROCEDURE — 1159F MED LIST DOCD IN RCRD: CPT | Mod: CPTII,S$GLB,, | Performed by: PHYSICIAN ASSISTANT

## 2023-11-15 PROCEDURE — 84443 ASSAY THYROID STIM HORMONE: CPT | Performed by: PHYSICIAN ASSISTANT

## 2023-11-15 PROCEDURE — 3074F PR MOST RECENT SYSTOLIC BLOOD PRESSURE < 130 MM HG: ICD-10-PCS | Mod: CPTII,S$GLB,, | Performed by: PHYSICIAN ASSISTANT

## 2023-11-15 PROCEDURE — 99395 PR PREVENTIVE VISIT,EST,18-39: ICD-10-PCS | Mod: S$GLB,,, | Performed by: PHYSICIAN ASSISTANT

## 2023-11-15 PROCEDURE — 90686 IIV4 VACC NO PRSV 0.5 ML IM: CPT | Mod: S$GLB,,, | Performed by: INTERNAL MEDICINE

## 2023-11-15 PROCEDURE — 1160F RVW MEDS BY RX/DR IN RCRD: CPT | Mod: CPTII,S$GLB,, | Performed by: PHYSICIAN ASSISTANT

## 2023-11-15 NOTE — PROGRESS NOTES
"Subjective     Patient ID: Sunshine Lund is a 23 y.o. female.    Chief Complaint: Annual Exam    HPI  Established pt of Brown Valdes MD (new to me)    Here for annual exam.     No acute concerns today    Things going well with psychiatry, recent med change, amitriptyline increased      Past Medical History:   Diagnosis Date    Bipolar disorder, unspecified        Social History     Tobacco Use    Smoking status: Every Day     Types: Vaping w/o nicotine    Smokeless tobacco: Never   Substance Use Topics    Alcohol use: Not Currently    Drug use: Yes     Types: Marijuana     Comment: daily     Review of patient's allergies indicates:  No Known Allergies      Review of Systems   Constitutional:  Negative for chills, fever and unexpected weight change.   Respiratory:  Negative for cough and shortness of breath.    Cardiovascular:  Negative for chest pain and leg swelling.   Gastrointestinal:  Negative for abdominal pain, nausea and vomiting.   Integumentary:  Negative for rash.   Neurological:  Negative for weakness and headaches.          Objective  /78 (BP Location: Right arm, Patient Position: Sitting, BP Method: Medium (Manual))   Pulse 103   Ht 5' 9" (1.753 m)   Wt 57.5 kg (126 lb 12.2 oz)   SpO2 98%   BMI 18.72 kg/m²       Physical Exam  Vitals reviewed.   Constitutional:       General: She is not in acute distress.     Appearance: She is well-developed. She is not ill-appearing.   HENT:      Head: Normocephalic and atraumatic.      Right Ear: Tympanic membrane, ear canal and external ear normal.      Left Ear: Tympanic membrane, ear canal and external ear normal.   Cardiovascular:      Rate and Rhythm: Normal rate and regular rhythm.      Heart sounds: No murmur heard.  Pulmonary:      Effort: Pulmonary effort is normal.      Breath sounds: Normal breath sounds. No wheezing or rales.   Abdominal:      General: Bowel sounds are normal.      Palpations: Abdomen is soft. There is no mass.      " "Tenderness: There is no abdominal tenderness. There is no guarding or rebound.      Hernia: No hernia is present.   Musculoskeletal:      Right lower leg: No edema.      Left lower leg: No edema.   Lymphadenopathy:      Cervical: No cervical adenopathy.   Skin:     General: Skin is warm and dry.      Findings: No rash.   Neurological:      Mental Status: She is alert.   Psychiatric:         Mood and Affect: Mood normal.            Assessment and Plan     1. Annual physical exam  -     CBC Auto Differential; Future; Expected date: 11/15/2023  -     Comprehensive Metabolic Panel; Future; Expected date: 11/15/2023  -     TSH; Future; Expected date: 11/15/2023  -     Lipid Panel; Future; Expected date: 11/15/2023  -     Hemoglobin A1C; Future; Expected date: 11/15/2023    Flu shot today  Vaccine reminder care for HPV  RTC in 1 year for next annual or sooner if needed.     Sadaf Bueno PA-C    Addendum:  Pt notified MA after my physical exam of mild mid abdominal pain, "feels muscular" like something popped in place. I re-examined pt, abdomen exam is benign. Pt will monitor and let me know of any changes.     Sadaf Bueno PA-C    Future Appointments   Date Time Provider Department Center   12/8/2023  1:00 PM Migdalia Evans DMSc, PA-C [facility]   12/21/2023  3:45 PM Aarti Fuentes MD [facility] O[facility]           "

## 2023-12-08 ENCOUNTER — TELEPHONE (OUTPATIENT)
Dept: PSYCHIATRY | Facility: CLINIC | Age: 23
End: 2023-12-08
Payer: COMMERCIAL

## 2023-12-08 NOTE — TELEPHONE ENCOUNTER
----- Message from Andree Hogan sent at 12/8/2023  1:09 PM CST -----  Regarding: pt call back  Name of Who is Calling:Pt         What is the request in detail: Pt stated virtual isnt working can office contact her           Can the clinic reply by NESHANER: yes         [facility]:Telephone Information:  Mobile          [phone]

## 2023-12-08 NOTE — TELEPHONE ENCOUNTER
Called pt back. Epic down. Appt r/s'd for 12/11 @ 11am. Made pt aware system has been down for staff as well. No concerns voiced. Apologized for tech issues.

## 2023-12-12 ENCOUNTER — PATIENT MESSAGE (OUTPATIENT)
Dept: PSYCHIATRY | Facility: CLINIC | Age: 23
End: 2023-12-12

## 2023-12-12 ENCOUNTER — OFFICE VISIT (OUTPATIENT)
Dept: PSYCHIATRY | Facility: CLINIC | Age: 23
End: 2023-12-12
Payer: COMMERCIAL

## 2023-12-12 DIAGNOSIS — F43.10 PTSD (POST-TRAUMATIC STRESS DISORDER): ICD-10-CM

## 2023-12-12 DIAGNOSIS — Z73.1 ACCENTUATION OF PERSONALITY TRAITS: ICD-10-CM

## 2023-12-12 DIAGNOSIS — F41.1 GENERALIZED ANXIETY DISORDER WITH PANIC ATTACKS: ICD-10-CM

## 2023-12-12 DIAGNOSIS — F31.81 BIPOLAR 2 DISORDER: Primary | ICD-10-CM

## 2023-12-12 DIAGNOSIS — T74.02XS: ICD-10-CM

## 2023-12-12 DIAGNOSIS — F41.0 GENERALIZED ANXIETY DISORDER WITH PANIC ATTACKS: ICD-10-CM

## 2023-12-12 PROCEDURE — 99214 PR OFFICE/OUTPT VISIT, EST, LEVL IV, 30-39 MIN: ICD-10-PCS | Mod: 95,,, | Performed by: PHYSICIAN ASSISTANT

## 2023-12-12 PROCEDURE — 1159F MED LIST DOCD IN RCRD: CPT | Mod: CPTII,95,, | Performed by: PHYSICIAN ASSISTANT

## 2023-12-12 PROCEDURE — 90833 PR PSYCHOTHERAPY W/PATIENT W/E&M, 30 MIN (ADD ON): ICD-10-PCS | Mod: 95,,, | Performed by: PHYSICIAN ASSISTANT

## 2023-12-12 PROCEDURE — 3044F PR MOST RECENT HEMOGLOBIN A1C LEVEL <7.0%: ICD-10-PCS | Mod: CPTII,95,, | Performed by: PHYSICIAN ASSISTANT

## 2023-12-12 PROCEDURE — 3044F HG A1C LEVEL LT 7.0%: CPT | Mod: CPTII,95,, | Performed by: PHYSICIAN ASSISTANT

## 2023-12-12 PROCEDURE — 1160F PR REVIEW ALL MEDS BY PRESCRIBER/CLIN PHARMACIST DOCUMENTED: ICD-10-PCS | Mod: CPTII,95,, | Performed by: PHYSICIAN ASSISTANT

## 2023-12-12 PROCEDURE — 1160F RVW MEDS BY RX/DR IN RCRD: CPT | Mod: CPTII,95,, | Performed by: PHYSICIAN ASSISTANT

## 2023-12-12 PROCEDURE — 90833 PSYTX W PT W E/M 30 MIN: CPT | Mod: 95,,, | Performed by: PHYSICIAN ASSISTANT

## 2023-12-12 PROCEDURE — 1159F PR MEDICATION LIST DOCUMENTED IN MEDICAL RECORD: ICD-10-PCS | Mod: CPTII,95,, | Performed by: PHYSICIAN ASSISTANT

## 2023-12-12 PROCEDURE — 99214 OFFICE O/P EST MOD 30 MIN: CPT | Mod: 95,,, | Performed by: PHYSICIAN ASSISTANT

## 2023-12-12 RX ORDER — ARIPIPRAZOLE 2 MG/1
2 TABLET ORAL DAILY
Qty: 30 TABLET | Refills: 2 | Status: SHIPPED | OUTPATIENT
Start: 2023-12-12 | End: 2024-01-12 | Stop reason: SDUPTHER

## 2023-12-12 RX ORDER — BUSPIRONE HYDROCHLORIDE 10 MG/1
20 TABLET ORAL 3 TIMES DAILY
Qty: 180 TABLET | Refills: 5 | Status: SHIPPED | OUTPATIENT
Start: 2023-12-12 | End: 2024-06-09
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
## 2024-01-08 RX ORDER — LAMOTRIGINE 150 MG/1
TABLET ORAL
Qty: 180 TABLET | Refills: 1 | Status: SHIPPED | OUTPATIENT
Start: 2024-01-08 | End: 2024-02-22 | Stop reason: SDUPTHER

## 2024-01-11 ENCOUNTER — OFFICE VISIT (OUTPATIENT)
Dept: OBSTETRICS AND GYNECOLOGY | Facility: CLINIC | Age: 24
End: 2024-01-11
Payer: COMMERCIAL

## 2024-01-11 VITALS
WEIGHT: 132.25 LBS | SYSTOLIC BLOOD PRESSURE: 110 MMHG | HEIGHT: 69 IN | DIASTOLIC BLOOD PRESSURE: 70 MMHG | BODY MASS INDEX: 19.59 KG/M2

## 2024-01-11 DIAGNOSIS — Z30.430 ENCOUNTER FOR INSERTION OF INTRAUTERINE CONTRACEPTIVE DEVICE (IUD): ICD-10-CM

## 2024-01-11 DIAGNOSIS — Z01.419 ENCOUNTER FOR WELL WOMAN EXAM WITH ROUTINE GYNECOLOGICAL EXAM: Primary | ICD-10-CM

## 2024-01-11 DIAGNOSIS — Z32.02 PREGNANCY TEST NEGATIVE: ICD-10-CM

## 2024-01-11 DIAGNOSIS — Z11.3 SCREENING FOR STD (SEXUALLY TRANSMITTED DISEASE): ICD-10-CM

## 2024-01-11 LAB
B-HCG UR QL: NEGATIVE
CTP QC/QA: YES

## 2024-01-11 PROCEDURE — 3008F BODY MASS INDEX DOCD: CPT | Mod: CPTII,S$GLB,,

## 2024-01-11 PROCEDURE — 81025 URINE PREGNANCY TEST: CPT | Mod: S$GLB,,,

## 2024-01-11 PROCEDURE — 87491 CHLMYD TRACH DNA AMP PROBE: CPT

## 2024-01-11 PROCEDURE — 3074F SYST BP LT 130 MM HG: CPT | Mod: CPTII,S$GLB,,

## 2024-01-11 PROCEDURE — 99395 PREV VISIT EST AGE 18-39: CPT | Mod: S$GLB,,,

## 2024-01-11 PROCEDURE — 3078F DIAST BP <80 MM HG: CPT | Mod: CPTII,S$GLB,,

## 2024-01-11 PROCEDURE — 99999 PR PBB SHADOW E&M-EST. PATIENT-LVL III: CPT | Mod: PBBFAC,,,

## 2024-01-12 ENCOUNTER — OFFICE VISIT (OUTPATIENT)
Dept: PSYCHIATRY | Facility: CLINIC | Age: 24
End: 2024-01-12
Payer: COMMERCIAL

## 2024-01-12 DIAGNOSIS — F41.1 GENERALIZED ANXIETY DISORDER WITH PANIC ATTACKS: ICD-10-CM

## 2024-01-12 DIAGNOSIS — F41.0 GENERALIZED ANXIETY DISORDER WITH PANIC ATTACKS: ICD-10-CM

## 2024-01-12 DIAGNOSIS — F31.81 BIPOLAR 2 DISORDER: Primary | ICD-10-CM

## 2024-01-12 DIAGNOSIS — F43.10 PTSD (POST-TRAUMATIC STRESS DISORDER): ICD-10-CM

## 2024-01-12 LAB
C TRACH DNA SPEC QL NAA+PROBE: NOT DETECTED
N GONORRHOEA DNA SPEC QL NAA+PROBE: NOT DETECTED

## 2024-01-12 PROCEDURE — 1160F RVW MEDS BY RX/DR IN RCRD: CPT | Mod: CPTII,95,, | Performed by: PHYSICIAN ASSISTANT

## 2024-01-12 PROCEDURE — 99214 OFFICE O/P EST MOD 30 MIN: CPT | Mod: 95,,, | Performed by: PHYSICIAN ASSISTANT

## 2024-01-12 PROCEDURE — 1159F MED LIST DOCD IN RCRD: CPT | Mod: CPTII,95,, | Performed by: PHYSICIAN ASSISTANT

## 2024-01-12 RX ORDER — ARIPIPRAZOLE 5 MG/1
5 TABLET ORAL DAILY
Qty: 90 TABLET | Refills: 1 | Status: SHIPPED | OUTPATIENT
Start: 2024-01-12 | End: 2024-07-10

## 2024-01-12 NOTE — PROGRESS NOTES
Outpatient Psychiatry Follow-Up Visit (HUI)    1/12/2024    Clinical Status of Patient:  Outpatient (Ambulatory)    The patient location is:  In her car in [place], [place]  The chief complaint leading to consultation is: mood disorder    Visit type: audiovisual    Face to Face time with patient: 15  25 minutes of total time spent on the encounter, which includes face to face time and non-face to face time preparing to see the patient (eg, review of tests), Obtaining and/or reviewing separately obtained history, Documenting clinical information in the electronic or other health record, Independently interpreting results (not separately reported) and communicating results to the patient/family/caregiver, or Care coordination (not separately reported).     Each patient to whom he or she provides medical services by telemedicine is:  (1) informed of the relationship between the physician and patient and the respective role of any other health care provider with respect to management of the patient; and (2) notified that he or she may decline to receive medical services by telemedicine and may withdraw from such care at any time.        Chief Complaint:  Sunshine Lund is a 23 y.o. female who presents today for follow-up of mood disorder and anxiety.  Met with patient.      Interval History and Content of Current Session:  Interim Events/Subjective Report/Content of Current Session:   Patient returns for follow up of bipolar II and anxiety. She is currently taking amitriptyline 75 mg at bedtime, buspirone 20 mg PRN, lamotrigine 300 mg daily, and aripiprazole 2 mg daily, added last visit.  She reports she is doing better, hypomanic sxs have resolved and is having more good days than bad with her mood now.  She denies side effects of aripiprazole, but does feel like she is zoning out more frequently.  Unsure if this is related to aripiprazole.  States if she takes it at bedtime she has more trouble sleeping.   Otherwise doing well.  Today we discussed features of OCD vs OCPD, pt does not display OCD sxs.  She does report some personality/anxiety related sensory preferences, such as not having things stick to her feet, not having wet hands, and not being able to sleep if her hands or feet feel too dry.      PSYCHIATRIC: Pertinant items are noted in the narrative.  CONSTITUTIONAL: No weight gain or loss.   MUSCULOSKELETAL: No pain or stiffness of the joints.  NEUROLOGIC: No weakness, sensory changes, seizures, confusion, memory loss, tremor or other abnormal movements.    Past Medical, Family and Social History: The patient's past medical, family and social history have been reviewed and updated as appropriate within the electronic medical record - see encounter notes.    Compliance: yes    Side effects: None    Risk Parameters:  Patient reports no suicidal ideation  Patient reports no homicidal ideation  Patient reports no self-injurious behavior  Patient reports no violent behavior    Exam (detailed: at least 9 elements; comprehensive: all 15 elements)   Constitutional  Vitals:  Most recent vital signs, dated less than 90 days prior to this appointment, were reviewed.   There were no vitals filed for this visit.     General:  unremarkable, age appropriate, casually dressed     Musculoskeletal  Muscle Strength/Tone:  not examined   Gait & Station:  not examined     Psychiatric  Speech:  no latency; no press   Mood & Affect:  steady, euthymic  congruent and appropriate   Thought Process:  normal and logical   Associations:  intact   Thought Content:  normal, no suicidality, no homicidality, delusions, or paranoia   Insight:  intact   Judgement: behavior is adequate to circumstances   Orientation:  grossly intact   Memory: intact for content of interview   Language: grossly intact   Attention Span & Concentration:  able to focus   Fund of Knowledge:  intact and appropriate to age and level of education     Assessment and  Diagnosis   Status/Progress: Based on the examination today, the patient's problem(s) is/are improved.  New problems have been presented today.   Diagnostic uncertainty and Lack of compliance are not complicating management of the primary condition.  There are no active rule-out diagnoses for this patient at this time.     General Impression: Bipolar II,AMANDA, and PTSD, diagnosed last visit.  Partial positive response to aripiprazole.  Will increase to 5 mg.      ICD-10-CM ICD-9-CM   1. Bipolar 2 disorder  F31.81 296.89   2. Generalized anxiety disorder with panic attacks  F41.1 300.02    F41.0 300.01   3. PTSD (post-traumatic stress disorder)  F43.10 309.81         1. Increasing aripiprazole to 5 mg daily.  I have explained the risks, benefits, and alternatives of medication treatment in detail. We specifically discussed risks and benefits of medication treatment, including but not limited to, headache, nausea, GI upset,  potential for metabolic syndrome: appetite stimulation, weight gain, dysregulation of blood sugar, diabetes, increase in lipids . Patient voices understanding and all questions have been answered. Patient agrees to treatment plan and medication trial.    2. Continue lamotrigine 300 mg daily in a single dose, higher dose necessary due to interaction with OCP.  Risks/bebefits/side effects discussed.  Tolerating well so far, no rash.  3. Continue amitriptyline 75 mg q.h.s.  4. Continue buspirone  20 mg PRN targeting anxiety.  5. Follow up 1 month    Intervention/Counseling/Treatment Plan   Medication Management: Continue current medications. The risks and benefits of medication were discussed with the patient.      Return to Clinic: 1 month

## 2024-01-24 NOTE — PROGRESS NOTES
"Chief Complaint: Well Woman Exam     HPI:      New to me - previously seen by Dr. Alfredo Gonsalez Kevon is a 23 y.o.  who presents today for well woman exam.  LMP: Patient's last menstrual period was 2023.  Would like to discuss BC - currently on OCP's and reports mood swings.  Has tried others in the past and had same issues.  Interested in stopping OCP and trying IUD.  No other issues, problems, or complaints. Specifically, patient denies abnormal vaginal bleeding, discharge, pelvic pain, urinary problems, or changes in appetite. Ms. Lund is currently sexually active with a single male partner. She is currently using no method for contraception. She would like STD screening today.    Previous Pap: NILM (10/06/2022)    STD/STI Hx: Neg  Tobacco use:  No  Alcohol use:  Yes - socially  Exercise regimen:  No  Employment:      FH: (note:  unknown hx on paternal side - father is adopted)  Breast cancer: none  Colon cancer: none  Ovarian cancer: none  Endometrial cancer: none    Gardasil:Completed     Ms. Lund confirms that she wears her seatbelt when riding in the car and does not text while driving.     OB History          0    Para   0    Term   0       0    AB   0    Living   0         SAB   0    IAB   0    Ectopic   0    Multiple   0    Live Births   0                 ROS:     GENERAL: Denies unintentional weight gain or weight loss. Feeling well overall.   SKIN: Denies rash or lesions.   HEENT: Denies headaches, or vision changes.   CARDIOVASCULAR: Denies palpitations or chest pain.   RESPIRATORY: Denies shortness of breath or dyspnea on exertion.  BREASTS: Denies lumps or nipple discharge.   ABDOMEN: Denies constipation, diarrhea, nausea, vomiting, change in appetite.  URINARY: Denies frequency, dysuria.  NEUROLOGIC: Denies syncope or weakness.   PSYCHIATRIC: Denies uncontrolled depression or anxiety.    Physical Exam:      PHYSICAL EXAM:  /70   Ht 5' 9" (1.753 m)   Wt " 60 kg (132 lb 4.4 oz)   LMP 12/28/2023   BMI 19.53 kg/m²   Body mass index is 19.53 kg/m².     APPEARANCE: Well nourished, well developed, in no acute distress.  PSYCH: Appropriate mood and affect.  SKIN: No acne or hirsutism  NECK: Neck symmetric without masses  NODES: No inguinal, axillary, or supraclavicular lymph node enlargement  ABDOMEN: Soft.  No tenderness or masses.    CARDIOVASCULAR: No edema of peripheral extremities  BREASTS: Symmetrical, no visible skin lesions. No palpable masses. No nipple discharge bilaterally.  PELVIC: Normal external genitalia without lesions.  Normal hair distribution.  Adequate perineal body, normal urethral meatus.  Vagina moist and well rugated. Without lesions. Vagina without discharge.  Cervix pink, without lesions, discharge or tenderness.  No significant cystocele or rectocele.  Bimanual exam shows uterus to be normal size, regular, mobile and nontender.  Adnexa without masses or tenderness.      Assessment/Plan:     Encounter for well woman exam with routine gynecological exam  -     Counseled patient regarding healthy diet and regular exercise, daily multivitamin, daily seat belt use.   -     BP normotensive  -     She denies abuse and feels safe at home.  -     Pap smear:  UTD (next due 2025)  -     Contraception:  Pt would like to come off OCP's/hormones.  Discussed option of IUD.  Risks/benefits reviewed.  Pt would like IUD placed.  Will have her schedule.  -     STD screening:  collected     Pregnancy test negative  -     POCT urine pregnancy: Neg    Screening for STD (sexually transmitted disease)  -     C. trachomatis/N. gonorrhoeae by AMP DNA    Encounter for insertion of intrauterine contraceptive device (IUD)  -     Insertion of IUD-Future; Future  -     Device Authorization Order  -     US Limited Non-Billable; Future; Expected date: 01/11/2024    Follow up in about 1 year for annual exam or sooner PRN.    Counseling:     Patient was counseled today on current  ASCCP pap guidelines, the recommendation for yearly physical exams, healthy diet and exercise routines, safe driving habits, and breast self awareness. She is to see her PCP for other health maintenance.     Use of the [employer] Patient Portal discussed and encouraged during today's visit.   Counseling time: 15 minutes    Isabela Josue (Maggie), KATYA  Obstetrics and Gynecology  [facility]

## 2024-01-31 ENCOUNTER — OFFICE VISIT (OUTPATIENT)
Dept: OBSTETRICS AND GYNECOLOGY | Facility: CLINIC | Age: 24
End: 2024-01-31
Payer: COMMERCIAL

## 2024-01-31 VITALS
BODY MASS INDEX: 20.18 KG/M2 | WEIGHT: 136.25 LBS | DIASTOLIC BLOOD PRESSURE: 77 MMHG | HEIGHT: 69 IN | SYSTOLIC BLOOD PRESSURE: 118 MMHG

## 2024-01-31 DIAGNOSIS — Z30.430 ENCOUNTER FOR INSERTION OF INTRAUTERINE CONTRACEPTIVE DEVICE (IUD): Primary | ICD-10-CM

## 2024-01-31 LAB
B-HCG UR QL: NEGATIVE
CTP QC/QA: YES

## 2024-01-31 PROCEDURE — 99999 PR PBB SHADOW E&M-EST. PATIENT-LVL III: CPT | Mod: PBBFAC,,, | Performed by: STUDENT IN AN ORGANIZED HEALTH CARE EDUCATION/TRAINING PROGRAM

## 2024-01-31 PROCEDURE — 99499 UNLISTED E&M SERVICE: CPT | Mod: S$GLB,,, | Performed by: STUDENT IN AN ORGANIZED HEALTH CARE EDUCATION/TRAINING PROGRAM

## 2024-01-31 PROCEDURE — 58300 INSERT INTRAUTERINE DEVICE: CPT | Mod: S$GLB,,, | Performed by: STUDENT IN AN ORGANIZED HEALTH CARE EDUCATION/TRAINING PROGRAM

## 2024-01-31 PROCEDURE — 81025 URINE PREGNANCY TEST: CPT | Mod: S$GLB,,, | Performed by: STUDENT IN AN ORGANIZED HEALTH CARE EDUCATION/TRAINING PROGRAM

## 2024-01-31 NOTE — PROCEDURES
Insertion of IUD    Date/Time: 1/31/2024 10:15 AM    Performed by: Nargis Brown MD  Authorized by: Nargis Brown MD    Consent:     Consent obtained:  Prior to procedure the appropriate consent was completed and verified    Consent given by:  Patient    Procedure risks and benefits discussed: yes      Patient questions answered: yes      Patient agrees, verbalizes understanding, and wants to proceed: yes     Device to be inserted was verified by patient: yes  Insertion Procedure:   17.5 mcg levonorgestreL 17.5 mcg/24 hrs (5 yrs) 19.5 mg       Pelvic exam performed: yes      Negative urine pregnancy test: yes      Cervix cleaned and prepped: yes      Speculum placed in vagina: yes      Tenaculum applied to cervix: yes      Uterus sounded: yes      Uterus sound depth (cm):  8    IUD inserted with no complications: yes      IUD type:  Kyleena    Strings trimmed: yes    Post-procedure:     Patient tolerated procedure well: yes    Comments:      Pre-procedure counseling:  Discussed the risks and benefits of the IUD with the patient and answered her questions.  I discussed the possibilities of IUD expulsion, infection, and uterine perforation with the patient.  Informed consent (written and verbal) obtained and placed onto the chart prior to procedure.       Pelvic:   Normal external genitalia with no lesions, no blood or discharge in the vault, vagina pink moist and rugated with no lesions, cervix smooth and pink without lesions. IUD placed with ease and tolerated well. Tenaculum sites hemostatic.    IUD placed under US guidance. IUD visualized at fundus with no evidence of perforation. Placement also confirmed with TVUS afterwards.    Plan:  Ibuprofen or tylenol PRN cramping.  Discussed risks of miscarriage or ectopic pregnancy if pregnancy is to occur.  Backup contraception x 7 days.  Abnormal bleeding may occur 3-6 months after placement.  Pain, fever, bleeding precautions given.  Discussed Kyleena expires at 5  yrs.  6 week string check

## 2024-02-22 ENCOUNTER — OFFICE VISIT (OUTPATIENT)
Dept: PSYCHIATRY | Facility: CLINIC | Age: 24
End: 2024-02-22
Payer: COMMERCIAL

## 2024-02-22 DIAGNOSIS — F43.10 PTSD (POST-TRAUMATIC STRESS DISORDER): ICD-10-CM

## 2024-02-22 DIAGNOSIS — F41.0 GENERALIZED ANXIETY DISORDER WITH PANIC ATTACKS: ICD-10-CM

## 2024-02-22 DIAGNOSIS — F31.81 BIPOLAR 2 DISORDER: Primary | ICD-10-CM

## 2024-02-22 DIAGNOSIS — F41.1 GENERALIZED ANXIETY DISORDER WITH PANIC ATTACKS: ICD-10-CM

## 2024-02-22 PROCEDURE — 1160F RVW MEDS BY RX/DR IN RCRD: CPT | Mod: CPTII,95,, | Performed by: PHYSICIAN ASSISTANT

## 2024-02-22 PROCEDURE — 99213 OFFICE O/P EST LOW 20 MIN: CPT | Mod: 95,,, | Performed by: PHYSICIAN ASSISTANT

## 2024-02-22 PROCEDURE — 1159F MED LIST DOCD IN RCRD: CPT | Mod: CPTII,95,, | Performed by: PHYSICIAN ASSISTANT

## 2024-02-22 RX ORDER — LAMOTRIGINE 150 MG/1
TABLET ORAL
Qty: 60 TABLET | Refills: 1 | Status: SHIPPED | OUTPATIENT
Start: 2024-02-22

## 2024-02-22 NOTE — PROGRESS NOTES
Outpatient Psychiatry Follow-Up Visit (HUI)    2/22/2024    Clinical Status of Patient:  Outpatient (Ambulatory)    The patient location is:  At home at address on record in [place]  The chief complaint leading to consultation is: mood disorder    Visit type: audiovisual    Face to Face time with patient: 15  25 minutes of total time spent on the encounter, which includes face to face time and non-face to face time preparing to see the patient (eg, review of tests), Obtaining and/or reviewing separately obtained history, Documenting clinical information in the electronic or other health record, Independently interpreting results (not separately reported) and communicating results to the patient/family/caregiver, or Care coordination (not separately reported).     Each patient to whom he or she provides medical services by telemedicine is:  (1) informed of the relationship between the physician and patient and the respective role of any other health care provider with respect to management of the patient; and (2) notified that he or she may decline to receive medical services by telemedicine and may withdraw from such care at any time.        Chief Complaint:  Sunshine Lund is a 23 y.o. female who presents today for follow-up of mood disorder and anxiety.  Met with patient.      Interval History and Content of Current Session:  Interim Events/Subjective Report/Content of Current Session:   Patient returns for follow up of bipolar II and anxiety. She is currently taking amitriptyline 75 mg at bedtime, buspirone 20 mg PRN, lamotrigine 300 mg daily, and aripiprazole 5 mg daily, increased last visit.  She reports she is doing well over all now.  Not feeling depressed or hypomanic.  PTSD symptoms well managed.  She is off her OCP and has an IUD now.        PSYCHIATRIC: Pertinant items are noted in the narrative.  CONSTITUTIONAL: No weight gain or loss.   MUSCULOSKELETAL: No pain or stiffness of the  joints.  NEUROLOGIC: No weakness, sensory changes, seizures, confusion, memory loss, tremor or other abnormal movements.    Past Medical, Family and Social History: The patient's past medical, family and social history have been reviewed and updated as appropriate within the electronic medical record - see encounter notes.    Compliance: yes    Side effects: None    Risk Parameters:  Patient reports no suicidal ideation  Patient reports no homicidal ideation  Patient reports no self-injurious behavior  Patient reports no violent behavior    Exam (detailed: at least 9 elements; comprehensive: all 15 elements)   Constitutional  Vitals:  Most recent vital signs, dated less than 90 days prior to this appointment, were reviewed.   There were no vitals filed for this visit.     General:  unremarkable, age appropriate, casually dressed     Musculoskeletal  Muscle Strength/Tone:  not examined   Gait & Station:  not examined     Psychiatric  Speech:  no latency; no press   Mood & Affect:  steady, euthymic  congruent and appropriate   Thought Process:  normal and logical   Associations:  intact   Thought Content:  normal, no suicidality, no homicidality, delusions, or paranoia   Insight:  intact   Judgement: behavior is adequate to circumstances   Orientation:  grossly intact   Memory: intact for content of interview   Language: grossly intact   Attention Span & Concentration:  able to focus   Fund of Knowledge:  intact and appropriate to age and level of education     Assessment and Diagnosis   Status/Progress: Based on the examination today, the patient's problem(s) is/are improved.  New problems have not been presented today.   Diagnostic uncertainty and Lack of compliance are not complicating management of the primary condition.  There are no active rule-out diagnoses for this patient at this time.     General Impression: Bipolar II,AMANDA, and PTSD, now doing well.  Pt is off her OCP so may decrease lamotrigine to 200 mg  next visit.      ICD-10-CM ICD-9-CM   1. Bipolar 2 disorder  F31.81 296.89   2. Generalized anxiety disorder with panic attacks  F41.1 300.02    F41.0 300.01   3. PTSD (post-traumatic stress disorder)  F43.10 309.81       1. Continue aripiprazole 5 mg daily.  Risks/bebefits/side effects discussed.  2. Continue lamotrigine 300 mg daily in a single dose, higher dose necessary due to interaction with OCP.  Risks/bebefits/side effects discussed.  Tolerating well so far, no rash.  May decrease next visit now that pt is off her OCP.  3. Continue amitriptyline 75 mg q.h.s.  4. Continue buspirone  20 mg PRN targeting anxiety.  5. Follow up 1 month    Intervention/Counseling/Treatment Plan   Medication Management: Continue current medications. The risks and benefits of medication were discussed with the patient.      Return to Clinic: 1 month

## 2024-03-13 ENCOUNTER — OFFICE VISIT (OUTPATIENT)
Dept: OBSTETRICS AND GYNECOLOGY | Facility: CLINIC | Age: 24
End: 2024-03-13
Payer: COMMERCIAL

## 2024-03-13 VITALS
SYSTOLIC BLOOD PRESSURE: 128 MMHG | WEIGHT: 135.56 LBS | BODY MASS INDEX: 20.08 KG/M2 | HEIGHT: 69 IN | DIASTOLIC BLOOD PRESSURE: 84 MMHG | HEART RATE: 88 BPM

## 2024-03-13 DIAGNOSIS — Z30.431 IUD CHECK UP: Primary | ICD-10-CM

## 2024-03-13 DIAGNOSIS — N94.6 DYSMENORRHEA: ICD-10-CM

## 2024-03-13 PROCEDURE — 99999 PR PBB SHADOW E&M-EST. PATIENT-LVL III: CPT | Mod: PBBFAC,,, | Performed by: STUDENT IN AN ORGANIZED HEALTH CARE EDUCATION/TRAINING PROGRAM

## 2024-03-13 PROCEDURE — 3079F DIAST BP 80-89 MM HG: CPT | Mod: CPTII,S$GLB,, | Performed by: STUDENT IN AN ORGANIZED HEALTH CARE EDUCATION/TRAINING PROGRAM

## 2024-03-13 PROCEDURE — 1159F MED LIST DOCD IN RCRD: CPT | Mod: CPTII,S$GLB,, | Performed by: STUDENT IN AN ORGANIZED HEALTH CARE EDUCATION/TRAINING PROGRAM

## 2024-03-13 PROCEDURE — 99212 OFFICE O/P EST SF 10 MIN: CPT | Mod: S$GLB,,, | Performed by: STUDENT IN AN ORGANIZED HEALTH CARE EDUCATION/TRAINING PROGRAM

## 2024-03-13 PROCEDURE — 3074F SYST BP LT 130 MM HG: CPT | Mod: CPTII,S$GLB,, | Performed by: STUDENT IN AN ORGANIZED HEALTH CARE EDUCATION/TRAINING PROGRAM

## 2024-03-13 PROCEDURE — 3008F BODY MASS INDEX DOCD: CPT | Mod: CPTII,S$GLB,, | Performed by: STUDENT IN AN ORGANIZED HEALTH CARE EDUCATION/TRAINING PROGRAM

## 2024-03-13 PROCEDURE — 1160F RVW MEDS BY RX/DR IN RCRD: CPT | Mod: CPTII,S$GLB,, | Performed by: STUDENT IN AN ORGANIZED HEALTH CARE EDUCATION/TRAINING PROGRAM

## 2024-03-13 NOTE — PROGRESS NOTES
Chief Complaint: IUD String Check     HPI:      Sunshine Lund 23 y.o.   presents for follow up after IUD placement approximately 6 weeks ago. Today she reports first cycle after IUD placement was very painful. Now cramping and bleeding resolved. Has been sexually active with her partner without problems, although he did notice the strings. LMP .    Physical Exam:      PHYSICAL EXAM:  Vitals:    24 1429   BP: 128/84   Pulse: 88         APPEARANCE: Well nourished, well developed, in no acute distress.  ABDOMEN: Soft.  No tenderness or masses.    PELVIC: Normal external genitalia without lesions.  Normal hair distribution.  Adequate perineal body, normal urethral meatus.  Vagina moist and well rugated without lesions or discharge.  Cervix pink, without lesions, discharge or tenderness. IUD strings visible at the cervical os - trimmed from 3 cm to 2 cm per os. No significant cystocele or rectocele.  Bimanual exam shows uterus to be normal size, regular, mobile and nontender.  Adnexa without masses or tenderness.    EXTREMITIES: No edema.     Assessment/Plan:     IUD check up    RTC for annual.     Counseling:     Reviewed the length of IUD efficacy, in this case 5 years.   Reviewed barrier contraception to decrease risk of STDs.

## 2024-03-14 RX ORDER — NAPROXEN SODIUM 550 MG/1
550 TABLET ORAL 2 TIMES DAILY WITH MEALS
Qty: 30 TABLET | Refills: 2 | Status: SHIPPED | OUTPATIENT
Start: 2024-03-14

## 2024-03-26 ENCOUNTER — OFFICE VISIT (OUTPATIENT)
Dept: PSYCHIATRY | Facility: CLINIC | Age: 24
End: 2024-03-26
Payer: COMMERCIAL

## 2024-03-26 DIAGNOSIS — F41.0 GENERALIZED ANXIETY DISORDER WITH PANIC ATTACKS: ICD-10-CM

## 2024-03-26 DIAGNOSIS — F41.1 GENERALIZED ANXIETY DISORDER WITH PANIC ATTACKS: ICD-10-CM

## 2024-03-26 DIAGNOSIS — F31.81 BIPOLAR 2 DISORDER: Primary | ICD-10-CM

## 2024-03-26 DIAGNOSIS — F43.10 PTSD (POST-TRAUMATIC STRESS DISORDER): ICD-10-CM

## 2024-03-26 PROCEDURE — 1159F MED LIST DOCD IN RCRD: CPT | Mod: CPTII,95,, | Performed by: PHYSICIAN ASSISTANT

## 2024-03-26 PROCEDURE — 99214 OFFICE O/P EST MOD 30 MIN: CPT | Mod: 95,,, | Performed by: PHYSICIAN ASSISTANT

## 2024-03-26 PROCEDURE — G2211 COMPLEX E/M VISIT ADD ON: HCPCS | Mod: 95,,, | Performed by: PHYSICIAN ASSISTANT

## 2024-03-26 PROCEDURE — 1160F RVW MEDS BY RX/DR IN RCRD: CPT | Mod: CPTII,95,, | Performed by: PHYSICIAN ASSISTANT

## 2024-03-26 RX ORDER — CARIPRAZINE 1.5 MG/1
1.5 CAPSULE, GELATIN COATED ORAL DAILY
Qty: 90 CAPSULE | Refills: 0 | Status: SHIPPED | OUTPATIENT
Start: 2024-03-26 | End: 2024-04-26

## 2024-03-26 NOTE — PROGRESS NOTES
Outpatient Psychiatry Follow-Up Visit (HUI)    3/26/2024    Clinical Status of Patient:  Outpatient (Ambulatory)    The patient location is:  At home at address on record in [place]  The chief complaint leading to consultation is: mood disorder    Visit type: audiovisual    Face to Face time with patient: 15  25 minutes of total time spent on the encounter, which includes face to face time and non-face to face time preparing to see the patient (eg, review of tests), Obtaining and/or reviewing separately obtained history, Documenting clinical information in the electronic or other health record, Independently interpreting results (not separately reported) and communicating results to the patient/family/caregiver, or Care coordination (not separately reported).     Each patient to whom he or she provides medical services by telemedicine is:  (1) informed of the relationship between the physician and patient and the respective role of any other health care provider with respect to management of the patient; and (2) notified that he or she may decline to receive medical services by telemedicine and may withdraw from such care at any time.        Chief Complaint:  Sunshine Lund is a 23 y.o. female who presents today for follow-up of mood disorder and anxiety.  Met with patient.      Interval History and Content of Current Session:  Interim Events/Subjective Report/Content of Current Session:   Patient returns for follow up of bipolar II and anxiety. She is currently taking amitriptyline 75 mg at bedtime, buspirone 20 mg PRN, lamotrigine 300 mg daily, and aripiprazole 5 mg daily.  Pt states that she is currently on her period and having heavy cramping.  Appears dysthymic today.  She states that since getting Kyleena IUD she has had 2 periods, both with a lot of cramping and mood instability, but the second one has been a little better than the first.  She is on a higher dose of lamotrigine due to the interaction with  her OCP that she recently stopped, and we have been determining if she should decrease to the typical dose of 200 mg daily.  Today she states she is feeling depressed, and had one day of increased energy and hyperactivity prior to menses.  Denies SI.  Also c/o brain fog that she thinks might be related to the aripiprazole since increasing to 5 mg daily.  She also states she is feeling more anxious lately, buspirone helps when she takes it PRN, but is still getting anxious frequently.  Taking buspirone in the AM several days per week.        PSYCHIATRIC: Pertinant items are noted in the narrative.  CONSTITUTIONAL: No weight gain or loss.   MUSCULOSKELETAL: No pain or stiffness of the joints.  NEUROLOGIC: No weakness, sensory changes, seizures, confusion, memory loss, tremor or other abnormal movements.    Past Medical, Family and Social History: The patient's past medical, family and social history have been reviewed and updated as appropriate within the electronic medical record - see encounter notes.    Compliance: yes    Side effects: None    Risk Parameters:  Patient reports no suicidal ideation  Patient reports no homicidal ideation  Patient reports no self-injurious behavior  Patient reports no violent behavior    Exam (detailed: at least 9 elements; comprehensive: all 15 elements)   Constitutional  Vitals:  Most recent vital signs, dated less than 90 days prior to this appointment, were reviewed.   There were no vitals filed for this visit.     General:  unremarkable, age appropriate, casually dressed     Musculoskeletal  Muscle Strength/Tone:  not examined   Gait & Station:  not examined     Psychiatric  Speech:  no latency; no press   Mood & Affect:  steady, dysthymic  congruent and appropriate   Thought Process:  normal and logical   Associations:  intact   Thought Content:  normal, no suicidality, no homicidality, delusions, or paranoia   Insight:  intact   Judgement: behavior is adequate to circumstances    Orientation:  grossly intact   Memory: intact for content of interview   Language: grossly intact   Attention Span & Concentration:  able to focus   Fund of Knowledge:  intact and appropriate to age and level of education     Assessment and Diagnosis   Status/Progress: Based on the examination today, the patient's problem(s) is/are worsening.  New problems have not been presented today.   Diagnostic uncertainty and Lack of compliance are not complicating management of the primary condition.  There are no active rule-out diagnoses for this patient at this time.     General Impression: Bipolar II,AMANDA, and PTSD, feeling depressed and anxious, with brain fog possibly related to aripiprazole.  Will switch to Vraylar, which pt did well with years ago.  Will keep lamotrigine the same as she ha also had some mood instability around her menses.  Increasing buspirone to qAM instead of PRN.      ICD-10-CM ICD-9-CM   1. Bipolar 2 disorder  F31.81 296.89   2. Generalized anxiety disorder with panic attacks  F41.1 300.02    F41.0 300.01   3. PTSD (post-traumatic stress disorder)  F43.10 309.81       1. Switching aripiprazole to Vraylar.  Stop aripiprazole 5 mg and start Vraylar 1.5 mg daily.  I have explained the risks, benefits, and alternatives of medication treatment in detail. We specifically discussed risks and benefits of medication treatment, including but not limited to, headache, nausea, GI upset,  potential for metabolic syndrome: appetite stimulation, weight gain, dysregulation of blood sugar, diabetes, increase in lipids . Patient voices understanding and all questions have been answered. Patient agrees to treatment plan and medication trial.    2. Continue lamotrigine 300 mg daily in a single dose, higher dose necessary due to interaction with OCP.  Risks/bebefits/side effects discussed.  Tolerating well so far, no rash.  May decrease next visit now that pt is off her OCP.  3. Continue amitriptyline 75 mg  q.h.s.  4. Change buspirone  20 mg PRN to qAM targeting anxiety.  5. Follow up 1 month    Intervention/Counseling/Treatment Plan   Medication Management: Continue current medications. The risks and benefits of medication were discussed with the patient.      Return to Clinic: 1 month    Visit today included increased complexity associated with the care of the episodic problem anxiety and depression addressed and managing the longitudinal care of the patient due to the serious and/or complex managed problem(s) bipolar II, AMANDA, PTSD.

## 2024-04-17 ENCOUNTER — PATIENT MESSAGE (OUTPATIENT)
Dept: PSYCHIATRY | Facility: CLINIC | Age: 24
End: 2024-04-17
Payer: COMMERCIAL

## 2024-04-26 ENCOUNTER — OFFICE VISIT (OUTPATIENT)
Dept: PSYCHIATRY | Facility: CLINIC | Age: 24
End: 2024-04-26
Payer: COMMERCIAL

## 2024-04-26 DIAGNOSIS — F31.81 BIPOLAR 2 DISORDER: Primary | ICD-10-CM

## 2024-04-26 DIAGNOSIS — F43.10 PTSD (POST-TRAUMATIC STRESS DISORDER): ICD-10-CM

## 2024-04-26 DIAGNOSIS — F41.1 GENERALIZED ANXIETY DISORDER WITH PANIC ATTACKS: ICD-10-CM

## 2024-04-26 DIAGNOSIS — F41.0 GENERALIZED ANXIETY DISORDER WITH PANIC ATTACKS: ICD-10-CM

## 2024-04-26 PROCEDURE — 1160F RVW MEDS BY RX/DR IN RCRD: CPT | Mod: CPTII,95,, | Performed by: PHYSICIAN ASSISTANT

## 2024-04-26 PROCEDURE — 99214 OFFICE O/P EST MOD 30 MIN: CPT | Mod: 95,,, | Performed by: PHYSICIAN ASSISTANT

## 2024-04-26 PROCEDURE — 1159F MED LIST DOCD IN RCRD: CPT | Mod: CPTII,95,, | Performed by: PHYSICIAN ASSISTANT

## 2024-04-26 PROCEDURE — G2211 COMPLEX E/M VISIT ADD ON: HCPCS | Mod: 95,,, | Performed by: PHYSICIAN ASSISTANT

## 2024-04-26 RX ORDER — AMITRIPTYLINE HYDROCHLORIDE 75 MG/1
75 TABLET ORAL NIGHTLY
Qty: 90 TABLET | Refills: 1 | Status: SHIPPED | OUTPATIENT
Start: 2024-04-26 | End: 2024-10-23

## 2024-04-26 RX ORDER — CARIPRAZINE 3 MG/1
3 CAPSULE, GELATIN COATED ORAL DAILY
Qty: 90 CAPSULE | Refills: 0 | Status: SHIPPED | OUTPATIENT
Start: 2024-04-26 | End: 2024-07-25

## 2024-04-26 NOTE — PROGRESS NOTES
Outpatient Psychiatry Follow-Up Visit (HUI)    4/26/2024    Clinical Status of Patient:  Outpatient (Ambulatory)    The patient location is:  At home at address on record in [place]  The chief complaint leading to consultation is: mood disorder    Visit type: audiovisual    Face to Face time with patient: 15  25 minutes of total time spent on the encounter, which includes face to face time and non-face to face time preparing to see the patient (eg, review of tests), Obtaining and/or reviewing separately obtained history, Documenting clinical information in the electronic or other health record, Independently interpreting results (not separately reported) and communicating results to the patient/family/caregiver, or Care coordination (not separately reported).     Each patient to whom he or she provides medical services by telemedicine is:  (1) informed of the relationship between the physician and patient and the respective role of any other health care provider with respect to management of the patient; and (2) notified that he or she may decline to receive medical services by telemedicine and may withdraw from such care at any time.        Chief Complaint:  Sunshine Lund is a 23 y.o. female who presents today for follow-up of mood disorder and anxiety.  Met with patient.      Interval History and Content of Current Session:  Interim Events/Subjective Report/Content of Current Session:   Patient returns for follow up of bipolar II and anxiety. She is currently taking amitriptyline 75 mg at bedtime, buspirone 20 mg PRN, lamotrigine 300 mg daily, and Vraylar 1.5 mg daily, switched from aripiprazole last visit. Pt states that the brain fog has resolved and she is no longer anxious.  Does state that she is very excited about an upcoming cruise with her boyfriend's family, that she has been spending more money, and seems giddy today.  Denies side effects from the Vraylar.  Denies any decreased need for sleep, but is  waking up a couple of times per night now.      PSYCHIATRIC: Pertinant items are noted in the narrative.  CONSTITUTIONAL: No weight gain or loss.   MUSCULOSKELETAL: No pain or stiffness of the joints.  NEUROLOGIC: No weakness, sensory changes, seizures, confusion, memory loss, tremor or other abnormal movements.    Past Medical, Family and Social History: The patient's past medical, family and social history have been reviewed and updated as appropriate within the electronic medical record - see encounter notes.    Compliance: yes    Side effects: None    Risk Parameters:  Patient reports no suicidal ideation  Patient reports no homicidal ideation  Patient reports no self-injurious behavior  Patient reports no violent behavior    Exam (detailed: at least 9 elements; comprehensive: all 15 elements)   Constitutional  Vitals:  Most recent vital signs, dated less than 90 days prior to this appointment, were reviewed.   There were no vitals filed for this visit.     General:  unremarkable, age appropriate, casually dressed     Musculoskeletal  Muscle Strength/Tone:  not examined   Gait & Station:  not examined     Psychiatric  Speech:  no latency; no press   Mood & Affect:  happy  bright   Thought Process:  normal and logical   Associations:  intact   Thought Content:  normal, no suicidality, no homicidality, delusions, or paranoia   Insight:  intact   Judgement: behavior is adequate to circumstances   Orientation:  grossly intact   Memory: intact for content of interview   Language: grossly intact   Attention Span & Concentration:  able to focus   Fund of Knowledge:  intact and appropriate to age and level of education     Assessment and Diagnosis   Status/Progress: Based on the examination today, the patient's problem(s) is/are improved.  New problems have been presented today.   Diagnostic uncertainty and Lack of compliance are not complicating management of the primary condition.  There are no active rule-out  diagnoses for this patient at this time.     General Impression: Bipolar II,AMANDA, and PTSD, now possibly in a hypomanic episode after switching aripiprazole 5 mg to Vraylar 1.5 mg daily.  Will increase Vraylar.      ICD-10-CM ICD-9-CM   1. Bipolar 2 disorder  F31.81 296.89   2. Generalized anxiety disorder with panic attacks  F41.1 300.02    F41.0 300.01   3. PTSD (post-traumatic stress disorder)  F43.10 309.81         1. Increase Vraylar to 3 mg daily.  I have explained the risks, benefits, and alternatives of medication treatment in detail. We specifically discussed risks and benefits of medication treatment, including but not limited to, headache, nausea, GI upset,  potential for metabolic syndrome: appetite stimulation, weight gain, dysregulation of blood sugar, diabetes, increase in lipids . Patient voices understanding and all questions have been answered. Patient agrees to treatment plan and medication trial.    2. Continue lamotrigine 300 mg daily in a single dose, higher dose necessary due to interaction with OCP.  Risks/bebefits/side effects discussed.  Tolerating well so far, no rash.  May decrease next visit now that pt is off her OCP.  3. Continue amitriptyline 75 mg q.h.s.  4. Change buspirone  20 mg PRN to qAM targeting anxiety.  5. Follow up 1 month    Intervention/Counseling/Treatment Plan   Medication Management: Continue current medications. The risks and benefits of medication were discussed with the patient.      Return to Clinic: 1 month    Visit today included increased complexity associated with the care of the episodic problem anxiety and depression addressed and managing the longitudinal care of the patient due to the serious and/or complex managed problem(s) bipolar II, AMANDA, PTSD.

## 2024-05-27 ENCOUNTER — OFFICE VISIT (OUTPATIENT)
Dept: PSYCHIATRY | Facility: CLINIC | Age: 24
End: 2024-05-27
Payer: COMMERCIAL

## 2024-05-27 DIAGNOSIS — F41.1 GENERALIZED ANXIETY DISORDER WITH PANIC ATTACKS: ICD-10-CM

## 2024-05-27 DIAGNOSIS — F43.10 PTSD (POST-TRAUMATIC STRESS DISORDER): ICD-10-CM

## 2024-05-27 DIAGNOSIS — F41.0 GENERALIZED ANXIETY DISORDER WITH PANIC ATTACKS: ICD-10-CM

## 2024-05-27 DIAGNOSIS — F31.81 BIPOLAR 2 DISORDER: Primary | ICD-10-CM

## 2024-05-27 PROCEDURE — 1159F MED LIST DOCD IN RCRD: CPT | Mod: CPTII,95,, | Performed by: PHYSICIAN ASSISTANT

## 2024-05-27 PROCEDURE — 1160F RVW MEDS BY RX/DR IN RCRD: CPT | Mod: CPTII,95,, | Performed by: PHYSICIAN ASSISTANT

## 2024-05-27 PROCEDURE — 99214 OFFICE O/P EST MOD 30 MIN: CPT | Mod: 95,,, | Performed by: PHYSICIAN ASSISTANT

## 2024-05-27 PROCEDURE — G2211 COMPLEX E/M VISIT ADD ON: HCPCS | Mod: 95,,, | Performed by: PHYSICIAN ASSISTANT

## 2024-05-27 NOTE — PROGRESS NOTES
Outpatient Psychiatry Follow-Up Visit (HUI)    5/27/2024    Clinical Status of Patient:  Outpatient (Ambulatory)    The patient location is:  In her car outside her work in [place]  The chief complaint leading to consultation is: mood disorder    Visit type: audiovisual    Face to Face time with patient: 5  15 minutes of total time spent on the encounter, which includes face to face time and non-face to face time preparing to see the patient (eg, review of tests), Obtaining and/or reviewing separately obtained history, Documenting clinical information in the electronic or other health record, Independently interpreting results (not separately reported) and communicating results to the patient/family/caregiver, or Care coordination (not separately reported).     Each patient to whom he or she provides medical services by telemedicine is:  (1) informed of the relationship between the physician and patient and the respective role of any other health care provider with respect to management of the patient; and (2) notified that he or she may decline to receive medical services by telemedicine and may withdraw from such care at any time.        Chief Complaint:  Sunshine Lund is a 23 y.o. female who presents today for follow-up of mood disorder and anxiety.  Met with patient.      Interval History and Content of Current Session:  Interim Events/Subjective Report/Content of Current Session:   Patient returns for follow up of bipolar II and anxiety. She is currently taking amitriptyline 75 mg at bedtime, buspirone 20 mg PRN, lamotrigine 300 mg daily, and Vraylar 3 mg daily, increased last visit.  Pt reports she has been doing well, now having mostly good days and only bad moments, not whole days.  Denies side effects from increasing Vraylar.  Feels she is stable now.      PSYCHIATRIC: Pertinant items are noted in the narrative.  CONSTITUTIONAL: No weight gain or loss.   MUSCULOSKELETAL: No pain or stiffness of the  joints.  NEUROLOGIC: No weakness, sensory changes, seizures, confusion, memory loss, tremor or other abnormal movements.    Past Medical, Family and Social History: The patient's past medical, family and social history have been reviewed and updated as appropriate within the electronic medical record - see encounter notes.    Compliance: yes    Side effects: None    Risk Parameters:  Patient reports no suicidal ideation  Patient reports no homicidal ideation  Patient reports no self-injurious behavior  Patient reports no violent behavior    Exam (detailed: at least 9 elements; comprehensive: all 15 elements)   Constitutional  Vitals:  Most recent vital signs, dated less than 90 days prior to this appointment, were reviewed.   There were no vitals filed for this visit.     General:  unremarkable, age appropriate, casually dressed     Musculoskeletal  Muscle Strength/Tone:  not examined   Gait & Station:  not examined     Psychiatric  Speech:  no latency; no press   Mood & Affect:  steady, happy  congruent and appropriate   Thought Process:  normal and logical   Associations:  intact   Thought Content:  normal, no suicidality, no homicidality, delusions, or paranoia   Insight:  intact   Judgement: behavior is adequate to circumstances   Orientation:  grossly intact   Memory: intact for content of interview   Language: grossly intact   Attention Span & Concentration:  able to focus   Fund of Knowledge:  intact and appropriate to age and level of education     Assessment and Diagnosis   Status/Progress: Based on the examination today, the patient's problem(s) is/are improved and well controlled.  New problems have not been presented today.   Diagnostic uncertainty and Lack of compliance are not complicating management of the primary condition.  There are no active rule-out diagnoses for this patient at this time.     General Impression: Bipolar II,AMANDA, and PTSD, now well since increasing Vraylar.      ICD-10-CM ICD-9-CM    1. Bipolar 2 disorder  F31.81 296.89   2. Generalized anxiety disorder with panic attacks  F41.1 300.02    F41.0 300.01   3. PTSD (post-traumatic stress disorder)  F43.10 309.81       1. Continue Vraylar 3 mg daily.  I have explained the risks, benefits, and alternatives of medication treatment in detail. We specifically discussed risks and benefits of medication treatment, including but not limited to, headache, nausea, GI upset,  potential for metabolic syndrome: appetite stimulation, weight gain, dysregulation of blood sugar, diabetes, increase in lipids . Patient voices understanding and all questions have been answered. Patient agrees to treatment plan and medication trial.    2. Continue lamotrigine 300 mg daily in a single dose, higher dose necessary due to interaction with OCP.  Risks/bebefits/side effects discussed.  Tolerating well so far, no rash.    3. Continue amitriptyline 75 mg q.h.s.  4. Continue buspirone  20 mg qAM targeting anxiety.  5. Follow up 2 months    Intervention/Counseling/Treatment Plan   Medication Management: Continue current medications. The risks and benefits of medication were discussed with the patient.      Return to Clinic: 2 months    Visit today included increased complexity associated with the care of the episodic problem anxiety and depression addressed and managing the longitudinal care of the patient due to the serious and/or complex managed problem(s) bipolar II, AMANDA, PTSD.

## 2024-05-31 ENCOUNTER — OFFICE VISIT (OUTPATIENT)
Dept: INTERNAL MEDICINE | Facility: CLINIC | Age: 24
End: 2024-05-31
Payer: COMMERCIAL

## 2024-05-31 VITALS
DIASTOLIC BLOOD PRESSURE: 76 MMHG | HEART RATE: 96 BPM | WEIGHT: 148.13 LBS | BODY MASS INDEX: 21.94 KG/M2 | HEIGHT: 69 IN | SYSTOLIC BLOOD PRESSURE: 116 MMHG | OXYGEN SATURATION: 97 %

## 2024-05-31 DIAGNOSIS — L74.510 HYPERHIDROSIS OF AXILLA: Primary | ICD-10-CM

## 2024-05-31 DIAGNOSIS — Z23 NEED FOR HPV VACCINATION: ICD-10-CM

## 2024-05-31 PROCEDURE — 3008F BODY MASS INDEX DOCD: CPT | Mod: CPTII,S$GLB,, | Performed by: PHYSICIAN ASSISTANT

## 2024-05-31 PROCEDURE — 3074F SYST BP LT 130 MM HG: CPT | Mod: CPTII,S$GLB,, | Performed by: PHYSICIAN ASSISTANT

## 2024-05-31 PROCEDURE — 1159F MED LIST DOCD IN RCRD: CPT | Mod: CPTII,S$GLB,, | Performed by: PHYSICIAN ASSISTANT

## 2024-05-31 PROCEDURE — 1160F RVW MEDS BY RX/DR IN RCRD: CPT | Mod: CPTII,S$GLB,, | Performed by: PHYSICIAN ASSISTANT

## 2024-05-31 PROCEDURE — 99999 PR PBB SHADOW E&M-EST. PATIENT-LVL IV: CPT | Mod: PBBFAC,,, | Performed by: PHYSICIAN ASSISTANT

## 2024-05-31 PROCEDURE — 99214 OFFICE O/P EST MOD 30 MIN: CPT | Mod: S$GLB,,, | Performed by: PHYSICIAN ASSISTANT

## 2024-05-31 PROCEDURE — 3078F DIAST BP <80 MM HG: CPT | Mod: CPTII,S$GLB,, | Performed by: PHYSICIAN ASSISTANT

## 2024-05-31 NOTE — PROGRESS NOTES
"Subjective     Patient ID: Sunshine Lund is a 23 y.o. female.    Chief Complaint: Immunizations    HPI    Established pt of Brown Valdes MD     She would like to get next HPV vaccine, also due for tetanus    Inquires about med for hyperhidrosis, issues for most of her life, mostly of axilla, but also under breast, hands.   She has tried different deodorant without much improvement    Past Medical History:   Diagnosis Date    Bipolar disorder, unspecified      Social History     Tobacco Use    Smoking status: Every Day     Types: Vaping w/o nicotine    Smokeless tobacco: Never   Substance Use Topics    Alcohol use: Not Currently    Drug use: Yes     Types: Marijuana     Comment: daily     Review of patient's allergies indicates:  No Known Allergies      Review of Systems   Constitutional:  Negative for chills, fatigue, fever and unexpected weight change.   Integumentary:  Negative for rash.   Hematological:  Negative for adenopathy.          Objective  /76 (BP Location: Left arm, Patient Position: Sitting, BP Method: Medium (Manual))   Pulse 96   Ht 5' 9" (1.753 m)   Wt 67.2 kg (148 lb 2.4 oz)   SpO2 97%   BMI 21.88 kg/m²       Physical Exam  Constitutional:       General: She is not in acute distress.     Appearance: She is not ill-appearing.   Cardiovascular:      Rate and Rhythm: Normal rate and regular rhythm.   Pulmonary:      Effort: Pulmonary effort is normal. No respiratory distress.      Breath sounds: No wheezing.   Musculoskeletal:      Comments: sweating noted to axilla, with sweat marking on clothing   Lymphadenopathy:      Cervical: No cervical adenopathy.   Skin:     Findings: No rash.   Neurological:      Mental Status: She is alert.            Assessment and Plan     1. Hyperhidrosis of axilla (chronic, trial of topical Qbrexza)  -     glycopyrronium tosylate 2.4 % Towl; Apply 1 each topically Daily.  Dispense: 30 each; Refill: 6    2. Need for HPV vaccination  Today at immunization " station.         Follow up if symptoms worsen or fail to improve.

## 2024-06-10 ENCOUNTER — PATIENT MESSAGE (OUTPATIENT)
Dept: INTERNAL MEDICINE | Facility: CLINIC | Age: 24
End: 2024-06-10
Payer: COMMERCIAL

## 2024-07-22 RX ORDER — CARIPRAZINE 3 MG/1
3 CAPSULE, GELATIN COATED ORAL DAILY
Qty: 90 CAPSULE | Refills: 0 | Status: SHIPPED | OUTPATIENT
Start: 2024-07-22 | End: 2024-10-20

## 2024-07-26 ENCOUNTER — PATIENT MESSAGE (OUTPATIENT)
Dept: PSYCHIATRY | Facility: CLINIC | Age: 24
End: 2024-07-26

## 2024-07-29 ENCOUNTER — OFFICE VISIT (OUTPATIENT)
Dept: PSYCHIATRY | Facility: CLINIC | Age: 24
End: 2024-07-29
Payer: COMMERCIAL

## 2024-07-29 DIAGNOSIS — F31.81 BIPOLAR 2 DISORDER: Primary | ICD-10-CM

## 2024-07-29 DIAGNOSIS — F41.0 GENERALIZED ANXIETY DISORDER WITH PANIC ATTACKS: ICD-10-CM

## 2024-07-29 DIAGNOSIS — F41.1 GENERALIZED ANXIETY DISORDER WITH PANIC ATTACKS: ICD-10-CM

## 2024-07-29 DIAGNOSIS — F43.10 PTSD (POST-TRAUMATIC STRESS DISORDER): ICD-10-CM

## 2024-07-29 PROCEDURE — 1159F MED LIST DOCD IN RCRD: CPT | Mod: CPTII,95,, | Performed by: PHYSICIAN ASSISTANT

## 2024-07-29 PROCEDURE — 1160F RVW MEDS BY RX/DR IN RCRD: CPT | Mod: CPTII,95,, | Performed by: PHYSICIAN ASSISTANT

## 2024-07-29 PROCEDURE — 99214 OFFICE O/P EST MOD 30 MIN: CPT | Mod: 95,,, | Performed by: PHYSICIAN ASSISTANT

## 2024-07-29 PROCEDURE — G2211 COMPLEX E/M VISIT ADD ON: HCPCS | Mod: 95,,, | Performed by: PHYSICIAN ASSISTANT

## 2024-07-29 NOTE — PROGRESS NOTES
Outpatient Psychiatry Follow-Up Visit (HUI)    7/29/2024    Clinical Status of Patient:  Outpatient (Ambulatory)    The patient location is:  In her car outside her work in [place]  The chief complaint leading to consultation is: mood disorder    Visit type: audiovisual    Face to Face time with patient: 7  15 minutes of total time spent on the encounter, which includes face to face time and non-face to face time preparing to see the patient (eg, review of tests), Obtaining and/or reviewing separately obtained history, Documenting clinical information in the electronic or other health record, Independently interpreting results (not separately reported) and communicating results to the patient/family/caregiver, or Care coordination (not separately reported).     Each patient to whom he or she provides medical services by telemedicine is:  (1) informed of the relationship between the physician and patient and the respective role of any other health care provider with respect to management of the patient; and (2) notified that he or she may decline to receive medical services by telemedicine and may withdraw from such care at any time.        Chief Complaint:  Sunshine Lund is a 24 y.o. female who presents today for follow-up of mood disorder and anxiety.  Met with patient.      Interval History and Content of Current Session:  Interim Events/Subjective Report/Content of Current Session:   Patient returns for follow up of bipolar II and anxiety. She is currently taking amitriptyline 75 mg at bedtime, buspirone 20 mg PRN, lamotrigine 150 BID, and Vraylar 3 mg daily.  She states she is doing OK, but still feels somewhat depressed most days.  States she is keeping herself from spiraling by doing her hobby.  Also reports some mood instability.    PSYCHIATRIC: Pertinant items are noted in the narrative.    Past Medical, Family and Social History: The patient's past medical, family and social history have been reviewed  and updated as appropriate within the electronic medical record - see encounter notes.    Compliance: yes    Side effects: None    Risk Parameters:  Patient reports no suicidal ideation  Patient reports no homicidal ideation  Patient reports no self-injurious behavior  Patient reports no violent behavior    Exam (detailed: at least 9 elements; comprehensive: all 15 elements)   Constitutional  Vitals:  Most recent vital signs, dated less than 90 days prior to this appointment, were reviewed.   There were no vitals filed for this visit.     General:  unremarkable, age appropriate, casually dressed     Musculoskeletal  Muscle Strength/Tone:  not examined   Gait & Station:  not examined     Psychiatric  Speech:  no latency; no press   Mood & Affect:  steady  congruent and appropriate   Thought Process:  normal and logical   Associations:  intact   Thought Content:  normal, no suicidality, no homicidality, delusions, or paranoia   Insight:  intact   Judgement: behavior is adequate to circumstances   Orientation:  grossly intact   Memory: intact for content of interview   Language: grossly intact   Attention Span & Concentration:  able to focus   Fund of Knowledge:  intact and appropriate to age and level of education     Assessment and Diagnosis   Status/Progress: Based on the examination today, the patient's problem(s) is/are improved and well controlled.  New problems have not been presented today.   Diagnostic uncertainty and Lack of compliance are not complicating management of the primary condition.  There are no active rule-out diagnoses for this patient at this time.     General Impression: Bipolar II,AMANDA, and PTSD, still feeling somewhat depressed and unstable.  Will further increase Vraylar.      ICD-10-CM ICD-9-CM   1. Bipolar 2 disorder  F31.81 296.89   2. Generalized anxiety disorder with panic attacks  F41.1 300.02    F41.0 300.01   3. PTSD (post-traumatic stress disorder)  F43.10 309.81         1. Increase  Vraylar to 4.5 mg daily.  Risks/bebefits/side effects discussed.  2. Continue lamotrigine 150 mg BID, higher dose necessary due to interaction with OCP.  Risks/bebefits/side effects discussed.  Tolerating well so far, no rash.    3. Continue amitriptyline 75 mg q.h.s.  4. Continue buspirone  20 mg qAM targeting anxiety.  5. Follow up 1 month    Intervention/Counseling/Treatment Plan   Medication Management: Continue current medications. The risks and benefits of medication were discussed with the patient.      Return to Clinic: 1 month    Visit today included increased complexity associated with the care of the episodic problem anxiety and depression addressed and managing the longitudinal care of the patient due to the serious and/or complex managed problem(s) bipolar II, AMANDA, PTSD.

## 2024-08-29 ENCOUNTER — OFFICE VISIT (OUTPATIENT)
Dept: PSYCHIATRY | Facility: CLINIC | Age: 24
End: 2024-08-29
Payer: COMMERCIAL

## 2024-08-29 DIAGNOSIS — F31.81 BIPOLAR 2 DISORDER: Primary | ICD-10-CM

## 2024-08-29 DIAGNOSIS — F41.0 GENERALIZED ANXIETY DISORDER WITH PANIC ATTACKS: ICD-10-CM

## 2024-08-29 DIAGNOSIS — F41.1 GENERALIZED ANXIETY DISORDER WITH PANIC ATTACKS: ICD-10-CM

## 2024-08-29 DIAGNOSIS — F43.10 PTSD (POST-TRAUMATIC STRESS DISORDER): ICD-10-CM

## 2024-08-29 PROCEDURE — 1159F MED LIST DOCD IN RCRD: CPT | Mod: CPTII,95,, | Performed by: PHYSICIAN ASSISTANT

## 2024-08-29 PROCEDURE — 99214 OFFICE O/P EST MOD 30 MIN: CPT | Mod: 95,,, | Performed by: PHYSICIAN ASSISTANT

## 2024-08-29 PROCEDURE — 1160F RVW MEDS BY RX/DR IN RCRD: CPT | Mod: CPTII,95,, | Performed by: PHYSICIAN ASSISTANT

## 2024-08-29 PROCEDURE — G2211 COMPLEX E/M VISIT ADD ON: HCPCS | Mod: 95,,, | Performed by: PHYSICIAN ASSISTANT

## 2024-08-29 NOTE — PROGRESS NOTES
Outpatient Psychiatry Follow-Up Visit (HUI)    8/29/2024    Clinical Status of Patient:  Outpatient (Ambulatory)    The patient location is:  In her car outside her work in [place]  The chief complaint leading to consultation is: mood disorder    Visit type: audiovisual    Face to Face time with patient: 12  20 minutes of total time spent on the encounter, which includes face to face time and non-face to face time preparing to see the patient (eg, review of tests), Obtaining and/or reviewing separately obtained history, Documenting clinical information in the electronic or other health record, Independently interpreting results (not separately reported) and communicating results to the patient/family/caregiver, or Care coordination (not separately reported).     Each patient to whom he or she provides medical services by telemedicine is:  (1) informed of the relationship between the physician and patient and the respective role of any other health care provider with respect to management of the patient; and (2) notified that he or she may decline to receive medical services by telemedicine and may withdraw from such care at any time.        Chief Complaint:  Sunshine Lund is a 24 y.o. female who presents today for follow-up of mood disorder and anxiety.  Met with patient.      Interval History and Content of Current Session:  Interim Events/Subjective Report/Content of Current Session:   Patient returns for follow up of bipolar II and anxiety. She is currently taking amitriptyline 75 mg at bedtime, buspirone 20 mg PRN, lamotrigine 150 BID, and Vraylar 4.5 mg daily, increased last visit.  She states that her mood is now stable but she is feeling somewhat numb and has a bit of shakiness.  She is wondering if she can come off some or all of her meds.  States therapy is going very well.  Sleeping well.  Is unsure if the buspirone is helping much.      PSYCHIATRIC: Pertinant items are noted in the narrative.    Past  Medical, Family and Social History: The patient's past medical, family and social history have been reviewed and updated as appropriate within the electronic medical record - see encounter notes.    Compliance: yes    Side effects: see above    Risk Parameters:  Patient reports no suicidal ideation  Patient reports no homicidal ideation  Patient reports no self-injurious behavior  Patient reports no violent behavior    Exam (detailed: at least 9 elements; comprehensive: all 15 elements)   Constitutional  Vitals:  Most recent vital signs, dated less than 90 days prior to this appointment, were reviewed.   There were no vitals filed for this visit.     General:  unremarkable, age appropriate, casually dressed     Musculoskeletal  Muscle Strength/Tone:  not examined   Gait & Station:  not examined     Psychiatric  Speech:  no latency; no press   Mood & Affect:  steady  congruent and appropriate   Thought Process:  normal and logical   Associations:  intact   Thought Content:  normal, no suicidality, no homicidality, delusions, or paranoia   Insight:  intact   Judgement: behavior is adequate to circumstances   Orientation:  grossly intact   Memory: intact for content of interview   Language: grossly intact   Attention Span & Concentration:  able to focus   Fund of Knowledge:  intact and appropriate to age and level of education     Assessment and Diagnosis   Status/Progress: Based on the examination today, the patient's problem(s) is/are improved and well controlled.  New problems have not been presented today.   Diagnostic uncertainty and Lack of compliance are not complicating management of the primary condition.  There are no active rule-out diagnoses for this patient at this time.     General Impression: Bipolar II,AMANDA, and PTSD, now stable with increase in Vraylar but feeling numb and a little shaky.  Will try decreasing amitriptyline and/or buspirone.  Will hopefully be able to sleep OK without the amitriptyline  now that her mood is stabilized.  Discussed with pt that she is at a high risk for mood episode relapse if she stopps her mood stabilizers, but that after she is stable for several months we can talk about decreasing or stopping one or the other.        ICD-10-CM ICD-9-CM   1. Bipolar 2 disorder  F31.81 296.89   2. Generalized anxiety disorder with panic attacks  F41.1 300.02    F41.0 300.01   3. PTSD (post-traumatic stress disorder)  F43.10 309.81     1. Continue Vraylar 4.5 mg daily.  Risks/bebefits/side effects discussed.  2. Continue lamotrigine 150 mg BID, higher dose necessary due to interaction with OCP.  Risks/bebefits/side effects discussed.  Tolerating well so far, no rash.    3. Decrease amitriptyline by breaking 75 mg tablet in half- can discontinue if still sleeping OK.  4. Can try stopping buspirone.  5. Follow up 1 month    Intervention/Counseling/Treatment Plan   Medication Management: Continue current medications. The risks and benefits of medication were discussed with the patient.      Return to Clinic: 1 month    Visit today included increased complexity associated with the care of the episodic problem anxiety and depression addressed and managing the longitudinal care of the patient due to the serious and/or complex managed problem(s) bipolar II, AMANDA, PTSD.

## 2024-09-05 ENCOUNTER — OFFICE VISIT (OUTPATIENT)
Dept: INTERNAL MEDICINE | Facility: CLINIC | Age: 24
End: 2024-09-05
Payer: COMMERCIAL

## 2024-09-05 ENCOUNTER — LAB VISIT (OUTPATIENT)
Dept: LAB | Facility: HOSPITAL | Age: 24
End: 2024-09-05
Payer: COMMERCIAL

## 2024-09-05 VITALS
HEIGHT: 69 IN | SYSTOLIC BLOOD PRESSURE: 114 MMHG | HEART RATE: 100 BPM | BODY MASS INDEX: 21.94 KG/M2 | OXYGEN SATURATION: 98 % | WEIGHT: 148.13 LBS | DIASTOLIC BLOOD PRESSURE: 84 MMHG

## 2024-09-05 DIAGNOSIS — Z00.00 ANNUAL PHYSICAL EXAM: ICD-10-CM

## 2024-09-05 DIAGNOSIS — Z00.00 ANNUAL PHYSICAL EXAM: Primary | ICD-10-CM

## 2024-09-05 LAB
ALBUMIN SERPL BCP-MCNC: 4.1 G/DL (ref 3.5–5.2)
ALP SERPL-CCNC: 63 U/L (ref 55–135)
ALT SERPL W/O P-5'-P-CCNC: 7 U/L (ref 10–44)
ANION GAP SERPL CALC-SCNC: 7 MMOL/L (ref 8–16)
AST SERPL-CCNC: 11 U/L (ref 10–40)
BASOPHILS # BLD AUTO: 0.03 K/UL (ref 0–0.2)
BASOPHILS NFR BLD: 0.6 % (ref 0–1.9)
BILIRUB SERPL-MCNC: 0.4 MG/DL (ref 0.1–1)
BUN SERPL-MCNC: 12 MG/DL (ref 6–20)
CALCIUM SERPL-MCNC: 9.6 MG/DL (ref 8.7–10.5)
CHLORIDE SERPL-SCNC: 108 MMOL/L (ref 95–110)
CHOLEST SERPL-MCNC: 171 MG/DL (ref 120–199)
CHOLEST/HDLC SERPL: 3 {RATIO} (ref 2–5)
CO2 SERPL-SCNC: 24 MMOL/L (ref 23–29)
CREAT SERPL-MCNC: 1 MG/DL (ref 0.5–1.4)
DIFFERENTIAL METHOD BLD: ABNORMAL
EOSINOPHIL # BLD AUTO: 0.1 K/UL (ref 0–0.5)
EOSINOPHIL NFR BLD: 1 % (ref 0–8)
ERYTHROCYTE [DISTWIDTH] IN BLOOD BY AUTOMATED COUNT: 13.4 % (ref 11.5–14.5)
EST. GFR  (NO RACE VARIABLE): >60 ML/MIN/1.73 M^2
ESTIMATED AVG GLUCOSE: 94 MG/DL (ref 68–131)
GLUCOSE SERPL-MCNC: 95 MG/DL (ref 70–110)
HBA1C MFR BLD: 4.9 % (ref 4–5.6)
HCT VFR BLD AUTO: 37 % (ref 37–48.5)
HDLC SERPL-MCNC: 57 MG/DL (ref 40–75)
HDLC SERPL: 33.3 % (ref 20–50)
HGB BLD-MCNC: 11.9 G/DL (ref 12–16)
IMM GRANULOCYTES # BLD AUTO: 0.01 K/UL (ref 0–0.04)
IMM GRANULOCYTES NFR BLD AUTO: 0.2 % (ref 0–0.5)
LDLC SERPL CALC-MCNC: 103.4 MG/DL (ref 63–159)
LYMPHOCYTES # BLD AUTO: 1 K/UL (ref 1–4.8)
LYMPHOCYTES NFR BLD: 18.3 % (ref 18–48)
MCH RBC QN AUTO: 29.5 PG (ref 27–31)
MCHC RBC AUTO-ENTMCNC: 32.2 G/DL (ref 32–36)
MCV RBC AUTO: 92 FL (ref 82–98)
MONOCYTES # BLD AUTO: 0.4 K/UL (ref 0.3–1)
MONOCYTES NFR BLD: 6.7 % (ref 4–15)
NEUTROPHILS # BLD AUTO: 3.9 K/UL (ref 1.8–7.7)
NEUTROPHILS NFR BLD: 73.2 % (ref 38–73)
NONHDLC SERPL-MCNC: 114 MG/DL
NRBC BLD-RTO: 0 /100 WBC
PLATELET # BLD AUTO: 213 K/UL (ref 150–450)
PMV BLD AUTO: 10.9 FL (ref 9.2–12.9)
POTASSIUM SERPL-SCNC: 4.5 MMOL/L (ref 3.5–5.1)
PROT SERPL-MCNC: 7.4 G/DL (ref 6–8.4)
RBC # BLD AUTO: 4.04 M/UL (ref 4–5.4)
SODIUM SERPL-SCNC: 139 MMOL/L (ref 136–145)
TRIGL SERPL-MCNC: 53 MG/DL (ref 30–150)
TSH SERPL DL<=0.005 MIU/L-ACNC: 0.53 UIU/ML (ref 0.4–4)
WBC # BLD AUTO: 5.25 K/UL (ref 3.9–12.7)

## 2024-09-05 PROCEDURE — 1159F MED LIST DOCD IN RCRD: CPT | Mod: CPTII,S$GLB,, | Performed by: PHYSICIAN ASSISTANT

## 2024-09-05 PROCEDURE — 83036 HEMOGLOBIN GLYCOSYLATED A1C: CPT | Performed by: PHYSICIAN ASSISTANT

## 2024-09-05 PROCEDURE — 85025 COMPLETE CBC W/AUTO DIFF WBC: CPT | Performed by: PHYSICIAN ASSISTANT

## 2024-09-05 PROCEDURE — 3079F DIAST BP 80-89 MM HG: CPT | Mod: CPTII,S$GLB,, | Performed by: PHYSICIAN ASSISTANT

## 2024-09-05 PROCEDURE — 84443 ASSAY THYROID STIM HORMONE: CPT | Performed by: PHYSICIAN ASSISTANT

## 2024-09-05 PROCEDURE — 99395 PREV VISIT EST AGE 18-39: CPT | Mod: S$GLB,,, | Performed by: PHYSICIAN ASSISTANT

## 2024-09-05 PROCEDURE — 3074F SYST BP LT 130 MM HG: CPT | Mod: CPTII,S$GLB,, | Performed by: PHYSICIAN ASSISTANT

## 2024-09-05 PROCEDURE — 1160F RVW MEDS BY RX/DR IN RCRD: CPT | Mod: CPTII,S$GLB,, | Performed by: PHYSICIAN ASSISTANT

## 2024-09-05 PROCEDURE — 3008F BODY MASS INDEX DOCD: CPT | Mod: CPTII,S$GLB,, | Performed by: PHYSICIAN ASSISTANT

## 2024-09-05 PROCEDURE — 80061 LIPID PANEL: CPT | Performed by: PHYSICIAN ASSISTANT

## 2024-09-05 PROCEDURE — 80053 COMPREHEN METABOLIC PANEL: CPT | Performed by: PHYSICIAN ASSISTANT

## 2024-09-05 PROCEDURE — 99999 PR PBB SHADOW E&M-EST. PATIENT-LVL IV: CPT | Mod: PBBFAC,,, | Performed by: PHYSICIAN ASSISTANT

## 2024-09-05 NOTE — PROGRESS NOTES
"Subjective     Patient ID: Sunshine Lund is a 24 y.o. female.    Chief Complaint: Annual Exam and Immunizations    HPI    Established pt of Brown Valdes MD (new to me)      Here for annual exam.     Doing well. No acute complaints.     Desires 3rd dose of HPV vaccine.    Past Medical History:   Diagnosis Date    Bipolar disorder, unspecified      Social History     Tobacco Use    Smoking status: Every Day     Types: Vaping w/o nicotine    Smokeless tobacco: Never   Substance Use Topics    Alcohol use: Not Currently    Drug use: Yes     Types: Marijuana     Comment: daily     Review of patient's allergies indicates:  No Known Allergies    Review of Systems   Constitutional:  Negative for chills, fever and unexpected weight change.   Respiratory:  Negative for cough and shortness of breath.    Cardiovascular:  Negative for chest pain and leg swelling.   Gastrointestinal:  Negative for abdominal pain, nausea and vomiting.   Integumentary:  Negative for rash.   Neurological:  Negative for weakness and headaches.          Objective  /84 (BP Location: Left arm, Patient Position: Sitting, BP Method: Medium (Manual))   Pulse 100   Ht 5' 9" (1.753 m)   Wt 67.2 kg (148 lb 2.4 oz)   SpO2 98%   BMI 21.88 kg/m²       Physical Exam  Vitals reviewed.   Constitutional:       General: She is not in acute distress.     Appearance: She is well-developed. She is not ill-appearing.   HENT:      Head: Normocephalic and atraumatic.      Right Ear: Tympanic membrane, ear canal and external ear normal.      Left Ear: Tympanic membrane, ear canal and external ear normal.   Cardiovascular:      Rate and Rhythm: Normal rate and regular rhythm.      Heart sounds: No murmur heard.  Pulmonary:      Effort: Pulmonary effort is normal.      Breath sounds: Normal breath sounds. No wheezing or rales.   Abdominal:      General: Bowel sounds are normal.      Palpations: Abdomen is soft.   Musculoskeletal:      Right lower leg: No edema.     "  Left lower leg: No edema.   Lymphadenopathy:      Cervical: No cervical adenopathy.   Skin:     General: Skin is warm and dry.      Findings: No rash.   Neurological:      Mental Status: She is alert.   Psychiatric:         Mood and Affect: Mood normal.            Assessment and Plan     1. Annual physical exam  HM reviewed and updated  -     CBC Auto Differential; Future; Expected date: 09/05/2024  -     Comprehensive Metabolic Panel; Future; Expected date: 09/05/2024  -     TSH; Future; Expected date: 09/05/2024  -     Lipid Panel; Future; Expected date: 09/05/2024  -     Hemoglobin A1C; Future; Expected date: 09/05/2024      RTC in 1 yr for next annual or sooner if needed.     Sadaf Bueno PA-C

## 2024-09-11 RX ORDER — LAMOTRIGINE 150 MG/1
TABLET ORAL
Qty: 60 TABLET | Refills: 1 | OUTPATIENT
Start: 2024-09-11

## 2024-09-16 RX ORDER — LAMOTRIGINE 150 MG/1
TABLET ORAL
Qty: 60 TABLET | Refills: 1 | Status: SHIPPED | OUTPATIENT
Start: 2024-09-16

## 2024-09-30 ENCOUNTER — OFFICE VISIT (OUTPATIENT)
Dept: PSYCHIATRY | Facility: CLINIC | Age: 24
End: 2024-09-30
Payer: COMMERCIAL

## 2024-09-30 DIAGNOSIS — F31.81 BIPOLAR 2 DISORDER: Primary | ICD-10-CM

## 2024-09-30 DIAGNOSIS — F41.1 GENERALIZED ANXIETY DISORDER WITH PANIC ATTACKS: ICD-10-CM

## 2024-09-30 DIAGNOSIS — F43.10 PTSD (POST-TRAUMATIC STRESS DISORDER): ICD-10-CM

## 2024-09-30 DIAGNOSIS — F41.0 GENERALIZED ANXIETY DISORDER WITH PANIC ATTACKS: ICD-10-CM

## 2024-09-30 PROCEDURE — 99499 UNLISTED E&M SERVICE: CPT | Mod: 95,,, | Performed by: PHYSICIAN ASSISTANT

## 2024-09-30 NOTE — PROGRESS NOTES
Outpatient Psychiatry Follow-Up Visit (HUI)    9/30/2024    Clinical Status of Patient:  Outpatient (Ambulatory)    The patient location is:  In her car outside her work in [place]  The chief complaint leading to consultation is: mood disorder    Visit type: audiovisual    Face to Face time with patient: 12  20 minutes of total time spent on the encounter, which includes face to face time and non-face to face time preparing to see the patient (eg, review of tests), Obtaining and/or reviewing separately obtained history, Documenting clinical information in the electronic or other health record, Independently interpreting results (not separately reported) and communicating results to the patient/family/caregiver, or Care coordination (not separately reported).     Each patient to whom he or she provides medical services by telemedicine is:  (1) informed of the relationship between the physician and patient and the respective role of any other health care provider with respect to management of the patient; and (2) notified that he or she may decline to receive medical services by telemedicine and may withdraw from such care at any time.        Chief Complaint:  Sunshine Lund is a 24 y.o. female who presents today for follow-up of mood disorder and anxiety.  Met with patient.      Interval History and Content of Current Session:  Interim Events/Subjective Report/Content of Current Session:   Patient returns for follow up of bipolar II and anxiety. She is currently taking amitriptyline 75 mg at bedtime, buspirone 20 mg PRN, lamotrigine 150 BID, and Vraylar 4.5 mg daily, increased last visit.  She states that her mood is now stable but she is feeling somewhat numb and has a bit of shakiness.  She is wondering if she can come off some or all of her meds.  States therapy is going very well.  Sleeping well.  Is unsure if the buspirone is helping much.      PSYCHIATRIC: Pertinant items are noted in the narrative.    Past  Medical, Family and Social History: The patient's past medical, family and social history have been reviewed and updated as appropriate within the electronic medical record - see encounter notes.    Compliance: yes    Side effects: see above    Risk Parameters:  Patient reports no suicidal ideation  Patient reports no homicidal ideation  Patient reports no self-injurious behavior  Patient reports no violent behavior    Exam (detailed: at least 9 elements; comprehensive: all 15 elements)   Constitutional  Vitals:  Most recent vital signs, dated less than 90 days prior to this appointment, were reviewed.   There were no vitals filed for this visit.     General:  unremarkable, age appropriate, casually dressed     Musculoskeletal  Muscle Strength/Tone:  not examined   Gait & Station:  not examined     Psychiatric  Speech:  no latency; no press   Mood & Affect:  steady  congruent and appropriate   Thought Process:  normal and logical   Associations:  intact   Thought Content:  normal, no suicidality, no homicidality, delusions, or paranoia   Insight:  intact   Judgement: behavior is adequate to circumstances   Orientation:  grossly intact   Memory: intact for content of interview   Language: grossly intact   Attention Span & Concentration:  able to focus   Fund of Knowledge:  intact and appropriate to age and level of education     Assessment and Diagnosis   Status/Progress: Based on the examination today, the patient's problem(s) is/are improved and well controlled.  New problems have not been presented today.   Diagnostic uncertainty and Lack of compliance are not complicating management of the primary condition.  There are no active rule-out diagnoses for this patient at this time.     General Impression: Bipolar II,AMANDA, and PTSD, now stable with increase in Vraylar but feeling numb and a little shaky.  Will try decreasing amitriptyline and/or buspirone.  Will hopefully be able to sleep OK without the amitriptyline  now that her mood is stabilized.  Discussed with pt that she is at a high risk for mood episode relapse if she stopps her mood stabilizers, but that after she is stable for several months we can talk about decreasing or stopping one or the other.      No diagnosis found.    1. Continue Vraylar 4.5 mg daily.  Risks/bebefits/side effects discussed.  2. Continue lamotrigine 150 mg BID, higher dose necessary due to interaction with OCP.  Risks/bebefits/side effects discussed.  Tolerating well so far, no rash.    3. Decrease amitriptyline by breaking 75 mg tablet in half- can discontinue if still sleeping OK.  4. Can try stopping buspirone.  5. Follow up 1 month    Intervention/Counseling/Treatment Plan   Medication Management: Continue current medications. The risks and benefits of medication were discussed with the patient.      Return to Clinic: 1 month    Visit today included increased complexity associated with the care of the episodic problem anxiety and depression addressed and managing the longitudinal care of the patient due to the serious and/or complex managed problem(s) bipolar II, AMANDA, PTSD.

## 2024-10-02 NOTE — PROGRESS NOTES
Pt logged on late then logged off, then provider logged on and waited 10 min, pt did not log back on.

## 2024-10-07 ENCOUNTER — OFFICE VISIT (OUTPATIENT)
Dept: PSYCHIATRY | Facility: CLINIC | Age: 24
End: 2024-10-07
Payer: COMMERCIAL

## 2024-10-07 DIAGNOSIS — F41.1 GENERALIZED ANXIETY DISORDER WITH PANIC ATTACKS: ICD-10-CM

## 2024-10-07 DIAGNOSIS — F43.10 PTSD (POST-TRAUMATIC STRESS DISORDER): ICD-10-CM

## 2024-10-07 DIAGNOSIS — F41.0 GENERALIZED ANXIETY DISORDER WITH PANIC ATTACKS: ICD-10-CM

## 2024-10-07 DIAGNOSIS — F31.81 BIPOLAR 2 DISORDER: Primary | ICD-10-CM

## 2024-10-07 PROCEDURE — 1159F MED LIST DOCD IN RCRD: CPT | Mod: CPTII,95,, | Performed by: PHYSICIAN ASSISTANT

## 2024-10-07 PROCEDURE — 1160F RVW MEDS BY RX/DR IN RCRD: CPT | Mod: CPTII,95,, | Performed by: PHYSICIAN ASSISTANT

## 2024-10-07 PROCEDURE — 3044F HG A1C LEVEL LT 7.0%: CPT | Mod: CPTII,95,, | Performed by: PHYSICIAN ASSISTANT

## 2024-10-07 PROCEDURE — 99214 OFFICE O/P EST MOD 30 MIN: CPT | Mod: 95,,, | Performed by: PHYSICIAN ASSISTANT

## 2024-10-07 PROCEDURE — G2211 COMPLEX E/M VISIT ADD ON: HCPCS | Mod: 95,,, | Performed by: PHYSICIAN ASSISTANT

## 2024-10-07 NOTE — PROGRESS NOTES
Outpatient Psychiatry Follow-Up Visit (HUI)    10/7/2024    Clinical Status of Patient:  Outpatient (Ambulatory)    The patient location is:  In her car outside her work in [place]  The chief complaint leading to consultation is: mood disorder    Visit type: audiovisual    Face to Face time with patient: 12  20 minutes of total time spent on the encounter, which includes face to face time and non-face to face time preparing to see the patient (eg, review of tests), Obtaining and/or reviewing separately obtained history, Documenting clinical information in the electronic or other health record, Independently interpreting results (not separately reported) and communicating results to the patient/family/caregiver, or Care coordination (not separately reported).     Each patient to whom he or she provides medical services by telemedicine is:  (1) informed of the relationship between the physician and patient and the respective role of any other health care provider with respect to management of the patient; and (2) notified that he or she may decline to receive medical services by telemedicine and may withdraw from such care at any time.        Chief Complaint:  Sunshine Lund is a 24 y.o. female who presents today for follow-up of mood disorder and anxiety.  Met with patient.      Interval History and Content of Current Session:  Interim Events/Subjective Report/Content of Current Session:   Patient returns for follow up of bipolar II and anxiety. She is currently taking amitriptyline 37.5 mg at bedtime, buspirone 20 mg PRN, lamotrigine 150 BID, and Vraylar 4.5 mg daily.  Last visit we decreased amitriptyline as pt was sleeping much better after increasing Vraylar.  She states that she is still doing well, sleep did not worsen with the lower dose of amitriptyline.  She does say she has a fine tremor of her hands since increasing Vraylar.  Is going to move out of her parents' house soon in to an apartment with her  boyfriend, which she feels will be a very good thing.  Has decreased her frequency of therapy to every 6 weeks.  She feels her life is a lot less stressful lately.    PSYCHIATRIC: Pertinant items are noted in the narrative.    Past Medical, Family and Social History: The patient's past medical, family and social history have been reviewed and updated as appropriate within the electronic medical record - see encounter notes.    Compliance: yes    Side effects: see above    Risk Parameters:  Patient reports no suicidal ideation  Patient reports no homicidal ideation  Patient reports no self-injurious behavior  Patient reports no violent behavior    Exam (detailed: at least 9 elements; comprehensive: all 15 elements)   Constitutional  Vitals:  Most recent vital signs, dated less than 90 days prior to this appointment, were reviewed.   There were no vitals filed for this visit.     General:  unremarkable, age appropriate, casually dressed     Musculoskeletal  Muscle Strength/Tone:  tremor noted fine tremor of hands   Gait & Station:  not examined     Psychiatric  Speech:  no latency; no press   Mood & Affect:  steady  congruent and appropriate   Thought Process:  normal and logical   Associations:  intact   Thought Content:  normal, no suicidality, no homicidality, delusions, or paranoia   Insight:  intact   Judgement: behavior is adequate to circumstances   Orientation:  grossly intact   Memory: intact for content of interview   Language: grossly intact   Attention Span & Concentration:  able to focus   Fund of Knowledge:  intact and appropriate to age and level of education     Assessment and Diagnosis   Status/Progress: Based on the examination today, the patient's problem(s) is/are improved and well controlled.  New problems have not been presented today.   Diagnostic uncertainty and Lack of compliance are not complicating management of the primary condition.  There are no active rule-out diagnoses for this patient  at this time.     General Impression: Bipolar II,AMANDA, and PTSD, now stable with increase in Vraylar, feeling less numb and still sleeping well with decrease in amitriptyline.  Will try coming off of amitriptyline all together.  If she continues to do well, we may decrease Vraylar back to 3 mg as her life is calmer now.      ICD-10-CM ICD-9-CM   1. Bipolar 2 disorder  F31.81 296.89   2. Generalized anxiety disorder with panic attacks  F41.1 300.02    F41.0 300.01   3. PTSD (post-traumatic stress disorder)  F43.10 309.81       1. Continue Vraylar 4.5 mg daily.  Risks/bebefits/side effects discussed.  2. Continue lamotrigine 150 mg BID, higher dose necessary due to interaction with OCP.  Risks/bebefits/side effects discussed.  Tolerating well so far, no rash.    3. Stop amitriptyline  4. Continue buspirone 15 mg BID.  5. Follow up 1 month    Intervention/Counseling/Treatment Plan   Medication Management: Continue current medications. The risks and benefits of medication were discussed with the patient.      Return to Clinic: 1 month    Visit today included increased complexity associated with the care of the episodic problem anxiety and depression addressed and managing the longitudinal care of the patient due to the serious and/or complex managed problem(s) bipolar II, AMANDA, PTSD.

## 2024-11-07 ENCOUNTER — OFFICE VISIT (OUTPATIENT)
Dept: PSYCHIATRY | Facility: CLINIC | Age: 24
End: 2024-11-07
Payer: COMMERCIAL

## 2024-11-07 ENCOUNTER — PATIENT MESSAGE (OUTPATIENT)
Dept: PSYCHIATRY | Facility: HOSPITAL | Age: 24
End: 2024-11-07
Payer: COMMERCIAL

## 2024-11-07 DIAGNOSIS — F41.0 GENERALIZED ANXIETY DISORDER WITH PANIC ATTACKS: ICD-10-CM

## 2024-11-07 DIAGNOSIS — F43.10 PTSD (POST-TRAUMATIC STRESS DISORDER): ICD-10-CM

## 2024-11-07 DIAGNOSIS — F41.1 GENERALIZED ANXIETY DISORDER WITH PANIC ATTACKS: ICD-10-CM

## 2024-11-07 DIAGNOSIS — F31.81 BIPOLAR 2 DISORDER: Primary | ICD-10-CM

## 2024-11-07 NOTE — PROGRESS NOTES
Outpatient Psychiatry Follow-Up Visit (HUI)    11/7/2024    Clinical Status of Patient:  Outpatient (Ambulatory)    The patient location is:  In her car outside her work in [place]  The chief complaint leading to consultation is: mood disorder    Visit type: audiovisual    Face to Face time with patient: 12  20 minutes of total time spent on the encounter, which includes face to face time and non-face to face time preparing to see the patient (eg, review of tests), Obtaining and/or reviewing separately obtained history, Documenting clinical information in the electronic or other health record, Independently interpreting results (not separately reported) and communicating results to the patient/family/caregiver, or Care coordination (not separately reported).     Each patient to whom he or she provides medical services by telemedicine is:  (1) informed of the relationship between the physician and patient and the respective role of any other health care provider with respect to management of the patient; and (2) notified that he or she may decline to receive medical services by telemedicine and may withdraw from such care at any time.        Chief Complaint:  Sunshine Lund is a 24 y.o. female who presents today for follow-up of mood disorder and anxiety.  Met with patient.      Interval History and Content of Current Session:  Interim Events/Subjective Report/Content of Current Session:   Patient returns for follow up of bipolar II and anxiety. She is currently taking buspirone 20 mg PRN, lamotrigine 150 BID, and Vraylar 4.5 mg daily.  Last visit we stopped amitriptyline- pt states she is waking up one time per night but is able to go back to sleep.  Feels nauseated in the mornings, which started after stopping amitriptyline, but hasn't improved in the several weeks since her last dose.  Feels like she is doing OK, not depressed or hypomanic despite having a rough weekend moving in to her new  apartment.    PSYCHIATRIC: Pertinant items are noted in the narrative.    Past Medical, Family and Social History: The patient's past medical, family and social history have been reviewed and updated as appropriate within the electronic medical record - see encounter notes.    Compliance: yes    Side effects: see above    Risk Parameters:  Patient reports no suicidal ideation  Patient reports no homicidal ideation  Patient reports no self-injurious behavior  Patient reports no violent behavior    Exam (detailed: at least 9 elements; comprehensive: all 15 elements)   Constitutional  Vitals:  Most recent vital signs, dated less than 90 days prior to this appointment, were reviewed.   There were no vitals filed for this visit.     General:  unremarkable, age appropriate, casually dressed     Musculoskeletal  Muscle Strength/Tone:  tremor noted fine tremor of hands   Gait & Station:  not examined     Psychiatric  Speech:  no latency; no press   Mood & Affect:  steady  congruent and appropriate   Thought Process:  normal and logical   Associations:  intact   Thought Content:  normal, no suicidality, no homicidality, delusions, or paranoia   Insight:  intact   Judgement: behavior is adequate to circumstances   Orientation:  grossly intact   Memory: intact for content of interview   Language: grossly intact   Attention Span & Concentration:  able to focus   Fund of Knowledge:  intact and appropriate to age and level of education     Assessment and Diagnosis   Status/Progress: Based on the examination today, the patient's problem(s) is/are adequately but not ideally controlled.  New problems have not been presented today.   Diagnostic uncertainty and Lack of compliance are not complicating management of the primary condition.  There are no active rule-out diagnoses for this patient at this time.     General Impression: Bipolar II,AMANDA, and PTSD, now stable with Vraylar, lamotrigine, and buspirone.      ICD-10-CM ICD-9-CM    1. Bipolar 2 disorder  F31.81 296.89   2. Generalized anxiety disorder with panic attacks  F41.1 300.02    F41.0 300.01   3. PTSD (post-traumatic stress disorder)  F43.10 309.81       1. Continue Vraylar 4.5 mg daily.  Risks/bebefits/side effects discussed.  2. Continue lamotrigine 150 mg BID, higher dose necessary due to interaction with OCP.  Risks/bebefits/side effects discussed.  Tolerating well so far, no rash.    4. Continue buspirone 15 mg BID.  5. Follow up 2 months- may try decreasing Vraylar at that point.    Intervention/Counseling/Treatment Plan   Medication Management: Continue current medications. The risks and benefits of medication were discussed with the patient.      Return to Clinic: 2 months    Visit today included increased complexity associated with the care of the episodic problem anxiety and depression addressed and managing the longitudinal care of the patient due to the serious and/or complex managed problem(s) bipolar II, AMANDA, PTSD.

## 2024-11-20 ENCOUNTER — TELEPHONE (OUTPATIENT)
Dept: INTERNAL MEDICINE | Facility: CLINIC | Age: 24
End: 2024-11-20

## 2024-11-20 ENCOUNTER — OFFICE VISIT (OUTPATIENT)
Dept: INTERNAL MEDICINE | Facility: CLINIC | Age: 24
End: 2024-11-20
Payer: COMMERCIAL

## 2024-11-20 ENCOUNTER — LAB VISIT (OUTPATIENT)
Dept: LAB | Facility: HOSPITAL | Age: 24
End: 2024-11-20
Payer: COMMERCIAL

## 2024-11-20 VITALS
SYSTOLIC BLOOD PRESSURE: 118 MMHG | BODY MASS INDEX: 20.14 KG/M2 | OXYGEN SATURATION: 98 % | WEIGHT: 136 LBS | HEART RATE: 89 BPM | HEIGHT: 69 IN | DIASTOLIC BLOOD PRESSURE: 76 MMHG

## 2024-11-20 DIAGNOSIS — R11.0 NAUSEA: Primary | ICD-10-CM

## 2024-11-20 DIAGNOSIS — R11.0 NAUSEA: ICD-10-CM

## 2024-11-20 DIAGNOSIS — F31.81 BIPOLAR 2 DISORDER: ICD-10-CM

## 2024-11-20 LAB
ALBUMIN SERPL BCP-MCNC: 4.5 G/DL (ref 3.5–5.2)
ALP SERPL-CCNC: 58 U/L (ref 40–150)
ALT SERPL W/O P-5'-P-CCNC: 6 U/L (ref 10–44)
ANION GAP SERPL CALC-SCNC: 8 MMOL/L (ref 8–16)
AST SERPL-CCNC: 11 U/L (ref 10–40)
B-HCG UR QL: NEGATIVE
BASOPHILS # BLD AUTO: 0.02 K/UL (ref 0–0.2)
BASOPHILS NFR BLD: 0.4 % (ref 0–1.9)
BILIRUB SERPL-MCNC: 0.4 MG/DL (ref 0.1–1)
BUN SERPL-MCNC: 13 MG/DL (ref 6–20)
CALCIUM SERPL-MCNC: 9.5 MG/DL (ref 8.7–10.5)
CHLORIDE SERPL-SCNC: 108 MMOL/L (ref 95–110)
CO2 SERPL-SCNC: 24 MMOL/L (ref 23–29)
CREAT SERPL-MCNC: 1 MG/DL (ref 0.5–1.4)
CTP QC/QA: YES
DIFFERENTIAL METHOD BLD: NORMAL
EOSINOPHIL # BLD AUTO: 0.1 K/UL (ref 0–0.5)
EOSINOPHIL NFR BLD: 1 % (ref 0–8)
ERYTHROCYTE [DISTWIDTH] IN BLOOD BY AUTOMATED COUNT: 13.9 % (ref 11.5–14.5)
EST. GFR  (NO RACE VARIABLE): >60 ML/MIN/1.73 M^2
GLUCOSE SERPL-MCNC: 90 MG/DL (ref 70–110)
HCT VFR BLD AUTO: 38.2 % (ref 37–48.5)
HGB BLD-MCNC: 12.4 G/DL (ref 12–16)
IMM GRANULOCYTES # BLD AUTO: 0.01 K/UL (ref 0–0.04)
IMM GRANULOCYTES NFR BLD AUTO: 0.2 % (ref 0–0.5)
LIPASE SERPL-CCNC: 14 U/L (ref 4–60)
LYMPHOCYTES # BLD AUTO: 1 K/UL (ref 1–4.8)
LYMPHOCYTES NFR BLD: 19.2 % (ref 18–48)
MCH RBC QN AUTO: 29.9 PG (ref 27–31)
MCHC RBC AUTO-ENTMCNC: 32.5 G/DL (ref 32–36)
MCV RBC AUTO: 92 FL (ref 82–98)
MONOCYTES # BLD AUTO: 0.3 K/UL (ref 0.3–1)
MONOCYTES NFR BLD: 6.7 % (ref 4–15)
NEUTROPHILS # BLD AUTO: 3.6 K/UL (ref 1.8–7.7)
NEUTROPHILS NFR BLD: 72.5 % (ref 38–73)
NRBC BLD-RTO: 0 /100 WBC
PLATELET # BLD AUTO: 208 K/UL (ref 150–450)
PMV BLD AUTO: 11.7 FL (ref 9.2–12.9)
POTASSIUM SERPL-SCNC: 4.2 MMOL/L (ref 3.5–5.1)
PROT SERPL-MCNC: 7.5 G/DL (ref 6–8.4)
RBC # BLD AUTO: 4.15 M/UL (ref 4–5.4)
SODIUM SERPL-SCNC: 140 MMOL/L (ref 136–145)
TSH SERPL DL<=0.005 MIU/L-ACNC: 0.5 UIU/ML (ref 0.4–4)
WBC # BLD AUTO: 4.95 K/UL (ref 3.9–12.7)

## 2024-11-20 PROCEDURE — 99214 OFFICE O/P EST MOD 30 MIN: CPT | Mod: S$GLB,,, | Performed by: PHYSICIAN ASSISTANT

## 2024-11-20 PROCEDURE — 3078F DIAST BP <80 MM HG: CPT | Mod: CPTII,S$GLB,, | Performed by: PHYSICIAN ASSISTANT

## 2024-11-20 PROCEDURE — 3074F SYST BP LT 130 MM HG: CPT | Mod: CPTII,S$GLB,, | Performed by: PHYSICIAN ASSISTANT

## 2024-11-20 PROCEDURE — 85025 COMPLETE CBC W/AUTO DIFF WBC: CPT | Performed by: PHYSICIAN ASSISTANT

## 2024-11-20 PROCEDURE — 81025 URINE PREGNANCY TEST: CPT | Mod: S$GLB,,, | Performed by: PHYSICIAN ASSISTANT

## 2024-11-20 PROCEDURE — 3008F BODY MASS INDEX DOCD: CPT | Mod: CPTII,S$GLB,, | Performed by: PHYSICIAN ASSISTANT

## 2024-11-20 PROCEDURE — 84443 ASSAY THYROID STIM HORMONE: CPT | Performed by: PHYSICIAN ASSISTANT

## 2024-11-20 PROCEDURE — 99999 PR PBB SHADOW E&M-EST. PATIENT-LVL IV: CPT | Mod: PBBFAC,,, | Performed by: PHYSICIAN ASSISTANT

## 2024-11-20 PROCEDURE — 80053 COMPREHEN METABOLIC PANEL: CPT | Performed by: PHYSICIAN ASSISTANT

## 2024-11-20 PROCEDURE — 1159F MED LIST DOCD IN RCRD: CPT | Mod: CPTII,S$GLB,, | Performed by: PHYSICIAN ASSISTANT

## 2024-11-20 PROCEDURE — 1160F RVW MEDS BY RX/DR IN RCRD: CPT | Mod: CPTII,S$GLB,, | Performed by: PHYSICIAN ASSISTANT

## 2024-11-20 PROCEDURE — 36415 COLL VENOUS BLD VENIPUNCTURE: CPT | Performed by: PHYSICIAN ASSISTANT

## 2024-11-20 PROCEDURE — 3044F HG A1C LEVEL LT 7.0%: CPT | Mod: CPTII,S$GLB,, | Performed by: PHYSICIAN ASSISTANT

## 2024-11-20 PROCEDURE — 83690 ASSAY OF LIPASE: CPT | Performed by: PHYSICIAN ASSISTANT

## 2024-11-20 RX ORDER — LAMOTRIGINE 150 MG/1
TABLET ORAL
Qty: 60 TABLET | Refills: 1 | Status: SHIPPED | OUTPATIENT
Start: 2024-11-20

## 2024-11-20 RX ORDER — ONDANSETRON 4 MG/1
4 TABLET, ORALLY DISINTEGRATING ORAL EVERY 8 HOURS PRN
Qty: 20 TABLET | Refills: 0 | Status: SHIPPED | OUTPATIENT
Start: 2024-11-20

## 2024-11-20 NOTE — PROGRESS NOTES
"Subjective     Patient ID: Sunshine Lund is a 24 y.o. female.    Chief Complaint: Morning Sickness (Nausea every morning for about 4 weeks )    HPI    Established pt of Brown Valdes MD (new to me)    Here with concerns of persistent nausea since weaning amitriptyline, weaned from 75mg over a month, last dose was 1 mo ago. Every am severe nausea with vomiting(stomach acid), sometimes better on the weekends. No abdominal pain, diarrhea, constipation or fevers. Has lost weight since last visit in Sept. No change in diet. LMP 2 weeks ago.     Other meds: Vraylar and Lamotrigine  Vraylar was increased prior to weaning amitriptyline.       Past Medical History:   Diagnosis Date    Bipolar disorder, unspecified      Social History     Tobacco Use    Smoking status: Every Day     Types: Vaping w/o nicotine, Vaping with nicotine    Smokeless tobacco: Never    Tobacco comments:     And weed   Substance Use Topics    Alcohol use: Not Currently    Drug use: Yes     Types: Marijuana     Comment: daily     Review of patient's allergies indicates:  No Known Allergies      Review of Systems   Constitutional:  Negative for chills, fever and unexpected weight change.   Respiratory:  Negative for cough and shortness of breath.    Cardiovascular:  Negative for chest pain and leg swelling.   Gastrointestinal:  Positive for nausea. Negative for abdominal pain and vomiting.   Integumentary:  Negative for rash.   Neurological:  Negative for weakness and headaches.          Objective  /76 (BP Location: Left arm, Patient Position: Sitting)   Pulse 89   Ht 5' 9" (1.753 m)   Wt 61.7 kg (136 lb 0.4 oz)   SpO2 98%   BMI 20.09 kg/m²       Physical Exam  Vitals reviewed.   Constitutional:       General: She is not in acute distress.     Appearance: She is well-developed. She is not ill-appearing.   HENT:      Head: Normocephalic and atraumatic.   Cardiovascular:      Rate and Rhythm: Normal rate and regular rhythm.      Heart sounds: " No murmur heard.  Pulmonary:      Effort: Pulmonary effort is normal.      Breath sounds: Normal breath sounds. No wheezing or rales.   Abdominal:      General: Bowel sounds are normal.      Palpations: Abdomen is soft.      Tenderness: There is no abdominal tenderness.   Musculoskeletal:      Right lower leg: No edema.      Left lower leg: No edema.   Skin:     General: Skin is warm and dry.      Findings: No rash.   Neurological:      Mental Status: She is alert.   Psychiatric:         Mood and Affect: Mood normal.            Assessment and Plan     1. Nausea  -     CBC Auto Differential; Future; Expected date: 11/20/2024  -     Comprehensive Metabolic Panel; Future; Expected date: 11/20/2024  -     TSH; Future; Expected date: 11/20/2024  -     ondansetron (ZOFRAN-ODT) 4 MG TbDL; Take 1 tablet (4 mg total) by mouth every 8 (eight) hours as needed (nausea/vomiting).  Dispense: 20 tablet; Refill: 0  -     POCT Urine Pregnancy (negative)  -     Lipase; Future; Expected date: 11/20/2024    2. Bipolar 2 disorder  Stable  Followed by Psychiatry         Lab as above  We discussed trial of zofran prn  Pt maybe experiencing nausea from her SSRIs that amitriptyline may have helped suppress  We also discussed chronic marijuana use as a possible cause  F/u in 2 weeks    Sadaf Bueno PA-C

## 2024-12-16 NOTE — PROGRESS NOTES
History and physical    Patient Care Team:  Anupam Thorne MD as PCP - General (Family Medicine)  Melinda Burns MD as Consulting Physician (Gastroenterology)  Livan, Mariano ISLAS MD as Consulting Physician (Cardiothoracic Surgery)    Chief complaint shortness of breath    Subjective .     History of present illness: Mr. Back is a 65-year-old male who underwent a right 10th rib excision with diaphragm reconstruction and complex chest wall reconstruction by Dr. Licona on 9/30/2024.  He has had some dehiscence of the musculature of the chest wall reconstruction in addition to a right pleural effusion.  He underwent thoracentesis and felt much better.  He had follow-up with Dr. Licona in the office on 12/5/2024 with repeat CT scan of the chest which showed some reaccumulation of pleural effusion albeit much smaller than before but was still with moderate size pleural effusion.  The extrathoracic portion was much smaller.  At that time he had been wearing a binder for compression.  He discussed with him options for treatment to include a short course of Lasix, repeat thoracentesis, and possible admission with pigtail catheter placement for more definitive drainage and possible talc pleurodesis.  Ultimately he elected to proceed with a trial of Lasix.  He called the office today to report progressively worsening shortness of breath and he was advised ER evaluation.  Ultrasound was performed at bedside by Dr. Licona revealing moderate right-sided pleural effusion.    History  Code Status and Medical Interventions: CPR (Attempt to Resuscitate); Full Support   Ordered at: 12/16/24 1148     Code Status (Patient has no pulse and is not breathing):    CPR (Attempt to Resuscitate)     Medical Interventions (Patient has pulse or is breathing):    Full Support         Past Medical History:   Diagnosis Date    Bone cyst     RIGHT RIB    History of colon polyps     Hypertension     Kidney stone    ,   Past Surgical History:   Procedure  Outpatient Psychiatry Follow-Up Visit ([facility])    3/15/2023    Clinical Status of Patient:  Outpatient (Ambulatory)    The patient location is: Home in [place]  The chief complaint leading to consultation is: mood disorder    Visit type: audiovisual    Face to Face time with patient: 16  30 minutes of total time spent on the encounter, which includes face to face time and non-face to face time preparing to see the patient (eg, review of tests), Obtaining and/or reviewing separately obtained history, Documenting clinical information in the electronic or other health record, Independently interpreting results (not separately reported) and communicating results to the patient/family/caregiver, or Care coordination (not separately reported).     Each patient to whom he or she provides medical services by telemedicine is:  (1) informed of the relationship between the physician and patient and the respective role of any other health care provider with respect to management of the patient; and (2) notified that he or she may decline to receive medical services by telemedicine and may withdraw from such care at any time.        Chief Complaint:  Sunshine Lund is a 22 y.o. female who presents today for follow-up of mood disorder and anxiety.  Met with patient.      Interval History and Content of Current Session:  Interim Events/Subjective Report/Content of Current Session:   Patient returns for follow up of bipolar II and anxiety. She is currently taking Buspirone 20 mg TID, amitriptyline 37.5 mg qHS, and ziprasidone 40 mg AM and 20 mg PM.  Pt states that she had previously been taking 20 mg of ziprasidone BID, but increased to 40 mg AM and 20 mg PM due to new directions on the bottle.  States that since the increase she has felt very jittery, with palpitations, and internal sense of restlessness.  Has been sleeping the same amount, and only behavioral change is that she spent $150 on clothes at the mall, which is more  Laterality Date    CHOLECYSTECTOMY  10/02/2022    COLONOSCOPY  10/12/2015    One 6mm hyperplastic polyp in the rectum; Repeat 5 years    COLONOSCOPY N/A 2021    Procedure: COLONOSCOPY WITH ANESTHESIA;  Surgeon: Melinda Burns MD;  Location: [facility] ENDOSCOPY;  Service: Gastroenterology;  Laterality: N/A;  pre op: hx polyps  post op: int hemmhroids   PCP: Anupam Quintana MD    THORACOTOMY Right 2024    Procedure: RIGHT 10th RIB EXCISION;  Surgeon: Mariano Licona MD;  Location: [facility] OR;  Service: Cardiothoracic;  Laterality: Right;    VASECTOMY     ,   Family History   Problem Relation Age of Onset    Brain cancer Father     No Known Problems Mother     Colon cancer Neg Hx     Colon polyps Neg Hx     Esophageal cancer Neg Hx     Liver cancer Neg Hx     Liver disease Neg Hx     Rectal cancer Neg Hx     Stomach cancer Neg Hx    ,   Social History     Tobacco Use    Smoking status: Former     Current packs/day: 0.00     Average packs/day: 1 pack/day for 13.0 years (13.0 ttl pk-yrs)     Types: Cigarettes     Start date:      Quit date:      Years since quittin.9     Passive exposure: Past    Smokeless tobacco: Never   Vaping Use    Vaping status: Never Used   Substance Use Topics    Alcohol use: Not Currently    Drug use: Never   , (Not in a hospital admission)  , Allergies: Pedi-pre tape spray [wound dressing adhesive]    Review of Systems  Review of Systems   Constitutional:  Negative for chills, diaphoresis, fatigue and fever.   HENT:  Negative for trouble swallowing and voice change.    Eyes:  Negative for visual disturbance.   Respiratory:  Positive for shortness of breath. Negative for chest tightness.    Cardiovascular:  Negative for chest pain, palpitations and leg swelling.   Gastrointestinal:  Negative for abdominal pain, diarrhea, nausea and vomiting.   Genitourinary:  Negative for difficulty urinating, dysuria and hematuria.   Musculoskeletal:  Negative for arthralgias and  than she thinks she should have.        Review of Systems   PSYCHIATRIC: Pertinant items are noted in the narrative.  CONSTITUTIONAL: No weight gain or loss.   MUSCULOSKELETAL: No pain or stiffness of the joints.  NEUROLOGIC: No weakness, sensory changes, seizures, confusion, memory loss, tremor or other abnormal movements.    Past Medical, Family and Social History: The patient's past medical, family and social history have been reviewed and updated as appropriate within the electronic medical record - see encounter notes.    Compliance: yes    Side effects: None    Risk Parameters:  Patient reports no suicidal ideation  Patient reports no homicidal ideation  Patient reports no self-injurious behavior  Patient reports no violent behavior    Exam (detailed: at least 9 elements; comprehensive: all 15 elements)   Constitutional  Vitals:  Most recent vital signs, dated less than 90 days prior to this appointment, were reviewed.   There were no vitals filed for this visit.     General:  unremarkable, age appropriate, casually dressed     Musculoskeletal  Muscle Strength/Tone:  not examined   Gait & Station:  not examined     Psychiatric  Speech:  no latency; no press   Mood & Affect:  steady  congruent and appropriate   Thought Process:  normal and logical   Associations:  intact   Thought Content:  normal, no suicidality, no homicidality, delusions, or paranoia   Insight:  intact   Judgement: behavior is adequate to circumstances   Orientation:  grossly intact   Memory: intact for content of interview   Language: grossly intact   Attention Span & Concentration:  able to focus   Fund of Knowledge:  intact and appropriate to age and level of education     Assessment and Diagnosis   Status/Progress: Based on the examination today, the patient's problem(s) is/are adequately but not ideally controlled.  New problems have been presented today.   Diagnostic uncertainty and Lack of compliance are not complicating management of  "myalgias.   Skin:  Negative for color change, pallor, rash and wound.   Neurological:  Negative for dizziness, syncope and light-headedness.   Hematological:  Does not bruise/bleed easily.   Psychiatric/Behavioral:  Negative for agitation, confusion and sleep disturbance.         Objective     Vital Signs   Visit Vitals  /56 (BP Location: Right arm, Patient Position: Sitting)   Pulse 101   Temp 98.4 °F (36.9 °C) (Oral)   Resp 14   Ht 180.3 cm (71\")   Wt 117 kg (259 lb)   SpO2 95%   BMI 36.12 kg/m²       Physical Exam  Vitals reviewed.   Constitutional:       General: He is not in acute distress.     Appearance: He is obese.   HENT:      Head: Normocephalic.   Eyes:      Pupils: Pupils are equal, round, and reactive to light.   Cardiovascular:      Rate and Rhythm: Normal rate and regular rhythm.      Heart sounds: Normal heart sounds. No murmur heard.  Pulmonary:      Effort: Pulmonary effort is normal.      Comments: Diminished breath sounds on the right  Abdominal:      General: There is no distension.      Palpations: Abdomen is soft.      Tenderness: There is no abdominal tenderness.   Musculoskeletal:         General: No swelling or tenderness.   Skin:     General: Skin is warm and dry.   Neurological:      General: No focal deficit present.      Mental Status: He is alert and oriented to person, place, and time.   Psychiatric:         Mood and Affect: Mood normal.         Behavior: Behavior normal.         Thought Content: Thought content normal.         Judgment: Judgment normal.           LAB:   CBC:  Results from last 7 days   Lab Units 12/16/24  1032   WBC 10*3/mm3 8.90   HEMATOCRIT % 42.8   PLATELETS 10*3/mm3 219          BMP:)  Results from last 7 days   Lab Units 12/16/24  1032   SODIUM mmol/L 140   POTASSIUM mmol/L 3.3*   CHLORIDE mmol/L 102   CO2 mmol/L 28.0   GLUCOSE mg/dL 155*   BUN mg/dL 18   CREATININE mg/dL 0.86           COAG:      Invalid input(s): \"PT\"        IMAGES:       Imaging " the primary condition.  There are no active rule-out diagnoses for this patient at this time.     General Impression: Bipolar II and AMANDA now well controlled on below medication regimen, after some confusion about dosing of ziprasidone.  Pt likely having some akathisia.  Will have her return to her lower dose of 20 mg BID.  We will watch for changes in mood.      ICD-10-CM ICD-9-CM   1. Bipolar 2 disorder  F31.81 296.89   2. Generalized anxiety disorder with panic attacks  F41.1 300.02    F41.0 300.01       1. Continue amitriptyline 37.5 mg nightly.  2. Continue buspirone 20 mg TID targeting anxiety.  3. Continue ziprasidone at lower dose of 20 mg BID, as pt was previously taking it.  4. Follow up in one month    Intervention/Counseling/Treatment Plan   Medication Management: Continue current medications. The risks and benefits of medication were discussed with the patient.      Return to Clinic: 1 month    MEDICAL DECISION MAKING ELEMENTS:    NUMBER AND COMPLEXITY OF PROBLEMS ADDRESSED AT THE ENCOUNTER:   [] N/A    [] One self-limited or minor problem    [] Two or more self-limited or minor problems    [] One stable, chronic illness    [] One acute, uncomplicated illness or injury    [] One stable, acute illness    [] One acute, uncomplicated illness or injury requiring hospital inpatient or observational level of care   [x] One or more chronic illnesses with exacerbation, progression, or side effects of treatment    [] Two or more stable, chronic illnesses    [] One undiagnosed new problem with uncertain prognosis    [] One acute illness with system symptoms    [] One acute, complicated injury    [] One or more chronic illnesses with severe exacerbation, progression, or side effects of treatment    [] One acute or chronic illness or injury that poses a threat to life or bodily function    AMOUNT AND/OR COMPLEXITY OF DATA TO BE REVIEWED AND ANALYZED:   [] N/A    [x] Review of a prior external note(s)    [] Review of  Results (Last 24 Hours)       Procedure Component Value Units Date/Time    XR Chest 1 View [[id number]] Resulted: 12/16/24 1134     Updated: 12/16/24 1134    XR Chest 1 View [[id number]] Collected: 12/16/24 1128     Updated: 12/16/24 1133    Narrative:      XR CHEST 1 VW- 12/16/2024 10:19 AM     HISTORY: Shortness of air       COMPARISON: 11/15/2024     FINDINGS:  Upright frontal radiograph of the chest was obtained     There is some patchy airspace disease in the right lung base with what  appears to be a trace pleural effusion. This is less prominent when  compared to the recent comparison chest x-rays from November. The  cardiomediastinal silhouette and pulmonary vascularity are within normal  limits. The osseous structures and surrounding soft tissues demonstrate  no acute abnormality.       Impression:      1.  Right lower lobe airspace disease and trace right-sided pleural  effusion which appear to be decreasing when compared to the recent  comparison chest x-ray from November.     This report was signed and finalized on 12/16/2024 11:30 AM by Dr Eber Ascencio.                            Assessment & Plan    Recurrent right pleural effusion  Shortness of breath    Right pigtail catheter placed at bedside with ultrasound guidance by Dr. Licona.  Keep right pigtail catheter to 20 cm suction  Flush right pigtail catheter with 10 mL normal saline every 8 hours to maintain tube patency  Daily chest x-ray  Plan on CT chest in the near future after effusion has drained  Up out of bed walking  Tentative plan for talc pleurodesis in the near future.  Discussed with patient, his wife, nursing, and Dr. Lauryn Brown, ROLAND  12/16/24  11:49 CST   a test result(s)    [] Ordering of a test(s)    [] Assessment requiring an independent historian(s)    [] Independent interpretation of a test(s)    [] Discussion of management or test interpretation with external professional source    RISK OF COMPLICATIONS AND/OR MORBIDITY OR MORALITY OF PATIENT MANAGEMENT:   [] N/A    [] Minimal    [] Low    [] Moderate    [x] High  [] Prescription drug management    [] Diagnosis or treatment significantly limited by social determinants of health    [] Drug therapy requiring intensive monitoring for toxicity    [] Decision regarding hospitalization or escalation of hospital-level care    [] Decision to de-escalate care because of poor prognosis    [] Parenteral controlled substances    LEVEL OF MEDICAL DECISION MAKING:   [] N/A    [] Straightforward    [] Low    [x] Moderate    [] High    PATIENT'S ABILITY AND CAPACITY TO RESPOND TO TREATMENT  [] N/A    [] Absent    [] Minimal    [x] Adequate    [] Robust    [] Unable to be determined    TOTAL TIME FOR SERVICES PERFORMED ON THE DATE OF THE ENCOUNTER:   [x] N/A    []  minutes    CPT CODE JUSTIFICATION:   [] N/A    [x] Level of MDM    [] Time    [] Meets criteria for both Level of MDM and Time

## 2025-01-07 ENCOUNTER — OFFICE VISIT (OUTPATIENT)
Dept: PSYCHIATRY | Facility: CLINIC | Age: 25
End: 2025-01-07
Payer: COMMERCIAL

## 2025-01-07 DIAGNOSIS — F31.81 BIPOLAR 2 DISORDER: Primary | ICD-10-CM

## 2025-01-07 DIAGNOSIS — F43.10 PTSD (POST-TRAUMATIC STRESS DISORDER): ICD-10-CM

## 2025-01-07 DIAGNOSIS — F41.1 GENERALIZED ANXIETY DISORDER WITH PANIC ATTACKS: ICD-10-CM

## 2025-01-07 DIAGNOSIS — F41.0 GENERALIZED ANXIETY DISORDER WITH PANIC ATTACKS: ICD-10-CM

## 2025-01-07 RX ORDER — AMITRIPTYLINE HYDROCHLORIDE 10 MG/1
10 TABLET, FILM COATED ORAL NIGHTLY
Qty: 90 TABLET | Refills: 0 | Status: SHIPPED | OUTPATIENT
Start: 2025-01-07 | End: 2025-04-07

## 2025-01-07 NOTE — PROGRESS NOTES
Outpatient Psychiatry Follow-Up Visit ([facility])    1/7/2025    Clinical Status of Patient:  Outpatient (Ambulatory)    The patient location is:  In her car outside her work in [place]  The chief complaint leading to consultation is: mood disorder    Visit type: audiovisual    Face to Face time with patient: 6 min  15 minutes of total time spent on the encounter, which includes face to face time and non-face to face time preparing to see the patient (eg, review of tests), Obtaining and/or reviewing separately obtained history, Documenting clinical information in the electronic or other health record, Independently interpreting results (not separately reported) and communicating results to the patient/family/caregiver, or Care coordination (not separately reported).     Each patient to whom he or she provides medical services by telemedicine is:  (1) informed of the relationship between the physician and patient and the respective role of any other health care provider with respect to management of the patient; and (2) notified that he or she may decline to receive medical services by telemedicine and may withdraw from such care at any time.        Chief Complaint:  Sunshine Lund is a 24 y.o. female who presents today for follow-up of mood disorder and anxiety.  Met with patient.      Interval History and Content of Current Session:  Interim Events/Subjective Report/Content of Current Session:   Patient returns for follow up of bipolar II and anxiety. She is currently taking buspirone 20 mg PRN, lamotrigine 150 BID, and Vraylar 4.5 mg daily.  She states that her mood is still good and anxiety is still managed.  She continues to have AM nausea that had been controlled when she was taking amitriptyline.  Recent labs per PCP unrevealing.  Reports she has had AM nausea basically her whole life, but lately she has also had vomiting most days.    PSYCHIATRIC: Pertinant items are noted in the narrative.    Past Medical,  Family and Social History: The patient's past medical, family and social history have been reviewed and updated as appropriate within the electronic medical record - see encounter notes.    Compliance: yes    Side effects: see above    Risk Parameters:  Patient reports no suicidal ideation  Patient reports no homicidal ideation  Patient reports no self-injurious behavior  Patient reports no violent behavior    Exam (detailed: at least 9 elements; comprehensive: all 15 elements)   Constitutional  Vitals:  Most recent vital signs, dated less than 90 days prior to this appointment, were reviewed.   There were no vitals filed for this visit.     General:  unremarkable, age appropriate, casually dressed     Musculoskeletal  Muscle Strength/Tone:  tremor noted fine tremor of hands   Gait & Station:  not examined     Psychiatric  Speech:  no latency; no press   Mood & Affect:  steady  congruent and appropriate   Thought Process:  normal and logical   Associations:  intact   Thought Content:  normal, no suicidality, no homicidality, delusions, or paranoia   Insight:  intact   Judgement: behavior is adequate to circumstances   Orientation:  grossly intact   Memory: intact for content of interview   Language: grossly intact   Attention Span & Concentration:  able to focus   Fund of Knowledge:  intact and appropriate to age and level of education     Assessment and Diagnosis   Status/Progress: Based on the examination today, the patient's problem(s) is/are adequately but not ideally controlled.  New problems have not been presented today.   Diagnostic uncertainty and Lack of compliance are not complicating management of the primary condition.  There are no active rule-out diagnoses for this patient at this time.     General Impression: Bipolar II,AMANDA, and PTSD, now stable with Vraylar, lamotrigine, and buspirone.  Will add back a small dose of amitriptyline PM.  Pt had been wanting to reduce med count, so may be able to come  off of the buspirone when an effective dose of amitriptyline is reached.  Was previously on 75 mg nightly.  No diagnosis found.      1. Restarting amitriptyline at 10 mg nightly.  Risks/bebefits/side effects discussed.  2. Continue Vraylar 4.5 mg daily.  Risks/bebefits/side effects discussed.  3. Continue lamotrigine 150 mg BID, higher dose necessary due to interaction with OCP.  Risks/bebefits/side effects discussed.  Tolerating well so far, no rash.    4. Continue buspirone 15 mg BID.  5. Follow up 2 weeks    Intervention/Counseling/Treatment Plan   Medication Management: Continue current medications. The risks and benefits of medication were discussed with the patient.      Return to Clinic: 2 weeks    Visit today included increased complexity associated with the care of the episodic problem anxiety and depression addressed and managing the longitudinal care of the patient due to the serious and/or complex managed problem(s) bipolar II, AMANDA, PTSD.

## 2025-01-22 ENCOUNTER — OFFICE VISIT (OUTPATIENT)
Dept: PSYCHIATRY | Facility: CLINIC | Age: 25
End: 2025-01-22
Payer: COMMERCIAL

## 2025-01-22 DIAGNOSIS — F43.10 PTSD (POST-TRAUMATIC STRESS DISORDER): ICD-10-CM

## 2025-01-22 DIAGNOSIS — F41.1 GENERALIZED ANXIETY DISORDER WITH PANIC ATTACKS: ICD-10-CM

## 2025-01-22 DIAGNOSIS — F31.81 BIPOLAR 2 DISORDER: Primary | ICD-10-CM

## 2025-01-22 DIAGNOSIS — F41.0 GENERALIZED ANXIETY DISORDER WITH PANIC ATTACKS: ICD-10-CM

## 2025-01-22 RX ORDER — AMITRIPTYLINE HYDROCHLORIDE 25 MG/1
25 TABLET, FILM COATED ORAL NIGHTLY
Qty: 90 TABLET | Refills: 0 | Status: SHIPPED | OUTPATIENT
Start: 2025-01-22 | End: 2025-04-22

## 2025-01-22 NOTE — PROGRESS NOTES
Outpatient Psychiatry Follow-Up Visit (HUI)    1/22/2025    Clinical Status of Patient:  Outpatient (Ambulatory)    The patient location is:  At home in [place]  The chief complaint leading to consultation is: mood disorder    Visit type: audiovisual    Face to Face time with patient: 6 min  15 minutes of total time spent on the encounter, which includes face to face time and non-face to face time preparing to see the patient (eg, review of tests), Obtaining and/or reviewing separately obtained history, Documenting clinical information in the electronic or other health record, Independently interpreting results (not separately reported) and communicating results to the patient/family/caregiver, or Care coordination (not separately reported).     Each patient to whom he or she provides medical services by telemedicine is:  (1) informed of the relationship between the physician and patient and the respective role of any other health care provider with respect to management of the patient; and (2) notified that he or she may decline to receive medical services by telemedicine and may withdraw from such care at any time.        Chief Complaint:  Sunshine Lund is a 24 y.o. female who presents today for follow-up of mood disorder and anxiety.  Met with patient.      Interval History and Content of Current Session:  Interim Events/Subjective Report/Content of Current Session:   Patient returns for follow up of bipolar II and anxiety. She is currently taking lamotrigine 150 BID, Vraylar 4.5 mg daily, and amitriptyline 10 mg nightly, restarted at lower dose last visit.  She states that she is sleeping better but still waking up 2-3 times per night, getting back to sleep about 5 min later.  States that her AM nausea has resolved.  Stopped buspirone and feels maybe a bit more anxious.    PSYCHIATRIC: Pertinant items are noted in the narrative.    Past Medical, Family and Social History: The patient's past medical, family  and social history have been reviewed and updated as appropriate within the electronic medical record - see encounter notes.    Compliance: yes    Side effects: see above    Risk Parameters:  Patient reports no suicidal ideation  Patient reports no homicidal ideation  Patient reports no self-injurious behavior  Patient reports no violent behavior    Exam (detailed: at least 9 elements; comprehensive: all 15 elements)   Constitutional  Vitals:  Most recent vital signs, dated less than 90 days prior to this appointment, were reviewed.   There were no vitals filed for this visit.     General:  unremarkable, age appropriate, casually dressed     Musculoskeletal  Muscle Strength/Tone:  tremor noted fine tremor of hands   Gait & Station:  not examined     Psychiatric  Speech:  no latency; no press   Mood & Affect:  steady  congruent and appropriate   Thought Process:  normal and logical   Associations:  intact   Thought Content:  normal, no suicidality, no homicidality, delusions, or paranoia   Insight:  intact   Judgement: behavior is adequate to circumstances   Orientation:  grossly intact   Memory: intact for content of interview   Language: grossly intact   Attention Span & Concentration:  able to focus   Fund of Knowledge:  intact and appropriate to age and level of education     Assessment and Diagnosis   Status/Progress: Based on the examination today, the patient's problem(s) is/are adequately but not ideally controlled.  New problems have not been presented today.   Diagnostic uncertainty and Lack of compliance are not complicating management of the primary condition.  There are no active rule-out diagnoses for this patient at this time.     General Impression: Bipolar II,AMANDA, and PTSD, now stable with Vraylar, lamotrigine, nausea improved with amitriptyline (IBS).  Will try increasing to 25 mg nightly targeting sleep and anxiety.  Was previously stable on 75 mg nightly.      ICD-10-CM ICD-9-CM   1. Bipolar 2  disorder  F31.81 296.89   2. Generalized anxiety disorder with panic attacks  F41.1 300.02    F41.0 300.01   3. PTSD (post-traumatic stress disorder)  F43.10 309.81       1. Increase amitriptyline to 25 mg nightly.  Risks/bebefits/side effects discussed.  2. Continue Vraylar 4.5 mg daily.  Risks/bebefits/side effects discussed.  3. Continue lamotrigine 150 mg BID, higher dose necessary due to interaction with OCP.  Risks/bebefits/side effects discussed.  Tolerating well so far, no rash.    4. Follow up 4 weeks    Intervention/Counseling/Treatment Plan   Medication Management: Continue current medications. The risks and benefits of medication were discussed with the patient.      Return to Clinic: 2 weeks    Visit today included increased complexity associated with the care of the episodic problem anxiety and insomnia addressed and managing the longitudinal care of the patient due to the serious and/or complex managed problem(s) bipolar II, AMANDA, PTSD.

## 2025-02-03 RX ORDER — LAMOTRIGINE 150 MG/1
150 TABLET ORAL 2 TIMES DAILY
Qty: 180 TABLET | Refills: 1 | Status: SHIPPED | OUTPATIENT
Start: 2025-02-03 | End: 2025-08-02

## 2025-02-21 ENCOUNTER — OFFICE VISIT (OUTPATIENT)
Dept: PSYCHIATRY | Facility: CLINIC | Age: 25
End: 2025-02-21
Payer: COMMERCIAL

## 2025-02-21 DIAGNOSIS — F41.0 GENERALIZED ANXIETY DISORDER WITH PANIC ATTACKS: ICD-10-CM

## 2025-02-21 DIAGNOSIS — F41.1 GENERALIZED ANXIETY DISORDER WITH PANIC ATTACKS: ICD-10-CM

## 2025-02-21 DIAGNOSIS — F43.10 PTSD (POST-TRAUMATIC STRESS DISORDER): ICD-10-CM

## 2025-02-21 DIAGNOSIS — F31.81 BIPOLAR 2 DISORDER: Primary | ICD-10-CM

## 2025-02-21 RX ORDER — AMITRIPTYLINE HYDROCHLORIDE 50 MG/1
50 TABLET, FILM COATED ORAL NIGHTLY
Qty: 90 TABLET | Refills: 1 | Status: SHIPPED | OUTPATIENT
Start: 2025-02-21 | End: 2025-08-20

## 2025-02-21 NOTE — PROGRESS NOTES
Outpatient Psychiatry Follow-Up Visit (HUI)    2/21/2025    Clinical Status of Patient:  Outpatient (Ambulatory)    The patient location is:  At home in [place]  The chief complaint leading to consultation is: mood disorder    Visit type: audiovisual    Face to Face time with patient: 8 min  15 minutes of total time spent on the encounter, which includes face to face time and non-face to face time preparing to see the patient (eg, review of tests), Obtaining and/or reviewing separately obtained history, Documenting clinical information in the electronic or other health record, Independently interpreting results (not separately reported) and communicating results to the patient/family/caregiver, or Care coordination (not separately reported).     Each patient to whom he or she provides medical services by telemedicine is:  (1) informed of the relationship between the physician and patient and the respective role of any other health care provider with respect to management of the patient; and (2) notified that he or she may decline to receive medical services by telemedicine and may withdraw from such care at any time.        Chief Complaint:  Sunshine Lund is a 24 y.o. female who presents today for follow-up of mood disorder and anxiety.  Met with patient.      Interval History and Content of Current Session:  Interim Events/Subjective Report/Content of Current Session:   Patient returns for follow up of bipolar II and anxiety. She is currently taking lamotrigine 150 BID, Vraylar 4.5 mg daily, and amitriptyline 25 mg nightly, increased last visit targeting sleep maintenance.  She states that her nighttime awakenings have gone from 2-3 per night to 1-2 per night, which is an improvement but not ideal.  Denies side effects.  Was on 75 mg previously but decreased and stopped due to attempting to decrease total med count.  She also states that she has been having random episodes of derealization that are not causing  limitations in functioning but are uncomfortable.  Denies any known trigger or more increased frequency in any particular situation or environment.  Feels like the timeline is similar to most recent increase of Vraylar, but isn't sure.  Also still having some tremor from the higher dose of Vraylar.      PSYCHIATRIC: Pertinant items are noted in the narrative.    Past Medical, Family and Social History: The patient's past medical, family and social history have been reviewed and updated as appropriate within the electronic medical record - see encounter notes.    Compliance: yes    Side effects: see above    Risk Parameters:  Patient reports no suicidal ideation  Patient reports no homicidal ideation  Patient reports no self-injurious behavior  Patient reports no violent behavior    Exam (detailed: at least 9 elements; comprehensive: all 15 elements)   Constitutional  Vitals:  Most recent vital signs, dated less than 90 days prior to this appointment, were reviewed.   There were no vitals filed for this visit.     General:  unremarkable, age appropriate, casually dressed     Musculoskeletal  Muscle Strength/Tone:  tremor noted fine tremor of hands   Gait & Station:  not examined     Psychiatric  Speech:  no latency; no press   Mood & Affect:  steady  congruent and appropriate   Thought Process:  normal and logical   Associations:  intact   Thought Content:  normal, no suicidality, no homicidality, delusions, or paranoia   Insight:  intact   Judgement: behavior is adequate to circumstances   Orientation:  grossly intact   Memory: intact for content of interview   Language: grossly intact   Attention Span & Concentration:  able to focus   Fund of Knowledge:  intact and appropriate to age and level of education     Assessment and Diagnosis   Status/Progress: Based on the examination today, the patient's problem(s) is/are adequately but not ideally controlled.  New problems have not been presented today.   Diagnostic  uncertainty and Lack of compliance are not complicating management of the primary condition.  There are no active rule-out diagnoses for this patient at this time.     General Impression: Bipolar II,AMANDA, and PTSD, now stable with Vraylar, lamotrigine, nausea improved with amitriptyline (IBS).  Will try increasing to 50 mg targeting sleep.  Previously tolerated 75 mg nightly.  Will hold with the Vraylar 4.5 mg daily for now and possibly talk about switching to Caplyta if she feels the derealization and tremor are related to the Vraylar and significant enough to warrant a major med change.      ICD-10-CM ICD-9-CM   1. Bipolar 2 disorder  F31.81 296.89   2. Generalized anxiety disorder with panic attacks  F41.1 300.02    F41.0 300.01   3. PTSD (post-traumatic stress disorder)  F43.10 309.81       1. Increase amitriptyline to 50 mg nightly.  Risks/bebefits/side effects discussed.  2. Continue Vraylar 4.5 mg daily.  Risks/bebefits/side effects discussed.  3. Continue lamotrigine 150 mg BID, higher dose necessary due to interaction with OCP.  Risks/bebefits/side effects discussed.  Tolerating well so far, no rash.    4. Follow up 8 weeks    Intervention/Counseling/Treatment Plan   Medication Management: Continue current medications. The risks and benefits of medication were discussed with the patient.      Return to Clinic: 2 months    Visit today included increased complexity associated with the care of the episodic problem anxiety and insomnia addressed and managing the longitudinal care of the patient due to the serious and/or complex managed problem(s) bipolar II, AMANDA, PTSD.

## 2025-04-21 ENCOUNTER — OFFICE VISIT (OUTPATIENT)
Dept: PSYCHIATRY | Facility: CLINIC | Age: 25
End: 2025-04-21
Payer: COMMERCIAL

## 2025-04-21 DIAGNOSIS — F41.1 GENERALIZED ANXIETY DISORDER WITH PANIC ATTACKS: ICD-10-CM

## 2025-04-21 DIAGNOSIS — F41.0 GENERALIZED ANXIETY DISORDER WITH PANIC ATTACKS: ICD-10-CM

## 2025-04-21 DIAGNOSIS — F43.10 PTSD (POST-TRAUMATIC STRESS DISORDER): ICD-10-CM

## 2025-04-21 DIAGNOSIS — F31.81 BIPOLAR 2 DISORDER: Primary | ICD-10-CM

## 2025-04-21 PROCEDURE — 1159F MED LIST DOCD IN RCRD: CPT | Mod: CPTII,95,, | Performed by: PHYSICIAN ASSISTANT

## 2025-04-21 PROCEDURE — 98006 SYNCH AUDIO-VIDEO EST MOD 30: CPT | Mod: 95,,, | Performed by: PHYSICIAN ASSISTANT

## 2025-04-21 PROCEDURE — G2211 COMPLEX E/M VISIT ADD ON: HCPCS | Mod: 95,,, | Performed by: PHYSICIAN ASSISTANT

## 2025-04-21 PROCEDURE — 1160F RVW MEDS BY RX/DR IN RCRD: CPT | Mod: CPTII,95,, | Performed by: PHYSICIAN ASSISTANT

## 2025-04-21 RX ORDER — AMITRIPTYLINE HYDROCHLORIDE 75 MG/1
75 TABLET ORAL NIGHTLY
Qty: 90 TABLET | Refills: 1 | Status: SHIPPED | OUTPATIENT
Start: 2025-04-21 | End: 2025-10-18

## 2025-04-21 NOTE — PROGRESS NOTES
Outpatient Psychiatry Follow-Up Visit (HUI)    4/21/2025    Clinical Status of Patient:  Outpatient (Ambulatory)    The patient location is:  In her car in [place]  The chief complaint leading to consultation is: mood disorder    Visit type: audiovisual    Face to Face time with patient: 8 min  15 minutes of total time spent on the encounter, which includes face to face time and non-face to face time preparing to see the patient (eg, review of tests), Obtaining and/or reviewing separately obtained history, Documenting clinical information in the electronic or other health record, Independently interpreting results (not separately reported) and communicating results to the patient/family/caregiver, or Care coordination (not separately reported).     Each patient to whom he or she provides medical services by telemedicine is:  (1) informed of the relationship between the physician and patient and the respective role of any other health care provider with respect to management of the patient; and (2) notified that he or she may decline to receive medical services by telemedicine and may withdraw from such care at any time.        Chief Complaint:  Sunshine Lund is a 24 y.o. female who presents today for follow-up of mood disorder and anxiety.  Met with patient.      Interval History and Content of Current Session:  Interim Events/Subjective Report/Content of Current Session:   Patient returns for follow up of bipolar II and anxiety. She is currently taking lamotrigine 150 BID, Vraylar 4.5 mg daily, and amitriptyline 50 mg nightly, increased last visit targeting sleep maintenance.  She states that she is now sleeping fairly well, with some sleep latency some of the time.  States she still feels somewhat depressed and somewhat anxious several days per week.  Denies feeling the dissociative feeling she previously described, which were likely related to lack of restorative sleep.    PSYCHIATRIC: Pertinant items are  noted in the narrative.    Past Medical, Family and Social History: The patient's past medical, family and social history have been reviewed and updated as appropriate within the electronic medical record - see encounter notes.    Compliance: yes    Side effects: see above    Risk Parameters:  Patient reports no suicidal ideation  Patient reports no homicidal ideation  Patient reports no self-injurious behavior  Patient reports no violent behavior    Exam (detailed: at least 9 elements; comprehensive: all 15 elements)   Constitutional  Vitals:  Most recent vital signs, dated less than 90 days prior to this appointment, were reviewed.   There were no vitals filed for this visit.     General:  unremarkable, age appropriate, casually dressed     Musculoskeletal  Muscle Strength/Tone:  tremor noted fine tremor of hands   Gait & Station:  not examined     Psychiatric  Speech:  no latency; no press   Mood & Affect:  steady  congruent and appropriate   Thought Process:  normal and logical   Associations:  intact   Thought Content:  normal, no suicidality, no homicidality, delusions, or paranoia   Insight:  intact   Judgement: behavior is adequate to circumstances   Orientation:  grossly intact   Memory: intact for content of interview   Language: grossly intact   Attention Span & Concentration:  able to focus   Fund of Knowledge:  intact and appropriate to age and level of education     Assessment and Diagnosis   Status/Progress: Based on the examination today, the patient's problem(s) is/are adequately but not ideally controlled.  New problems have not been presented today.   Diagnostic uncertainty and Lack of compliance are not complicating management of the primary condition.  There are no active rule-out diagnoses for this patient at this time.     General Impression: Bipolar II,AMANDA, and PTSD, still suboptimal treatment of depression and anxiety.  Will increase amitriptyline back to previous dose ot 75 mg nightly, and  pt can even increase to 100 mg nightly after 2 weeks if desired.  If this doesn't help the anxiety and depression, we will again consider switching Vraylar to Caplyta.  Pt has a hx of borderline traits, although she is in a stable long term relationship with her boyfriend and best friend.        ICD-10-CM ICD-9-CM   1. Bipolar 2 disorder  F31.81 296.89   2. Generalized anxiety disorder with panic attacks  F41.1 300.02    F41.0 300.01   3. PTSD (post-traumatic stress disorder)  F43.10 309.81     1. Increase amitriptyline to 75 or after a couple of weeks 100 mg nightly.  Risks/bebefits/side effects discussed.  2. Continue Vraylar 4.5 mg daily.  Risks/bebefits/side effects discussed.  3. Continue lamotrigine 150 mg BID, higher dose necessary due to interaction with OCP.  Risks/bebefits/side effects discussed.  Tolerating well so far, no rash.    4. Follow up 4 weeks    Intervention/Counseling/Treatment Plan   Medication Management: Continue current medications. The risks and benefits of medication were discussed with the patient.      Return to Clinic: 1 month    Visit today included increased complexity associated with the care of the episodic problem anxiety and insomnia addressed and managing the longitudinal care of the patient due to the serious and/or complex managed problem(s) bipolar II, AMANDA, PTSD.

## 2025-05-22 ENCOUNTER — OFFICE VISIT (OUTPATIENT)
Dept: PSYCHIATRY | Facility: CLINIC | Age: 25
End: 2025-05-22
Payer: COMMERCIAL

## 2025-05-22 DIAGNOSIS — T74.02XS: ICD-10-CM

## 2025-05-22 DIAGNOSIS — F41.1 GENERALIZED ANXIETY DISORDER WITH PANIC ATTACKS: ICD-10-CM

## 2025-05-22 DIAGNOSIS — F41.0 GENERALIZED ANXIETY DISORDER WITH PANIC ATTACKS: ICD-10-CM

## 2025-05-22 DIAGNOSIS — F31.81 BIPOLAR 2 DISORDER: Primary | ICD-10-CM

## 2025-05-22 DIAGNOSIS — Z73.1 ACCENTUATION OF PERSONALITY TRAITS: ICD-10-CM

## 2025-05-22 DIAGNOSIS — F43.10 PTSD (POST-TRAUMATIC STRESS DISORDER): ICD-10-CM

## 2025-05-22 RX ORDER — AMITRIPTYLINE HYDROCHLORIDE 100 MG/1
100 TABLET ORAL NIGHTLY
Qty: 90 TABLET | Refills: 1 | Status: SHIPPED | OUTPATIENT
Start: 2025-05-22 | End: 2025-11-18
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
## 2025-06-05 ENCOUNTER — OFFICE VISIT (OUTPATIENT)
Dept: OBSTETRICS AND GYNECOLOGY | Facility: CLINIC | Age: 25
End: 2025-06-05
Payer: COMMERCIAL

## 2025-06-05 VITALS — WEIGHT: 132.5 LBS | DIASTOLIC BLOOD PRESSURE: 70 MMHG | BODY MASS INDEX: 19.57 KG/M2 | SYSTOLIC BLOOD PRESSURE: 110 MMHG

## 2025-06-05 DIAGNOSIS — Z01.419 ENCOUNTER FOR WELL WOMAN EXAM WITH ROUTINE GYNECOLOGICAL EXAM: Primary | ICD-10-CM

## 2025-06-05 DIAGNOSIS — N94.6 DYSMENORRHEA: ICD-10-CM

## 2025-06-05 DIAGNOSIS — Z12.4 SCREENING FOR CERVICAL CANCER: ICD-10-CM

## 2025-06-05 PROCEDURE — 99999 PR PBB SHADOW E&M-EST. PATIENT-LVL III: CPT | Mod: PBBFAC,,,

## 2025-06-16 ENCOUNTER — RESULTS FOLLOW-UP (OUTPATIENT)
Dept: OBSTETRICS AND GYNECOLOGY | Facility: CLINIC | Age: 25
End: 2025-06-16

## 2025-06-16 RX ORDER — METRONIDAZOLE 500 MG/1
500 TABLET ORAL 2 TIMES DAILY
Qty: 14 TABLET | Refills: 0 | Status: SHIPPED | OUTPATIENT
Start: 2025-06-16 | End: 2025-06-23

## 2025-06-16 RX ORDER — FLUCONAZOLE 150 MG/1
150 TABLET ORAL ONCE
Qty: 2 TABLET | Refills: 0 | Status: SHIPPED | OUTPATIENT
Start: 2025-06-16 | End: 2025-06-16

## 2025-07-23 ENCOUNTER — OFFICE VISIT (OUTPATIENT)
Dept: PSYCHIATRY | Facility: CLINIC | Age: 25
End: 2025-07-23
Payer: COMMERCIAL

## 2025-07-23 DIAGNOSIS — F41.1 GENERALIZED ANXIETY DISORDER WITH PANIC ATTACKS: ICD-10-CM

## 2025-07-23 DIAGNOSIS — F41.0 GENERALIZED ANXIETY DISORDER WITH PANIC ATTACKS: ICD-10-CM

## 2025-07-23 DIAGNOSIS — F31.81 BIPOLAR 2 DISORDER: Primary | ICD-10-CM

## 2025-07-23 DIAGNOSIS — F43.10 PTSD (POST-TRAUMATIC STRESS DISORDER): ICD-10-CM

## 2025-07-23 PROCEDURE — 1159F MED LIST DOCD IN RCRD: CPT | Mod: CPTII,95,, | Performed by: PHYSICIAN ASSISTANT

## 2025-07-23 PROCEDURE — 98006 SYNCH AUDIO-VIDEO EST MOD 30: CPT | Mod: 95,,, | Performed by: PHYSICIAN ASSISTANT

## 2025-07-23 PROCEDURE — 1160F RVW MEDS BY RX/DR IN RCRD: CPT | Mod: CPTII,95,, | Performed by: PHYSICIAN ASSISTANT

## 2025-07-23 PROCEDURE — G2211 COMPLEX E/M VISIT ADD ON: HCPCS | Mod: 95,,, | Performed by: PHYSICIAN ASSISTANT

## 2025-07-23 RX ORDER — LAMOTRIGINE 150 MG/1
150 TABLET ORAL 2 TIMES DAILY
Qty: 180 TABLET | Refills: 1 | Status: SHIPPED | OUTPATIENT
Start: 2025-07-23 | End: 2026-01-19

## 2025-07-23 NOTE — PROGRESS NOTES
"The patient location is: In her car in [place]  The chief complaint leading to consultation is: F/u    Visit type: audiovisual    Face to Face time with patient: 6 min  10 minutes of total time spent on the encounter, which includes face to face time and non-face to face time preparing to see the patient (eg, review of tests), Obtaining and/or reviewing separately obtained history, Documenting clinical information in the electronic or other health record, Independently interpreting results (not separately reported) and communicating results to the patient/family/caregiver, or Care coordination (not separately reported).         Each patient to whom he or she provides medical services by telemedicine is:  (1) informed of the relationship between the physician and patient and the respective role of any other health care provider with respect to management of the patient; and (2) notified that he or she may decline to receive medical services by telemedicine and may withdraw from such care at any time.    Notes:     ICD-10-CM ICD-9-CM    1. Bipolar 2 disorder  F31.81 296.89       2. Generalized anxiety disorder with panic attacks  F41.1 300.02     F41.0 300.01       3. PTSD (post-traumatic stress disorder)  F43.10 309.81         Patient ID: Sunshine Lund is a 25 y.o. female.    Chief Complaint: No chief complaint on file.    History of Present Illness    HPI:  Ms. Lund reports her mood has been "alright" with no significant issues, except for a "bad day" a few weeks ago attributed to her menstrual cycle. Her anxiety has been "good" without any notable concerns. Sleep has improved, with less frequent night awakenings and the ability to sleep in on weekends until 9:00 AM. Ms. Lund notes an increase in appetite since the last visit, reporting more snacking and some weight gain. At her last doctor's visit, she weighed 135 lbs. Ms. Lund reports good compliance with her medication regimen. She expresses " nervousness about starting birth control pills in addition to her Kyleena IUD, citing concerns about potential side effects and their impact on her stability. She has not yet started taking the pills. The provider notes this is the most stable the patient has been since they started seeing her. Ms. Lund reports being actively engaged in therapy. She denies any significant mood disturbances or anxiety symptoms.    LIFESTYLE:  Ms. Lund uses Kyleena, an intrauterine device, for birth control. She is considering starting birth control pills in addition to Kyleena but expresses nervousness about potential side effects.      ROS:  General: +weight gain, +increased appetite, +appetite changes    All other review of systems negative unless otherwise noted in the HPI.         Physical Exam    Appearance: Appears stated age. Well-groomed. Well-nourished.  Speech: Normal rate. Normal volume. Spontaneous and fluid.  Affect: Appropriate.  Thought Content: No evidence of aggression. No evidence of homicidal ideation. No evidence of homicidal plan. No evidence of homicidal intent. No evidence of suicidal ideation. No evidence of suicidal plan. No evidence of suicidal intent. No evidence of delusions.  Memory: Recent memory intact. Remote memory intact.  Behavior: Cooperative. Good eye contact. Engaged. Pleasant.  Mood: Euthymic.  Thought Form: Linear thinking. Goal oriented and directed.  Perception: No perceptual abnormalities noted.  Judgement: Intact as evidenced by decision making in the recent past.  Insight: Good insight into symptoms. Good insight into treatment options.  Cognition: A&Ox3. Normal attention span. Average fund of knowledge.  Motor: No gross motor abnormalities.    Examination findings limited by telemedicine platform.         Assessment & Plan    IMPRESSION:  Patient reports stable mood and improved anxiety symptoms.  Sleep quality improved, with ability to sleep in on weekends.  Increased appetite  noted, with associated weight gain.  Current medication regimen (amitriptyline 100 mg nightly, Vraylar 4.5 mg daily, lamotrigine 150 mg BID) appears effective in maintaining stability.  Considered hesitation about starting birth control pills in addition to Kyleena IUD.      FOLLOW-UP:  1. Follow up on October 23rd at 11:30 AM, approximately 3 months from current visit.         This note was generated with the assistance of ambient listening technology. Verbal consent was obtained by the patient and accompanying visitor(s) for the recording of patient appointment to facilitate this note. I attest to having reviewed and edited the generated note for accuracy, though some syntax or spelling errors may persist. Please contact the author of this note for any clarification.          ICD-10-CM ICD-9-CM    1. Bipolar 2 disorder  F31.81 296.89       2. Generalized anxiety disorder with panic attacks  F41.1 300.02     F41.0 300.01       3. PTSD (post-traumatic stress disorder)  F43.10 309.81              Visit today included increased complexity associated with the care of the episodic problem med management addressed and managing the longitudinal care of the patient due to the serious and/or complex managed problem(s) diagnoses as listed above.

## 2025-08-07 RX ORDER — CARIPRAZINE 4.5 MG/1
CAPSULE, GELATIN COATED ORAL
Qty: 90 CAPSULE | Refills: 1 | Status: SHIPPED | OUTPATIENT
Start: 2025-08-07

## 2025-08-15 ENCOUNTER — PATIENT MESSAGE (OUTPATIENT)
Dept: PSYCHIATRY | Facility: CLINIC | Age: 25
End: 2025-08-15
Payer: COMMERCIAL